# Patient Record
Sex: FEMALE | Race: WHITE | NOT HISPANIC OR LATINO | Employment: OTHER | URBAN - METROPOLITAN AREA
[De-identification: names, ages, dates, MRNs, and addresses within clinical notes are randomized per-mention and may not be internally consistent; named-entity substitution may affect disease eponyms.]

---

## 2017-01-01 ENCOUNTER — HOSPITAL ENCOUNTER (OUTPATIENT)
Dept: RADIOLOGY | Facility: MEDICAL CENTER | Age: 62
End: 2017-11-09
Attending: FAMILY MEDICINE
Payer: MEDICAID

## 2017-01-01 DIAGNOSIS — R10.11 ABDOMINAL PAIN, RIGHT UPPER QUADRANT: ICD-10-CM

## 2017-01-01 PROCEDURE — 76705 ECHO EXAM OF ABDOMEN: CPT

## 2017-01-24 ENCOUNTER — HOSPITAL ENCOUNTER (OUTPATIENT)
Dept: RADIOLOGY | Facility: MEDICAL CENTER | Age: 62
End: 2017-01-24
Attending: FAMILY MEDICINE
Payer: MEDICAID

## 2017-01-24 DIAGNOSIS — E27.8 ADRENAL MASS (HCC): ICD-10-CM

## 2017-01-24 PROCEDURE — 74150 CT ABDOMEN W/O CONTRAST: CPT

## 2018-01-01 ENCOUNTER — APPOINTMENT (OUTPATIENT)
Dept: RADIOLOGY | Facility: MEDICAL CENTER | Age: 63
DRG: 193 | End: 2018-01-01
Attending: INTERNAL MEDICINE
Payer: MEDICAID

## 2018-01-01 ENCOUNTER — HOSPITAL ENCOUNTER (INPATIENT)
Facility: MEDICAL CENTER | Age: 63
LOS: 19 days | DRG: 193 | End: 2018-07-12
Attending: EMERGENCY MEDICINE | Admitting: HOSPITALIST
Payer: MEDICAID

## 2018-01-01 ENCOUNTER — HOSPITAL ENCOUNTER (INPATIENT)
Facility: MEDICAL CENTER | Age: 63
LOS: 5 days | DRG: 871 | End: 2018-07-19
Attending: EMERGENCY MEDICINE | Admitting: HOSPITALIST
Payer: MEDICAID

## 2018-01-01 ENCOUNTER — HOME CARE VISIT (OUTPATIENT)
Dept: HOSPICE | Facility: HOSPICE | Age: 63
End: 2018-01-01
Payer: MEDICAID

## 2018-01-01 ENCOUNTER — APPOINTMENT (OUTPATIENT)
Dept: RADIOLOGY | Facility: MEDICAL CENTER | Age: 63
DRG: 193 | End: 2018-01-01
Attending: HOSPITALIST
Payer: MEDICAID

## 2018-01-01 ENCOUNTER — APPOINTMENT (OUTPATIENT)
Dept: RADIOLOGY | Facility: MEDICAL CENTER | Age: 63
DRG: 435 | End: 2018-01-01
Attending: INTERNAL MEDICINE
Payer: MEDICAID

## 2018-01-01 ENCOUNTER — PATIENT OUTREACH (OUTPATIENT)
Dept: OTHER | Facility: MEDICAL CENTER | Age: 63
End: 2018-01-01

## 2018-01-01 ENCOUNTER — HOSPITAL ENCOUNTER (INPATIENT)
Facility: MEDICAL CENTER | Age: 63
LOS: 10 days | DRG: 435 | End: 2018-05-14
Attending: FAMILY MEDICINE | Admitting: FAMILY MEDICINE
Payer: MEDICAID

## 2018-01-01 ENCOUNTER — APPOINTMENT (OUTPATIENT)
Dept: HOSPICE | Facility: HOSPICE | Age: 63
End: 2018-01-01
Payer: MEDICAID

## 2018-01-01 ENCOUNTER — APPOINTMENT (OUTPATIENT)
Dept: RADIOLOGY | Facility: MEDICAL CENTER | Age: 63
DRG: 437 | End: 2018-01-01
Attending: INTERNAL MEDICINE
Payer: MEDICAID

## 2018-01-01 ENCOUNTER — PATIENT OUTREACH (OUTPATIENT)
Dept: HEALTH INFORMATION MANAGEMENT | Facility: OTHER | Age: 63
End: 2018-01-01

## 2018-01-01 ENCOUNTER — APPOINTMENT (OUTPATIENT)
Dept: RADIOLOGY | Facility: MEDICAL CENTER | Age: 63
DRG: 871 | End: 2018-01-01
Attending: EMERGENCY MEDICINE
Payer: MEDICAID

## 2018-01-01 ENCOUNTER — HOSPITAL ENCOUNTER (OUTPATIENT)
Dept: RADIOLOGY | Facility: MEDICAL CENTER | Age: 63
End: 2018-05-21
Attending: INTERNAL MEDICINE
Payer: MEDICAID

## 2018-01-01 ENCOUNTER — APPOINTMENT (OUTPATIENT)
Dept: RADIOLOGY | Facility: MEDICAL CENTER | Age: 63
DRG: 193 | End: 2018-01-01
Attending: EMERGENCY MEDICINE
Payer: MEDICAID

## 2018-01-01 ENCOUNTER — HOSPITAL ENCOUNTER (EMERGENCY)
Facility: MEDICAL CENTER | Age: 63
End: 2018-05-04
Attending: EMERGENCY MEDICINE
Payer: MEDICAID

## 2018-01-01 ENCOUNTER — RESOLUTE PROFESSIONAL BILLING HOSPITAL PROF FEE (OUTPATIENT)
Dept: HOSPITALIST | Facility: MEDICAL CENTER | Age: 63
End: 2018-01-01
Payer: MEDICAID

## 2018-01-01 ENCOUNTER — APPOINTMENT (OUTPATIENT)
Dept: RADIOLOGY | Facility: MEDICAL CENTER | Age: 63
DRG: 871 | End: 2018-01-01
Payer: MEDICAID

## 2018-01-01 ENCOUNTER — APPOINTMENT (OUTPATIENT)
Dept: RADIOLOGY | Facility: MEDICAL CENTER | Age: 63
DRG: 193 | End: 2018-01-01
Attending: RADIOLOGY
Payer: MEDICAID

## 2018-01-01 ENCOUNTER — HOSPICE ADMISSION (OUTPATIENT)
Dept: HOSPICE | Facility: HOSPICE | Age: 63
End: 2018-01-01
Payer: MEDICAID

## 2018-01-01 ENCOUNTER — APPOINTMENT (OUTPATIENT)
Dept: RADIOLOGY | Facility: MEDICAL CENTER | Age: 63
End: 2018-01-01
Attending: EMERGENCY MEDICINE
Payer: MEDICAID

## 2018-01-01 ENCOUNTER — HOSPITAL ENCOUNTER (OUTPATIENT)
Dept: RADIOLOGY | Facility: MEDICAL CENTER | Age: 63
End: 2018-06-04
Attending: INTERNAL MEDICINE
Payer: MEDICAID

## 2018-01-01 ENCOUNTER — HOSPITAL ENCOUNTER (INPATIENT)
Facility: MEDICAL CENTER | Age: 63
LOS: 5 days | DRG: 437 | End: 2018-04-18
Attending: INTERNAL MEDICINE | Admitting: INTERNAL MEDICINE
Payer: MEDICAID

## 2018-01-01 ENCOUNTER — HOME HEALTH ADMISSION (OUTPATIENT)
Dept: HOME HEALTH SERVICES | Facility: HOME HEALTHCARE | Age: 63
End: 2018-01-01
Payer: MEDICAID

## 2018-01-01 ENCOUNTER — HOSPITAL ENCOUNTER (OUTPATIENT)
Facility: MEDICAL CENTER | Age: 63
DRG: 435 | End: 2018-01-01
Admitting: FAMILY MEDICINE
Payer: MEDICAID

## 2018-01-01 ENCOUNTER — HOSPITAL ENCOUNTER (OUTPATIENT)
Facility: MEDICAL CENTER | Age: 63
End: 2018-04-13

## 2018-01-01 VITALS
BODY MASS INDEX: 21.28 KG/M2 | RESPIRATION RATE: 18 BRPM | HEIGHT: 68 IN | OXYGEN SATURATION: 93 % | WEIGHT: 140.43 LBS | TEMPERATURE: 100 F | SYSTOLIC BLOOD PRESSURE: 123 MMHG | DIASTOLIC BLOOD PRESSURE: 71 MMHG | HEART RATE: 92 BPM

## 2018-01-01 VITALS
RESPIRATION RATE: 18 BRPM | HEIGHT: 68 IN | DIASTOLIC BLOOD PRESSURE: 72 MMHG | TEMPERATURE: 98.4 F | BODY MASS INDEX: 21.05 KG/M2 | WEIGHT: 138.89 LBS | HEART RATE: 104 BPM | SYSTOLIC BLOOD PRESSURE: 121 MMHG | OXYGEN SATURATION: 93 %

## 2018-01-01 VITALS
HEART RATE: 125 BPM | RESPIRATION RATE: 22 BRPM | SYSTOLIC BLOOD PRESSURE: 100 MMHG | TEMPERATURE: 99.6 F | DIASTOLIC BLOOD PRESSURE: 58 MMHG | OXYGEN SATURATION: 90 %

## 2018-01-01 VITALS
DIASTOLIC BLOOD PRESSURE: 66 MMHG | BODY MASS INDEX: 22.37 KG/M2 | RESPIRATION RATE: 16 BRPM | WEIGHT: 151.01 LBS | SYSTOLIC BLOOD PRESSURE: 102 MMHG | HEART RATE: 97 BPM | HEIGHT: 69 IN | OXYGEN SATURATION: 91 % | TEMPERATURE: 98 F

## 2018-01-01 VITALS
WEIGHT: 135.58 LBS | HEART RATE: 91 BPM | DIASTOLIC BLOOD PRESSURE: 77 MMHG | HEIGHT: 68 IN | SYSTOLIC BLOOD PRESSURE: 127 MMHG | TEMPERATURE: 99.7 F | RESPIRATION RATE: 18 BRPM | BODY MASS INDEX: 20.55 KG/M2 | OXYGEN SATURATION: 97 %

## 2018-01-01 VITALS
SYSTOLIC BLOOD PRESSURE: 88 MMHG | HEART RATE: 100 BPM | RESPIRATION RATE: 16 BRPM | DIASTOLIC BLOOD PRESSURE: 60 MMHG | OXYGEN SATURATION: 98 %

## 2018-01-01 VITALS
DIASTOLIC BLOOD PRESSURE: 58 MMHG | HEART RATE: 125 BPM | RESPIRATION RATE: 22 BRPM | SYSTOLIC BLOOD PRESSURE: 100 MMHG | TEMPERATURE: 99.6 F | OXYGEN SATURATION: 90 %

## 2018-01-01 VITALS
OXYGEN SATURATION: 93 % | DIASTOLIC BLOOD PRESSURE: 80 MMHG | HEART RATE: 104 BPM | SYSTOLIC BLOOD PRESSURE: 100 MMHG | RESPIRATION RATE: 10 BRPM

## 2018-01-01 VITALS
HEIGHT: 69 IN | DIASTOLIC BLOOD PRESSURE: 73 MMHG | WEIGHT: 151 LBS | HEART RATE: 104 BPM | OXYGEN SATURATION: 90 % | BODY MASS INDEX: 22.36 KG/M2 | SYSTOLIC BLOOD PRESSURE: 116 MMHG | TEMPERATURE: 97.2 F | RESPIRATION RATE: 17 BRPM

## 2018-01-01 VITALS — RESPIRATION RATE: 10 BRPM | OXYGEN SATURATION: 98 % | HEART RATE: 106 BPM

## 2018-01-01 DIAGNOSIS — R09.02 HYPOXIA: ICD-10-CM

## 2018-01-01 DIAGNOSIS — R10.9 INTRACTABLE ABDOMINAL PAIN: ICD-10-CM

## 2018-01-01 DIAGNOSIS — R10.11 RIGHT UPPER QUADRANT ABDOMINAL PAIN: ICD-10-CM

## 2018-01-01 DIAGNOSIS — C78.7 CHOLANGIOCARCINOMA METASTATIC TO LIVER (HCC): ICD-10-CM

## 2018-01-01 DIAGNOSIS — C78.7 LIVER METASTASES: ICD-10-CM

## 2018-01-01 DIAGNOSIS — C80.1 ADENOCARCINOMA (HCC): ICD-10-CM

## 2018-01-01 DIAGNOSIS — R11.2 INTRACTABLE VOMITING WITH NAUSEA, UNSPECIFIED VOMITING TYPE: ICD-10-CM

## 2018-01-01 DIAGNOSIS — C22.1 CHOLANGIOCARCINOMA METASTATIC TO LIVER (HCC): ICD-10-CM

## 2018-01-01 DIAGNOSIS — C22.1 CHOLANGIOCARCINOMA (HCC): ICD-10-CM

## 2018-01-01 DIAGNOSIS — M25.552 LEFT HIP PAIN: ICD-10-CM

## 2018-01-01 DIAGNOSIS — F32.A DEPRESSION, UNSPECIFIED DEPRESSION TYPE: ICD-10-CM

## 2018-01-01 DIAGNOSIS — R60.0 EDEMA OF BOTH LEGS: ICD-10-CM

## 2018-01-01 DIAGNOSIS — Z72.0 TOBACCO ABUSE: ICD-10-CM

## 2018-01-01 DIAGNOSIS — R41.82 ALTERED MENTAL STATUS, UNSPECIFIED ALTERED MENTAL STATUS TYPE: ICD-10-CM

## 2018-01-01 DIAGNOSIS — C78.7 LIVER METASTASES: Primary | ICD-10-CM

## 2018-01-01 DIAGNOSIS — D72.829 LEUKOCYTOSIS, UNSPECIFIED TYPE: ICD-10-CM

## 2018-01-01 DIAGNOSIS — C56.9 MALIGNANT NEOPLASM OF OVARY, UNSPECIFIED LATERALITY (HCC): ICD-10-CM

## 2018-01-01 DIAGNOSIS — R53.1 WEAKNESS: ICD-10-CM

## 2018-01-01 DIAGNOSIS — G62.9 NEUROPATHY: ICD-10-CM

## 2018-01-01 DIAGNOSIS — N39.0 URINARY TRACT INFECTION WITHOUT HEMATURIA, SITE UNSPECIFIED: ICD-10-CM

## 2018-01-01 DIAGNOSIS — R41.0 CONFUSION: ICD-10-CM

## 2018-01-01 DIAGNOSIS — M54.16 CHRONIC LUMBAR RADICULOPATHY: ICD-10-CM

## 2018-01-01 DIAGNOSIS — C78.7 LIVER METASTASIS: ICD-10-CM
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
## 2018-01-01 LAB
AFP-TM SERPL-MCNC: 4119 NG/ML (ref 0–9)
ALBUMIN SERPL BCP-MCNC: 2.6 G/DL (ref 3.2–4.9)
ALBUMIN SERPL BCP-MCNC: 2.6 G/DL (ref 3.2–4.9)
ALBUMIN SERPL BCP-MCNC: 2.7 G/DL (ref 3.2–4.9)
ALBUMIN SERPL BCP-MCNC: 2.8 G/DL (ref 3.2–4.9)
ALBUMIN SERPL BCP-MCNC: 2.8 G/DL (ref 3.2–4.9)
ALBUMIN SERPL BCP-MCNC: 3 G/DL (ref 3.2–4.9)
ALBUMIN SERPL BCP-MCNC: 3.1 G/DL (ref 3.2–4.9)
ALBUMIN SERPL BCP-MCNC: 3.1 G/DL (ref 3.2–4.9)
ALBUMIN SERPL BCP-MCNC: 3.2 G/DL (ref 3.2–4.9)
ALBUMIN SERPL BCP-MCNC: 3.2 G/DL (ref 3.2–4.9)
ALBUMIN SERPL BCP-MCNC: 3.3 G/DL (ref 3.2–4.9)
ALBUMIN SERPL BCP-MCNC: 3.4 G/DL (ref 3.2–4.9)
ALBUMIN SERPL BCP-MCNC: 3.5 G/DL (ref 3.2–4.9)
ALBUMIN SERPL BCP-MCNC: 3.6 G/DL (ref 3.2–4.9)
ALBUMIN SERPL BCP-MCNC: 3.7 G/DL (ref 3.2–4.9)
ALBUMIN SERPL BCP-MCNC: 3.7 G/DL (ref 3.2–4.9)
ALBUMIN/GLOB SERPL: 1 G/DL
ALBUMIN/GLOB SERPL: 1.1 G/DL
ALBUMIN/GLOB SERPL: 1.2 G/DL
ALBUMIN/GLOB SERPL: 1.3 G/DL
ALBUMIN/GLOB SERPL: 1.4 G/DL
ALP SERPL-CCNC: 121 U/L (ref 30–99)
ALP SERPL-CCNC: 132 U/L (ref 30–99)
ALP SERPL-CCNC: 153 U/L (ref 30–99)
ALP SERPL-CCNC: 172 U/L (ref 30–99)
ALP SERPL-CCNC: 181 U/L (ref 30–99)
ALP SERPL-CCNC: 183 U/L (ref 30–99)
ALP SERPL-CCNC: 184 U/L (ref 30–99)
ALP SERPL-CCNC: 198 U/L (ref 30–99)
ALP SERPL-CCNC: 202 U/L (ref 30–99)
ALP SERPL-CCNC: 216 U/L (ref 30–99)
ALP SERPL-CCNC: 223 U/L (ref 30–99)
ALP SERPL-CCNC: 226 U/L (ref 30–99)
ALP SERPL-CCNC: 263 U/L (ref 30–99)
ALP SERPL-CCNC: 272 U/L (ref 30–99)
ALP SERPL-CCNC: 289 U/L (ref 30–99)
ALP SERPL-CCNC: 293 U/L (ref 30–99)
ALP SERPL-CCNC: 410 U/L (ref 30–99)
ALT SERPL-CCNC: 17 U/L (ref 2–50)
ALT SERPL-CCNC: 18 U/L (ref 2–50)
ALT SERPL-CCNC: 18 U/L (ref 2–50)
ALT SERPL-CCNC: 20 U/L (ref 2–50)
ALT SERPL-CCNC: 22 U/L (ref 2–50)
ALT SERPL-CCNC: 24 U/L (ref 2–50)
ALT SERPL-CCNC: 28 U/L (ref 2–50)
ALT SERPL-CCNC: 30 U/L (ref 2–50)
ALT SERPL-CCNC: 34 U/L (ref 2–50)
ALT SERPL-CCNC: 37 U/L (ref 2–50)
ALT SERPL-CCNC: 50 U/L (ref 2–50)
ALT SERPL-CCNC: 57 U/L (ref 2–50)
ALT SERPL-CCNC: 64 U/L (ref 2–50)
AMMONIA PLAS-SCNC: 40 UMOL/L (ref 11–45)
AMYLASE FLD-CCNC: <10 U/L
ANION GAP SERPL CALC-SCNC: 10 MMOL/L (ref 0–11.9)
ANION GAP SERPL CALC-SCNC: 11 MMOL/L (ref 0–11.9)
ANION GAP SERPL CALC-SCNC: 12 MMOL/L (ref 0–11.9)
ANION GAP SERPL CALC-SCNC: 4 MMOL/L (ref 0–11.9)
ANION GAP SERPL CALC-SCNC: 5 MMOL/L (ref 0–11.9)
ANION GAP SERPL CALC-SCNC: 5 MMOL/L (ref 0–11.9)
ANION GAP SERPL CALC-SCNC: 6 MMOL/L (ref 0–11.9)
ANION GAP SERPL CALC-SCNC: 7 MMOL/L (ref 0–11.9)
ANION GAP SERPL CALC-SCNC: 8 MMOL/L (ref 0–11.9)
ANION GAP SERPL CALC-SCNC: 9 MMOL/L (ref 0–11.9)
ANISOCYTOSIS BLD QL SMEAR: ABNORMAL
APPEARANCE FLD: CLEAR
APPEARANCE UR: ABNORMAL
APPEARANCE UR: ABNORMAL
APPEARANCE UR: CLEAR
APTT PPP: 23.7 SEC (ref 24.7–36)
APTT PPP: 27.3 SEC (ref 24.7–36)
APTT PPP: 31.2 SEC (ref 24.7–36)
AST SERPL-CCNC: 102 U/L (ref 12–45)
AST SERPL-CCNC: 106 U/L (ref 12–45)
AST SERPL-CCNC: 193 U/L (ref 12–45)
AST SERPL-CCNC: 53 U/L (ref 12–45)
AST SERPL-CCNC: 54 U/L (ref 12–45)
AST SERPL-CCNC: 55 U/L (ref 12–45)
AST SERPL-CCNC: 65 U/L (ref 12–45)
AST SERPL-CCNC: 65 U/L (ref 12–45)
AST SERPL-CCNC: 67 U/L (ref 12–45)
AST SERPL-CCNC: 71 U/L (ref 12–45)
AST SERPL-CCNC: 75 U/L (ref 12–45)
AST SERPL-CCNC: 78 U/L (ref 12–45)
AST SERPL-CCNC: 83 U/L (ref 12–45)
AST SERPL-CCNC: 89 U/L (ref 12–45)
AST SERPL-CCNC: 92 U/L (ref 12–45)
AST SERPL-CCNC: 95 U/L (ref 12–45)
AST SERPL-CCNC: 98 U/L (ref 12–45)
BACTERIA #/AREA URNS HPF: ABNORMAL /HPF
BACTERIA #/AREA URNS HPF: NEGATIVE /HPF
BACTERIA BLD CULT: NORMAL
BACTERIA FLD AEROBE CULT: NORMAL
BACTERIA UR CULT: NORMAL
BASOPHILS # BLD AUTO: 0 % (ref 0–1.8)
BASOPHILS # BLD AUTO: 0 % (ref 0–1.8)
BASOPHILS # BLD AUTO: 0.2 % (ref 0–1.8)
BASOPHILS # BLD AUTO: 0.2 % (ref 0–1.8)
BASOPHILS # BLD AUTO: 0.3 % (ref 0–1.8)
BASOPHILS # BLD AUTO: 0.4 % (ref 0–1.8)
BASOPHILS # BLD AUTO: 0.5 % (ref 0–1.8)
BASOPHILS # BLD AUTO: 0.6 % (ref 0–1.8)
BASOPHILS # BLD AUTO: 0.7 % (ref 0–1.8)
BASOPHILS # BLD AUTO: 0.8 % (ref 0–1.8)
BASOPHILS # BLD AUTO: 0.9 % (ref 0–1.8)
BASOPHILS # BLD AUTO: 1.7 % (ref 0–1.8)
BASOPHILS # BLD AUTO: 3.4 % (ref 0–1.8)
BASOPHILS # BLD: 0 K/UL (ref 0–0.12)
BASOPHILS # BLD: 0 K/UL (ref 0–0.12)
BASOPHILS # BLD: 0.03 K/UL (ref 0–0.12)
BASOPHILS # BLD: 0.04 K/UL (ref 0–0.12)
BASOPHILS # BLD: 0.05 K/UL (ref 0–0.12)
BASOPHILS # BLD: 0.06 K/UL (ref 0–0.12)
BASOPHILS # BLD: 0.07 K/UL (ref 0–0.12)
BASOPHILS # BLD: 0.08 K/UL (ref 0–0.12)
BASOPHILS # BLD: 0.09 K/UL (ref 0–0.12)
BASOPHILS # BLD: 0.09 K/UL (ref 0–0.12)
BASOPHILS # BLD: 0.1 K/UL (ref 0–0.12)
BASOPHILS # BLD: 0.13 K/UL (ref 0–0.12)
BASOPHILS # BLD: 0.14 K/UL (ref 0–0.12)
BASOPHILS # BLD: 0.14 K/UL (ref 0–0.12)
BASOPHILS # BLD: 0.16 K/UL (ref 0–0.12)
BASOPHILS # BLD: 0.18 K/UL (ref 0–0.12)
BASOPHILS # BLD: 0.18 K/UL (ref 0–0.12)
BASOPHILS # BLD: 0.31 K/UL (ref 0–0.12)
BASOPHILS # BLD: 0.74 K/UL (ref 0–0.12)
BILIRUB SERPL-MCNC: 0.4 MG/DL (ref 0.1–1.5)
BILIRUB SERPL-MCNC: 0.5 MG/DL (ref 0.1–1.5)
BILIRUB SERPL-MCNC: 0.5 MG/DL (ref 0.1–1.5)
BILIRUB SERPL-MCNC: 0.7 MG/DL (ref 0.1–1.5)
BILIRUB SERPL-MCNC: 0.7 MG/DL (ref 0.1–1.5)
BILIRUB SERPL-MCNC: 0.8 MG/DL (ref 0.1–1.5)
BILIRUB SERPL-MCNC: 0.9 MG/DL (ref 0.1–1.5)
BILIRUB SERPL-MCNC: 1.3 MG/DL (ref 0.1–1.5)
BILIRUB SERPL-MCNC: 1.4 MG/DL (ref 0.1–1.5)
BILIRUB SERPL-MCNC: 1.6 MG/DL (ref 0.1–1.5)
BILIRUB SERPL-MCNC: 2.2 MG/DL (ref 0.1–1.5)
BILIRUB UR QL STRIP.AUTO: ABNORMAL
BILIRUB UR QL STRIP.AUTO: NEGATIVE
BILIRUB UR QL STRIP.AUTO: NEGATIVE
BLOOD CULTURE HOLD CXBCH: NORMAL
BNP SERPL-MCNC: 50 PG/ML (ref 0–100)
BODY FLD TYPE: NORMAL
BUN SERPL-MCNC: 10 MG/DL (ref 8–22)
BUN SERPL-MCNC: 12 MG/DL (ref 8–22)
BUN SERPL-MCNC: 13 MG/DL (ref 8–22)
BUN SERPL-MCNC: 14 MG/DL (ref 8–22)
BUN SERPL-MCNC: 15 MG/DL (ref 8–22)
BUN SERPL-MCNC: 4 MG/DL (ref 8–22)
BUN SERPL-MCNC: 5 MG/DL (ref 8–22)
BUN SERPL-MCNC: 6 MG/DL (ref 8–22)
BUN SERPL-MCNC: 7 MG/DL (ref 8–22)
BUN SERPL-MCNC: 8 MG/DL (ref 8–22)
BUN SERPL-MCNC: 9 MG/DL (ref 8–22)
CALCIUM SERPL-MCNC: 10.3 MG/DL (ref 8.4–10.2)
CALCIUM SERPL-MCNC: 8.1 MG/DL (ref 8.5–10.5)
CALCIUM SERPL-MCNC: 8.2 MG/DL (ref 8.5–10.5)
CALCIUM SERPL-MCNC: 8.3 MG/DL (ref 8.5–10.5)
CALCIUM SERPL-MCNC: 8.3 MG/DL (ref 8.5–10.5)
CALCIUM SERPL-MCNC: 8.4 MG/DL (ref 8.5–10.5)
CALCIUM SERPL-MCNC: 8.6 MG/DL (ref 8.5–10.5)
CALCIUM SERPL-MCNC: 8.7 MG/DL (ref 8.5–10.5)
CALCIUM SERPL-MCNC: 8.9 MG/DL (ref 8.5–10.5)
CALCIUM SERPL-MCNC: 9 MG/DL (ref 8.5–10.5)
CALCIUM SERPL-MCNC: 9 MG/DL (ref 8.5–10.5)
CALCIUM SERPL-MCNC: 9.2 MG/DL (ref 8.5–10.5)
CALCIUM SERPL-MCNC: 9.2 MG/DL (ref 8.5–10.5)
CALCIUM SERPL-MCNC: 9.3 MG/DL (ref 8.5–10.5)
CALCIUM SERPL-MCNC: 9.5 MG/DL (ref 8.5–10.5)
CALCIUM SERPL-MCNC: 9.5 MG/DL (ref 8.5–10.5)
CALCIUM SERPL-MCNC: 9.6 MG/DL (ref 8.5–10.5)
CALCIUM SERPL-MCNC: 9.6 MG/DL (ref 8.5–10.5)
CALCIUM SERPL-MCNC: 9.7 MG/DL (ref 8.5–10.5)
CALCIUM SERPL-MCNC: 9.8 MG/DL (ref 8.5–10.5)
CALCIUM SERPL-MCNC: 9.8 MG/DL (ref 8.5–10.5)
CALCIUM SERPL-MCNC: 9.9 MG/DL (ref 8.5–10.5)
CANCER AG125 SERPL-ACNC: 179.1 U/ML (ref 0–35)
CANCER AG19-9 SERPL-ACNC: 3.9 U/ML (ref 0–35)
CANCER AG27-29 SERPL-ACNC: 296.9 U/ML (ref 0–40)
CEA SERPL-MCNC: 3.6 NG/ML (ref 0–3)
CHLORIDE SERPL-SCNC: 100 MMOL/L (ref 96–112)
CHLORIDE SERPL-SCNC: 101 MMOL/L (ref 96–112)
CHLORIDE SERPL-SCNC: 102 MMOL/L (ref 96–112)
CHLORIDE SERPL-SCNC: 103 MMOL/L (ref 96–112)
CHLORIDE SERPL-SCNC: 104 MMOL/L (ref 96–112)
CHLORIDE SERPL-SCNC: 105 MMOL/L (ref 96–112)
CHLORIDE SERPL-SCNC: 105 MMOL/L (ref 96–112)
CHLORIDE SERPL-SCNC: 106 MMOL/L (ref 96–112)
CHLORIDE SERPL-SCNC: 106 MMOL/L (ref 96–112)
CHLORIDE SERPL-SCNC: 108 MMOL/L (ref 96–112)
CHLORIDE SERPL-SCNC: 96 MMOL/L (ref 96–112)
CHLORIDE SERPL-SCNC: 97 MMOL/L (ref 96–112)
CHLORIDE SERPL-SCNC: 98 MMOL/L (ref 96–112)
CHLORIDE SERPL-SCNC: 98 MMOL/L (ref 96–112)
CHLORIDE SERPL-SCNC: 99 MMOL/L (ref 96–112)
CO2 SERPL-SCNC: 22 MMOL/L (ref 20–33)
CO2 SERPL-SCNC: 22 MMOL/L (ref 20–33)
CO2 SERPL-SCNC: 23 MMOL/L (ref 20–33)
CO2 SERPL-SCNC: 24 MMOL/L (ref 20–33)
CO2 SERPL-SCNC: 25 MMOL/L (ref 20–33)
CO2 SERPL-SCNC: 26 MMOL/L (ref 20–33)
CO2 SERPL-SCNC: 27 MMOL/L (ref 20–33)
CO2 SERPL-SCNC: 28 MMOL/L (ref 20–33)
CO2 SERPL-SCNC: 29 MMOL/L (ref 20–33)
CO2 SERPL-SCNC: 30 MMOL/L (ref 20–33)
CO2 SERPL-SCNC: 30 MMOL/L (ref 20–33)
CO2 SERPL-SCNC: 31 MMOL/L (ref 20–33)
COLOR FLD: YELLOW
COLOR UR: ABNORMAL
COLOR UR: NORMAL
COLOR UR: YELLOW
COMMENT 1642: NORMAL
CREAT SERPL-MCNC: 0.36 MG/DL (ref 0.5–1.4)
CREAT SERPL-MCNC: 0.37 MG/DL (ref 0.5–1.4)
CREAT SERPL-MCNC: 0.39 MG/DL (ref 0.5–1.4)
CREAT SERPL-MCNC: 0.4 MG/DL (ref 0.5–1.4)
CREAT SERPL-MCNC: 0.42 MG/DL (ref 0.5–1.4)
CREAT SERPL-MCNC: 0.47 MG/DL (ref 0.5–1.4)
CREAT SERPL-MCNC: 0.47 MG/DL (ref 0.5–1.4)
CREAT SERPL-MCNC: 0.49 MG/DL (ref 0.5–1.4)
CREAT SERPL-MCNC: 0.49 MG/DL (ref 0.5–1.4)
CREAT SERPL-MCNC: 0.5 MG/DL (ref 0.5–1.4)
CREAT SERPL-MCNC: 0.51 MG/DL (ref 0.5–1.4)
CREAT SERPL-MCNC: 0.52 MG/DL (ref 0.5–1.4)
CREAT SERPL-MCNC: 0.53 MG/DL (ref 0.5–1.4)
CREAT SERPL-MCNC: 0.53 MG/DL (ref 0.5–1.4)
CREAT SERPL-MCNC: 0.54 MG/DL (ref 0.5–1.4)
CREAT SERPL-MCNC: 0.54 MG/DL (ref 0.5–1.4)
CREAT SERPL-MCNC: 0.56 MG/DL (ref 0.5–1.4)
CREAT SERPL-MCNC: 0.57 MG/DL (ref 0.5–1.4)
CREAT SERPL-MCNC: 0.59 MG/DL (ref 0.5–1.4)
CREAT SERPL-MCNC: 0.61 MG/DL (ref 0.5–1.4)
CREAT SERPL-MCNC: 0.61 MG/DL (ref 0.5–1.4)
CREAT SERPL-MCNC: 0.62 MG/DL (ref 0.5–1.4)
CREAT SERPL-MCNC: 0.64 MG/DL (ref 0.5–1.4)
CREAT SERPL-MCNC: 0.7 MG/DL (ref 0.5–1.4)
CREAT SERPL-MCNC: 0.75 MG/DL (ref 0.5–1.4)
CREAT SERPL-MCNC: 0.84 MG/DL (ref 0.5–1.4)
CREAT SERPL-MCNC: 1.07 MG/DL (ref 0.5–1.4)
CSF COMMENTS 1658: NORMAL
CYTOLOGY REG CYTOL: NORMAL
EKG IMPRESSION: NORMAL
EOSINOPHIL # BLD AUTO: 0 K/UL (ref 0–0.51)
EOSINOPHIL # BLD AUTO: 0.03 K/UL (ref 0–0.51)
EOSINOPHIL # BLD AUTO: 0.04 K/UL (ref 0–0.51)
EOSINOPHIL # BLD AUTO: 0.04 K/UL (ref 0–0.51)
EOSINOPHIL # BLD AUTO: 0.05 K/UL (ref 0–0.51)
EOSINOPHIL # BLD AUTO: 0.08 K/UL (ref 0–0.51)
EOSINOPHIL # BLD AUTO: 0.09 K/UL (ref 0–0.51)
EOSINOPHIL # BLD AUTO: 0.09 K/UL (ref 0–0.51)
EOSINOPHIL # BLD AUTO: 0.11 K/UL (ref 0–0.51)
EOSINOPHIL # BLD AUTO: 0.12 K/UL (ref 0–0.51)
EOSINOPHIL # BLD AUTO: 0.13 K/UL (ref 0–0.51)
EOSINOPHIL # BLD AUTO: 0.13 K/UL (ref 0–0.51)
EOSINOPHIL # BLD AUTO: 0.14 K/UL (ref 0–0.51)
EOSINOPHIL # BLD AUTO: 0.16 K/UL (ref 0–0.51)
EOSINOPHIL # BLD AUTO: 0.18 K/UL (ref 0–0.51)
EOSINOPHIL # BLD AUTO: 0.18 K/UL (ref 0–0.51)
EOSINOPHIL # BLD AUTO: 0.19 K/UL (ref 0–0.51)
EOSINOPHIL # BLD AUTO: 0.2 K/UL (ref 0–0.51)
EOSINOPHIL # BLD AUTO: 0.2 K/UL (ref 0–0.51)
EOSINOPHIL # BLD AUTO: 0.7 K/UL (ref 0–0.51)
EOSINOPHIL NFR BLD: 0 % (ref 0–6.9)
EOSINOPHIL NFR BLD: 0.2 % (ref 0–6.9)
EOSINOPHIL NFR BLD: 0.3 % (ref 0–6.9)
EOSINOPHIL NFR BLD: 0.5 % (ref 0–6.9)
EOSINOPHIL NFR BLD: 0.6 % (ref 0–6.9)
EOSINOPHIL NFR BLD: 0.7 % (ref 0–6.9)
EOSINOPHIL NFR BLD: 0.8 % (ref 0–6.9)
EOSINOPHIL NFR BLD: 0.9 % (ref 0–6.9)
EOSINOPHIL NFR BLD: 1 % (ref 0–6.9)
EOSINOPHIL NFR BLD: 1.1 % (ref 0–6.9)
EOSINOPHIL NFR BLD: 1.2 % (ref 0–6.9)
EOSINOPHIL NFR BLD: 3.5 % (ref 0–6.9)
EPI CELLS #/AREA URNS HPF: ABNORMAL /HPF
EPI CELLS #/AREA URNS HPF: ABNORMAL /HPF
ERYTHROCYTE [DISTWIDTH] IN BLOOD BY AUTOMATED COUNT: 46.7 FL (ref 35.9–50)
ERYTHROCYTE [DISTWIDTH] IN BLOOD BY AUTOMATED COUNT: 47 FL (ref 35.9–50)
ERYTHROCYTE [DISTWIDTH] IN BLOOD BY AUTOMATED COUNT: 47.6 FL (ref 35.9–50)
ERYTHROCYTE [DISTWIDTH] IN BLOOD BY AUTOMATED COUNT: 47.6 FL (ref 35.9–50)
ERYTHROCYTE [DISTWIDTH] IN BLOOD BY AUTOMATED COUNT: 47.8 FL (ref 35.9–50)
ERYTHROCYTE [DISTWIDTH] IN BLOOD BY AUTOMATED COUNT: 48.5 FL (ref 35.9–50)
ERYTHROCYTE [DISTWIDTH] IN BLOOD BY AUTOMATED COUNT: 48.7 FL (ref 35.9–50)
ERYTHROCYTE [DISTWIDTH] IN BLOOD BY AUTOMATED COUNT: 49.3 FL (ref 35.9–50)
ERYTHROCYTE [DISTWIDTH] IN BLOOD BY AUTOMATED COUNT: 49.3 FL (ref 35.9–50)
ERYTHROCYTE [DISTWIDTH] IN BLOOD BY AUTOMATED COUNT: 49.9 FL (ref 35.9–50)
ERYTHROCYTE [DISTWIDTH] IN BLOOD BY AUTOMATED COUNT: 50 FL (ref 35.9–50)
ERYTHROCYTE [DISTWIDTH] IN BLOOD BY AUTOMATED COUNT: 50.1 FL (ref 35.9–50)
ERYTHROCYTE [DISTWIDTH] IN BLOOD BY AUTOMATED COUNT: 61.1 FL (ref 35.9–50)
ERYTHROCYTE [DISTWIDTH] IN BLOOD BY AUTOMATED COUNT: 63.9 FL (ref 35.9–50)
ERYTHROCYTE [DISTWIDTH] IN BLOOD BY AUTOMATED COUNT: 64.1 FL (ref 35.9–50)
ERYTHROCYTE [DISTWIDTH] IN BLOOD BY AUTOMATED COUNT: 64.1 FL (ref 35.9–50)
ERYTHROCYTE [DISTWIDTH] IN BLOOD BY AUTOMATED COUNT: 65.3 FL (ref 35.9–50)
ERYTHROCYTE [DISTWIDTH] IN BLOOD BY AUTOMATED COUNT: 66.2 FL (ref 35.9–50)
ERYTHROCYTE [DISTWIDTH] IN BLOOD BY AUTOMATED COUNT: 66.6 FL (ref 35.9–50)
ERYTHROCYTE [DISTWIDTH] IN BLOOD BY AUTOMATED COUNT: 66.6 FL (ref 35.9–50)
ERYTHROCYTE [DISTWIDTH] IN BLOOD BY AUTOMATED COUNT: 67.4 FL (ref 35.9–50)
ERYTHROCYTE [DISTWIDTH] IN BLOOD BY AUTOMATED COUNT: 67.6 FL (ref 35.9–50)
ERYTHROCYTE [DISTWIDTH] IN BLOOD BY AUTOMATED COUNT: 68.8 FL (ref 35.9–50)
ERYTHROCYTE [DISTWIDTH] IN BLOOD BY AUTOMATED COUNT: 71.2 FL (ref 35.9–50)
ERYTHROCYTE [DISTWIDTH] IN BLOOD BY AUTOMATED COUNT: 71.7 FL (ref 35.9–50)
ERYTHROCYTE [DISTWIDTH] IN BLOOD BY AUTOMATED COUNT: 72.2 FL (ref 35.9–50)
ERYTHROCYTE [DISTWIDTH] IN BLOOD BY AUTOMATED COUNT: 73.2 FL (ref 35.9–50)
ERYTHROCYTE [DISTWIDTH] IN BLOOD BY AUTOMATED COUNT: 73.4 FL (ref 35.9–50)
ERYTHROCYTE [DISTWIDTH] IN BLOOD BY AUTOMATED COUNT: 73.4 FL (ref 35.9–50)
ERYTHROCYTE [DISTWIDTH] IN BLOOD BY AUTOMATED COUNT: 76.6 FL (ref 35.9–50)
ERYTHROCYTE [DISTWIDTH] IN BLOOD BY AUTOMATED COUNT: 79.1 FL (ref 35.9–50)
ERYTHROCYTE [DISTWIDTH] IN BLOOD BY AUTOMATED COUNT: 80.5 FL (ref 35.9–50)
ERYTHROCYTE [DISTWIDTH] IN BLOOD BY AUTOMATED COUNT: 84.1 FL (ref 35.9–50)
ERYTHROCYTE [DISTWIDTH] IN BLOOD BY AUTOMATED COUNT: 89 FL (ref 35.9–50)
EST. AVERAGE GLUCOSE BLD GHB EST-MCNC: 108 MG/DL
EST. AVERAGE GLUCOSE BLD GHB EST-MCNC: 108 MG/DL
FOLATE SERPL-MCNC: 6 NG/ML
GLOBULIN SER CALC-MCNC: 2.3 G/DL (ref 1.9–3.5)
GLOBULIN SER CALC-MCNC: 2.4 G/DL (ref 1.9–3.5)
GLOBULIN SER CALC-MCNC: 2.4 G/DL (ref 1.9–3.5)
GLOBULIN SER CALC-MCNC: 2.5 G/DL (ref 1.9–3.5)
GLOBULIN SER CALC-MCNC: 2.5 G/DL (ref 1.9–3.5)
GLOBULIN SER CALC-MCNC: 2.6 G/DL (ref 1.9–3.5)
GLOBULIN SER CALC-MCNC: 2.7 G/DL (ref 1.9–3.5)
GLOBULIN SER CALC-MCNC: 2.7 G/DL (ref 1.9–3.5)
GLOBULIN SER CALC-MCNC: 2.8 G/DL (ref 1.9–3.5)
GLOBULIN SER CALC-MCNC: 3.1 G/DL (ref 1.9–3.5)
GLOBULIN SER CALC-MCNC: 3.3 G/DL (ref 1.9–3.5)
GLOBULIN SER CALC-MCNC: 3.3 G/DL (ref 1.9–3.5)
GLUCOSE FLD-MCNC: 109 MG/DL
GLUCOSE SERPL-MCNC: 100 MG/DL (ref 65–99)
GLUCOSE SERPL-MCNC: 100 MG/DL (ref 65–99)
GLUCOSE SERPL-MCNC: 101 MG/DL (ref 65–99)
GLUCOSE SERPL-MCNC: 102 MG/DL (ref 65–99)
GLUCOSE SERPL-MCNC: 102 MG/DL (ref 65–99)
GLUCOSE SERPL-MCNC: 103 MG/DL (ref 65–99)
GLUCOSE SERPL-MCNC: 104 MG/DL (ref 65–99)
GLUCOSE SERPL-MCNC: 105 MG/DL (ref 65–99)
GLUCOSE SERPL-MCNC: 107 MG/DL (ref 65–99)
GLUCOSE SERPL-MCNC: 112 MG/DL (ref 65–99)
GLUCOSE SERPL-MCNC: 118 MG/DL (ref 65–99)
GLUCOSE SERPL-MCNC: 120 MG/DL (ref 65–99)
GLUCOSE SERPL-MCNC: 122 MG/DL (ref 65–99)
GLUCOSE SERPL-MCNC: 122 MG/DL (ref 65–99)
GLUCOSE SERPL-MCNC: 129 MG/DL (ref 65–99)
GLUCOSE SERPL-MCNC: 133 MG/DL (ref 65–99)
GLUCOSE SERPL-MCNC: 139 MG/DL (ref 65–99)
GLUCOSE SERPL-MCNC: 86 MG/DL (ref 65–99)
GLUCOSE SERPL-MCNC: 89 MG/DL (ref 65–99)
GLUCOSE SERPL-MCNC: 90 MG/DL (ref 65–99)
GLUCOSE SERPL-MCNC: 90 MG/DL (ref 65–99)
GLUCOSE SERPL-MCNC: 96 MG/DL (ref 65–99)
GLUCOSE SERPL-MCNC: 96 MG/DL (ref 65–99)
GLUCOSE SERPL-MCNC: 97 MG/DL (ref 65–99)
GLUCOSE SERPL-MCNC: 98 MG/DL (ref 65–99)
GLUCOSE SERPL-MCNC: 98 MG/DL (ref 65–99)
GLUCOSE SERPL-MCNC: 99 MG/DL (ref 65–99)
GLUCOSE SERPL-MCNC: 99 MG/DL (ref 65–99)
GLUCOSE UR STRIP.AUTO-MCNC: NEGATIVE MG/DL
GRAM STN SPEC: NORMAL
GRAM STN SPEC: NORMAL
HBA1C MFR BLD: 5.4 % (ref 0–5.6)
HBA1C MFR BLD: 5.4 % (ref 0–5.6)
HCT VFR BLD AUTO: 26.4 % (ref 37–47)
HCT VFR BLD AUTO: 26.8 % (ref 37–47)
HCT VFR BLD AUTO: 27 % (ref 37–47)
HCT VFR BLD AUTO: 27 % (ref 37–47)
HCT VFR BLD AUTO: 27.2 % (ref 37–47)
HCT VFR BLD AUTO: 27.2 % (ref 37–47)
HCT VFR BLD AUTO: 28 % (ref 37–47)
HCT VFR BLD AUTO: 29.1 % (ref 37–47)
HCT VFR BLD AUTO: 29.1 % (ref 37–47)
HCT VFR BLD AUTO: 29.8 % (ref 37–47)
HCT VFR BLD AUTO: 30 % (ref 37–47)
HCT VFR BLD AUTO: 30.1 % (ref 37–47)
HCT VFR BLD AUTO: 30.1 % (ref 37–47)
HCT VFR BLD AUTO: 30.2 % (ref 37–47)
HCT VFR BLD AUTO: 30.6 % (ref 37–47)
HCT VFR BLD AUTO: 30.7 % (ref 37–47)
HCT VFR BLD AUTO: 30.8 % (ref 37–47)
HCT VFR BLD AUTO: 31.1 % (ref 37–47)
HCT VFR BLD AUTO: 31.9 % (ref 37–47)
HCT VFR BLD AUTO: 33.3 % (ref 37–47)
HCT VFR BLD AUTO: 34.3 % (ref 37–47)
HCT VFR BLD AUTO: 34.4 % (ref 37–47)
HCT VFR BLD AUTO: 34.4 % (ref 37–47)
HCT VFR BLD AUTO: 35 % (ref 37–47)
HCT VFR BLD AUTO: 36.1 % (ref 37–47)
HCT VFR BLD AUTO: 36.4 % (ref 37–47)
HCT VFR BLD AUTO: 36.6 % (ref 37–47)
HCT VFR BLD AUTO: 36.7 % (ref 37–47)
HCT VFR BLD AUTO: 36.8 % (ref 37–47)
HCT VFR BLD AUTO: 37 % (ref 37–47)
HCT VFR BLD AUTO: 37.2 % (ref 37–47)
HCT VFR BLD AUTO: 37.6 % (ref 37–47)
HCT VFR BLD AUTO: 38.4 % (ref 37–47)
HCT VFR BLD AUTO: 38.7 % (ref 37–47)
HCT VFR BLD AUTO: 38.8 % (ref 37–47)
HCT VFR BLD AUTO: 42 % (ref 37–47)
HGB BLD-MCNC: 10 G/DL (ref 12–16)
HGB BLD-MCNC: 10.8 G/DL (ref 12–16)
HGB BLD-MCNC: 11.1 G/DL (ref 12–16)
HGB BLD-MCNC: 11.3 G/DL (ref 12–16)
HGB BLD-MCNC: 11.3 G/DL (ref 12–16)
HGB BLD-MCNC: 11.4 G/DL (ref 12–16)
HGB BLD-MCNC: 11.5 G/DL (ref 12–16)
HGB BLD-MCNC: 11.6 G/DL (ref 12–16)
HGB BLD-MCNC: 11.8 G/DL (ref 12–16)
HGB BLD-MCNC: 11.9 G/DL (ref 12–16)
HGB BLD-MCNC: 11.9 G/DL (ref 12–16)
HGB BLD-MCNC: 12 G/DL (ref 12–16)
HGB BLD-MCNC: 12.4 G/DL (ref 12–16)
HGB BLD-MCNC: 12.4 G/DL (ref 12–16)
HGB BLD-MCNC: 12.5 G/DL (ref 12–16)
HGB BLD-MCNC: 12.8 G/DL (ref 12–16)
HGB BLD-MCNC: 12.9 G/DL (ref 12–16)
HGB BLD-MCNC: 14 G/DL (ref 12–16)
HGB BLD-MCNC: 8.6 G/DL (ref 12–16)
HGB BLD-MCNC: 8.7 G/DL (ref 12–16)
HGB BLD-MCNC: 8.8 G/DL (ref 12–16)
HGB BLD-MCNC: 8.9 G/DL (ref 12–16)
HGB BLD-MCNC: 9.1 G/DL (ref 12–16)
HGB BLD-MCNC: 9.2 G/DL (ref 12–16)
HGB BLD-MCNC: 9.3 G/DL (ref 12–16)
HGB BLD-MCNC: 9.4 G/DL (ref 12–16)
HGB BLD-MCNC: 9.5 G/DL (ref 12–16)
HGB BLD-MCNC: 9.5 G/DL (ref 12–16)
HGB BLD-MCNC: 9.6 G/DL (ref 12–16)
HGB BLD-MCNC: 9.6 G/DL (ref 12–16)
HGB BLD-MCNC: 9.9 G/DL (ref 12–16)
HISTIOCYTES NFR FLD: 26 %
HYALINE CASTS #/AREA URNS LPF: ABNORMAL /LPF
HYALINE CASTS #/AREA URNS LPF: ABNORMAL /LPF
HYPOCHROMIA BLD QL SMEAR: ABNORMAL
HYPOCHROMIA BLD QL SMEAR: ABNORMAL
IMM GRANULOCYTES # BLD AUTO: 0.04 K/UL (ref 0–0.11)
IMM GRANULOCYTES # BLD AUTO: 0.05 K/UL (ref 0–0.11)
IMM GRANULOCYTES # BLD AUTO: 0.05 K/UL (ref 0–0.11)
IMM GRANULOCYTES # BLD AUTO: 0.07 K/UL (ref 0–0.11)
IMM GRANULOCYTES # BLD AUTO: 0.07 K/UL (ref 0–0.11)
IMM GRANULOCYTES # BLD AUTO: 0.08 K/UL (ref 0–0.11)
IMM GRANULOCYTES # BLD AUTO: 0.09 K/UL (ref 0–0.11)
IMM GRANULOCYTES # BLD AUTO: 0.1 K/UL (ref 0–0.11)
IMM GRANULOCYTES # BLD AUTO: 0.12 K/UL (ref 0–0.11)
IMM GRANULOCYTES # BLD AUTO: 0.17 K/UL (ref 0–0.11)
IMM GRANULOCYTES # BLD AUTO: 0.26 K/UL (ref 0–0.11)
IMM GRANULOCYTES # BLD AUTO: 0.27 K/UL (ref 0–0.11)
IMM GRANULOCYTES # BLD AUTO: 0.29 K/UL (ref 0–0.11)
IMM GRANULOCYTES # BLD AUTO: 0.29 K/UL (ref 0–0.11)
IMM GRANULOCYTES # BLD AUTO: 0.32 K/UL (ref 0–0.11)
IMM GRANULOCYTES # BLD AUTO: 0.43 K/UL (ref 0–0.11)
IMM GRANULOCYTES # BLD AUTO: 0.49 K/UL (ref 0–0.11)
IMM GRANULOCYTES # BLD AUTO: 0.54 K/UL (ref 0–0.11)
IMM GRANULOCYTES # BLD AUTO: 0.56 K/UL (ref 0–0.11)
IMM GRANULOCYTES # BLD AUTO: 0.59 K/UL (ref 0–0.11)
IMM GRANULOCYTES # BLD AUTO: 0.68 K/UL (ref 0–0.11)
IMM GRANULOCYTES # BLD AUTO: 0.83 K/UL (ref 0–0.11)
IMM GRANULOCYTES NFR BLD AUTO: 0.4 % (ref 0–0.9)
IMM GRANULOCYTES NFR BLD AUTO: 0.5 % (ref 0–0.9)
IMM GRANULOCYTES NFR BLD AUTO: 0.5 % (ref 0–0.9)
IMM GRANULOCYTES NFR BLD AUTO: 0.7 % (ref 0–0.9)
IMM GRANULOCYTES NFR BLD AUTO: 0.7 % (ref 0–0.9)
IMM GRANULOCYTES NFR BLD AUTO: 0.9 % (ref 0–0.9)
IMM GRANULOCYTES NFR BLD AUTO: 0.9 % (ref 0–0.9)
IMM GRANULOCYTES NFR BLD AUTO: 1.1 % (ref 0–0.9)
IMM GRANULOCYTES NFR BLD AUTO: 1.3 % (ref 0–0.9)
IMM GRANULOCYTES NFR BLD AUTO: 1.3 % (ref 0–0.9)
IMM GRANULOCYTES NFR BLD AUTO: 1.4 % (ref 0–0.9)
IMM GRANULOCYTES NFR BLD AUTO: 1.4 % (ref 0–0.9)
IMM GRANULOCYTES NFR BLD AUTO: 1.6 % (ref 0–0.9)
IMM GRANULOCYTES NFR BLD AUTO: 1.9 % (ref 0–0.9)
IMM GRANULOCYTES NFR BLD AUTO: 2.3 % (ref 0–0.9)
IMM GRANULOCYTES NFR BLD AUTO: 2.4 % (ref 0–0.9)
IMM GRANULOCYTES NFR BLD AUTO: 2.5 % (ref 0–0.9)
IMM GRANULOCYTES NFR BLD AUTO: 3.6 % (ref 0–0.9)
IMM GRANULOCYTES NFR BLD AUTO: 4.2 % (ref 0–0.9)
IMM GRANULOCYTES NFR BLD AUTO: 4.6 % (ref 0–0.9)
IMM GRANULOCYTES NFR BLD AUTO: 4.7 % (ref 0–0.9)
INR PPP: 1.11 (ref 0.87–1.13)
INR PPP: 1.16 (ref 0.87–1.13)
INR PPP: 1.2 (ref 0.87–1.13)
INR PPP: 1.33 (ref 0.87–1.13)
INR PPP: 1.36 (ref 0.87–1.13)
KETONES UR STRIP.AUTO-MCNC: ABNORMAL MG/DL
KETONES UR STRIP.AUTO-MCNC: ABNORMAL MG/DL
KETONES UR STRIP.AUTO-MCNC: NEGATIVE MG/DL
LACTATE BLD-SCNC: 1.48 MMOL/L (ref 0.5–2)
LACTATE BLD-SCNC: 1.9 MMOL/L (ref 0.5–2)
LACTATE BLD-SCNC: 2.2 MMOL/L (ref 0.5–2)
LACTATE BLD-SCNC: 3.2 MMOL/L (ref 0.5–2)
LDH FLD L TO P-CCNC: 68 U/L
LDH SERPL L TO P-CCNC: 552 U/L (ref 107–266)
LEUKOCYTE ESTERASE UR QL STRIP.AUTO: ABNORMAL
LEUKOCYTE ESTERASE UR QL STRIP.AUTO: NEGATIVE
LEUKOCYTE ESTERASE UR QL STRIP.AUTO: NEGATIVE
LIPASE SERPL-CCNC: 11 U/L (ref 7–58)
LYMPHOCYTES # BLD AUTO: 0.7 K/UL (ref 1–4.8)
LYMPHOCYTES # BLD AUTO: 0.74 K/UL (ref 1–4.8)
LYMPHOCYTES # BLD AUTO: 0.95 K/UL (ref 1–4.8)
LYMPHOCYTES # BLD AUTO: 0.96 K/UL (ref 1–4.8)
LYMPHOCYTES # BLD AUTO: 0.98 K/UL (ref 1–4.8)
LYMPHOCYTES # BLD AUTO: 1.01 K/UL (ref 1–4.8)
LYMPHOCYTES # BLD AUTO: 1.04 K/UL (ref 1–4.8)
LYMPHOCYTES # BLD AUTO: 1.09 K/UL (ref 1–4.8)
LYMPHOCYTES # BLD AUTO: 1.09 K/UL (ref 1–4.8)
LYMPHOCYTES # BLD AUTO: 1.1 K/UL (ref 1–4.8)
LYMPHOCYTES # BLD AUTO: 1.11 K/UL (ref 1–4.8)
LYMPHOCYTES # BLD AUTO: 1.14 K/UL (ref 1–4.8)
LYMPHOCYTES # BLD AUTO: 1.21 K/UL (ref 1–4.8)
LYMPHOCYTES # BLD AUTO: 1.24 K/UL (ref 1–4.8)
LYMPHOCYTES # BLD AUTO: 1.28 K/UL (ref 1–4.8)
LYMPHOCYTES # BLD AUTO: 1.29 K/UL (ref 1–4.8)
LYMPHOCYTES # BLD AUTO: 1.35 K/UL (ref 1–4.8)
LYMPHOCYTES # BLD AUTO: 1.38 K/UL (ref 1–4.8)
LYMPHOCYTES # BLD AUTO: 1.39 K/UL (ref 1–4.8)
LYMPHOCYTES # BLD AUTO: 1.4 K/UL (ref 1–4.8)
LYMPHOCYTES # BLD AUTO: 1.41 K/UL (ref 1–4.8)
LYMPHOCYTES # BLD AUTO: 1.46 K/UL (ref 1–4.8)
LYMPHOCYTES # BLD AUTO: 1.47 K/UL (ref 1–4.8)
LYMPHOCYTES # BLD AUTO: 1.48 K/UL (ref 1–4.8)
LYMPHOCYTES # BLD AUTO: 1.49 K/UL (ref 1–4.8)
LYMPHOCYTES # BLD AUTO: 1.51 K/UL (ref 1–4.8)
LYMPHOCYTES # BLD AUTO: 1.52 K/UL (ref 1–4.8)
LYMPHOCYTES # BLD AUTO: 1.67 K/UL (ref 1–4.8)
LYMPHOCYTES # BLD AUTO: 1.73 K/UL (ref 1–4.8)
LYMPHOCYTES # BLD AUTO: 1.73 K/UL (ref 1–4.8)
LYMPHOCYTES # BLD AUTO: 1.9 K/UL (ref 1–4.8)
LYMPHOCYTES # BLD AUTO: 1.9 K/UL (ref 1–4.8)
LYMPHOCYTES NFR BLD: 10 % (ref 22–41)
LYMPHOCYTES NFR BLD: 10.4 % (ref 22–41)
LYMPHOCYTES NFR BLD: 10.6 % (ref 22–41)
LYMPHOCYTES NFR BLD: 10.7 % (ref 22–41)
LYMPHOCYTES NFR BLD: 11.2 % (ref 22–41)
LYMPHOCYTES NFR BLD: 11.2 % (ref 22–41)
LYMPHOCYTES NFR BLD: 12.1 % (ref 22–41)
LYMPHOCYTES NFR BLD: 12.7 % (ref 22–41)
LYMPHOCYTES NFR BLD: 13.6 % (ref 22–41)
LYMPHOCYTES NFR BLD: 13.8 % (ref 22–41)
LYMPHOCYTES NFR BLD: 3.5 % (ref 22–41)
LYMPHOCYTES NFR BLD: 4.3 % (ref 22–41)
LYMPHOCYTES NFR BLD: 5.3 % (ref 22–41)
LYMPHOCYTES NFR BLD: 5.3 % (ref 22–41)
LYMPHOCYTES NFR BLD: 5.9 % (ref 22–41)
LYMPHOCYTES NFR BLD: 6.1 % (ref 22–41)
LYMPHOCYTES NFR BLD: 6.6 % (ref 22–41)
LYMPHOCYTES NFR BLD: 6.8 % (ref 22–41)
LYMPHOCYTES NFR BLD: 7 % (ref 22–41)
LYMPHOCYTES NFR BLD: 7.4 % (ref 22–41)
LYMPHOCYTES NFR BLD: 7.5 % (ref 22–41)
LYMPHOCYTES NFR BLD: 7.6 % (ref 22–41)
LYMPHOCYTES NFR BLD: 7.8 % (ref 22–41)
LYMPHOCYTES NFR BLD: 7.8 % (ref 22–41)
LYMPHOCYTES NFR BLD: 8.1 % (ref 22–41)
LYMPHOCYTES NFR BLD: 8.2 % (ref 22–41)
LYMPHOCYTES NFR BLD: 8.3 % (ref 22–41)
LYMPHOCYTES NFR BLD: 8.3 % (ref 22–41)
LYMPHOCYTES NFR BLD: 8.5 % (ref 22–41)
LYMPHOCYTES NFR BLD: 9.5 % (ref 22–41)
LYMPHOCYTES NFR FLD: 9 %
MACROCYTES BLD QL SMEAR: ABNORMAL
MAGNESIUM SERPL-MCNC: 1.3 MG/DL (ref 1.5–2.5)
MAGNESIUM SERPL-MCNC: 1.6 MG/DL (ref 1.5–2.5)
MAGNESIUM SERPL-MCNC: 1.9 MG/DL (ref 1.5–2.5)
MAGNESIUM SERPL-MCNC: 2 MG/DL (ref 1.5–2.5)
MANUAL DIFF BLD: NORMAL
MCH RBC QN AUTO: 31.4 PG (ref 27–33)
MCH RBC QN AUTO: 31.9 PG (ref 27–33)
MCH RBC QN AUTO: 32.1 PG (ref 27–33)
MCH RBC QN AUTO: 32.2 PG (ref 27–33)
MCH RBC QN AUTO: 32.3 PG (ref 27–33)
MCH RBC QN AUTO: 32.3 PG (ref 27–33)
MCH RBC QN AUTO: 32.4 PG (ref 27–33)
MCH RBC QN AUTO: 32.5 PG (ref 27–33)
MCH RBC QN AUTO: 32.5 PG (ref 27–33)
MCH RBC QN AUTO: 32.6 PG (ref 27–33)
MCH RBC QN AUTO: 32.6 PG (ref 27–33)
MCH RBC QN AUTO: 32.7 PG (ref 27–33)
MCH RBC QN AUTO: 32.7 PG (ref 27–33)
MCH RBC QN AUTO: 32.9 PG (ref 27–33)
MCH RBC QN AUTO: 33 PG (ref 27–33)
MCH RBC QN AUTO: 33.1 PG (ref 27–33)
MCH RBC QN AUTO: 33.1 PG (ref 27–33)
MCH RBC QN AUTO: 33.2 PG (ref 27–33)
MCH RBC QN AUTO: 33.3 PG (ref 27–33)
MCH RBC QN AUTO: 33.4 PG (ref 27–33)
MCH RBC QN AUTO: 33.4 PG (ref 27–33)
MCH RBC QN AUTO: 33.5 PG (ref 27–33)
MCH RBC QN AUTO: 33.6 PG (ref 27–33)
MCH RBC QN AUTO: 33.7 PG (ref 27–33)
MCH RBC QN AUTO: 33.7 PG (ref 27–33)
MCH RBC QN AUTO: 33.9 PG (ref 27–33)
MCH RBC QN AUTO: 34 PG (ref 27–33)
MCH RBC QN AUTO: 34 PG (ref 27–33)
MCH RBC QN AUTO: 34.2 PG (ref 27–33)
MCHC RBC AUTO-ENTMCNC: 30.4 G/DL (ref 33.6–35)
MCHC RBC AUTO-ENTMCNC: 30.9 G/DL (ref 33.6–35)
MCHC RBC AUTO-ENTMCNC: 31 G/DL (ref 33.6–35)
MCHC RBC AUTO-ENTMCNC: 31.1 G/DL (ref 33.6–35)
MCHC RBC AUTO-ENTMCNC: 31.2 G/DL (ref 33.6–35)
MCHC RBC AUTO-ENTMCNC: 31.6 G/DL (ref 33.6–35)
MCHC RBC AUTO-ENTMCNC: 31.6 G/DL (ref 33.6–35)
MCHC RBC AUTO-ENTMCNC: 31.7 G/DL (ref 33.6–35)
MCHC RBC AUTO-ENTMCNC: 31.7 G/DL (ref 33.6–35)
MCHC RBC AUTO-ENTMCNC: 32 G/DL (ref 33.6–35)
MCHC RBC AUTO-ENTMCNC: 32 G/DL (ref 33.6–35)
MCHC RBC AUTO-ENTMCNC: 32.1 G/DL (ref 33.6–35)
MCHC RBC AUTO-ENTMCNC: 32.2 G/DL (ref 33.6–35)
MCHC RBC AUTO-ENTMCNC: 32.3 G/DL (ref 33.6–35)
MCHC RBC AUTO-ENTMCNC: 32.4 G/DL (ref 33.6–35)
MCHC RBC AUTO-ENTMCNC: 32.4 G/DL (ref 33.6–35)
MCHC RBC AUTO-ENTMCNC: 32.5 G/DL (ref 33.6–35)
MCHC RBC AUTO-ENTMCNC: 32.6 G/DL (ref 33.6–35)
MCHC RBC AUTO-ENTMCNC: 32.7 G/DL (ref 33.6–35)
MCHC RBC AUTO-ENTMCNC: 32.8 G/DL (ref 33.6–35)
MCHC RBC AUTO-ENTMCNC: 33 G/DL (ref 33.6–35)
MCHC RBC AUTO-ENTMCNC: 33.2 G/DL (ref 33.6–35)
MCHC RBC AUTO-ENTMCNC: 33.3 G/DL (ref 33.6–35)
MCHC RBC AUTO-ENTMCNC: 33.8 G/DL (ref 33.6–35)
MCV RBC AUTO: 100 FL (ref 81.4–97.8)
MCV RBC AUTO: 100.2 FL (ref 81.4–97.8)
MCV RBC AUTO: 100.4 FL (ref 81.4–97.8)
MCV RBC AUTO: 100.4 FL (ref 81.4–97.8)
MCV RBC AUTO: 100.6 FL (ref 81.4–97.8)
MCV RBC AUTO: 100.6 FL (ref 81.4–97.8)
MCV RBC AUTO: 101 FL (ref 81.4–97.8)
MCV RBC AUTO: 101.1 FL (ref 81.4–97.8)
MCV RBC AUTO: 101.1 FL (ref 81.4–97.8)
MCV RBC AUTO: 101.2 FL (ref 81.4–97.8)
MCV RBC AUTO: 101.3 FL (ref 81.4–97.8)
MCV RBC AUTO: 101.5 FL (ref 81.4–97.8)
MCV RBC AUTO: 101.6 FL (ref 81.4–97.8)
MCV RBC AUTO: 101.7 FL (ref 81.4–97.8)
MCV RBC AUTO: 101.9 FL (ref 81.4–97.8)
MCV RBC AUTO: 102 FL (ref 81.4–97.8)
MCV RBC AUTO: 102.1 FL (ref 81.4–97.8)
MCV RBC AUTO: 102.2 FL (ref 81.4–97.8)
MCV RBC AUTO: 102.2 FL (ref 81.4–97.8)
MCV RBC AUTO: 102.5 FL (ref 81.4–97.8)
MCV RBC AUTO: 102.9 FL (ref 81.4–97.8)
MCV RBC AUTO: 102.9 FL (ref 81.4–97.8)
MCV RBC AUTO: 103 FL (ref 81.4–97.8)
MCV RBC AUTO: 103.1 FL (ref 81.4–97.8)
MCV RBC AUTO: 103.4 FL (ref 81.4–97.8)
MCV RBC AUTO: 104.2 FL (ref 81.4–97.8)
MCV RBC AUTO: 104.3 FL (ref 81.4–97.8)
MCV RBC AUTO: 104.8 FL (ref 81.4–97.8)
MCV RBC AUTO: 105.2 FL (ref 81.4–97.8)
MESOTHL CELL NFR FLD: 12 %
METAMYELOCYTES NFR BLD MANUAL: 0.8 %
METAMYELOCYTES NFR BLD MANUAL: 0.9 %
METAMYELOCYTES NFR BLD MANUAL: 0.9 %
MICRO URNS: ABNORMAL
MICRO URNS: NORMAL
MICRO URNS: NORMAL
MONOCYTES # BLD AUTO: 0.67 K/UL (ref 0–0.85)
MONOCYTES # BLD AUTO: 0.74 K/UL (ref 0–0.85)
MONOCYTES # BLD AUTO: 0.83 K/UL (ref 0–0.85)
MONOCYTES # BLD AUTO: 0.85 K/UL (ref 0–0.85)
MONOCYTES # BLD AUTO: 0.86 K/UL (ref 0–0.85)
MONOCYTES # BLD AUTO: 0.86 K/UL (ref 0–0.85)
MONOCYTES # BLD AUTO: 0.9 K/UL (ref 0–0.85)
MONOCYTES # BLD AUTO: 0.9 K/UL (ref 0–0.85)
MONOCYTES # BLD AUTO: 1.02 K/UL (ref 0–0.85)
MONOCYTES # BLD AUTO: 1.03 K/UL (ref 0–0.85)
MONOCYTES # BLD AUTO: 1.05 K/UL (ref 0–0.85)
MONOCYTES # BLD AUTO: 1.05 K/UL (ref 0–0.85)
MONOCYTES # BLD AUTO: 1.09 K/UL (ref 0–0.85)
MONOCYTES # BLD AUTO: 1.1 K/UL (ref 0–0.85)
MONOCYTES # BLD AUTO: 1.19 K/UL (ref 0–0.85)
MONOCYTES # BLD AUTO: 1.2 K/UL (ref 0–0.85)
MONOCYTES # BLD AUTO: 1.26 K/UL (ref 0–0.85)
MONOCYTES # BLD AUTO: 1.29 K/UL (ref 0–0.85)
MONOCYTES # BLD AUTO: 1.3 K/UL (ref 0–0.85)
MONOCYTES # BLD AUTO: 1.32 K/UL (ref 0–0.85)
MONOCYTES # BLD AUTO: 1.38 K/UL (ref 0–0.85)
MONOCYTES # BLD AUTO: 1.38 K/UL (ref 0–0.85)
MONOCYTES # BLD AUTO: 1.42 K/UL (ref 0–0.85)
MONOCYTES # BLD AUTO: 1.42 K/UL (ref 0–0.85)
MONOCYTES # BLD AUTO: 1.48 K/UL (ref 0–0.85)
MONOCYTES # BLD AUTO: 1.52 K/UL (ref 0–0.85)
MONOCYTES # BLD AUTO: 1.67 K/UL (ref 0–0.85)
MONOCYTES # BLD AUTO: 1.72 K/UL (ref 0–0.85)
MONOCYTES # BLD AUTO: 1.76 K/UL (ref 0–0.85)
MONOCYTES # BLD AUTO: 1.77 K/UL (ref 0–0.85)
MONOCYTES # BLD AUTO: 1.85 K/UL (ref 0–0.85)
MONOCYTES # BLD AUTO: 2.1 K/UL (ref 0–0.85)
MONOCYTES # BLD AUTO: 2.11 K/UL (ref 0–0.85)
MONOCYTES # BLD AUTO: 2.13 K/UL (ref 0–0.85)
MONOCYTES NFR BLD AUTO: 10.5 % (ref 0–13.4)
MONOCYTES NFR BLD AUTO: 10.8 % (ref 0–13.4)
MONOCYTES NFR BLD AUTO: 11.4 % (ref 0–13.4)
MONOCYTES NFR BLD AUTO: 11.9 % (ref 0–13.4)
MONOCYTES NFR BLD AUTO: 12.7 % (ref 0–13.4)
MONOCYTES NFR BLD AUTO: 12.9 % (ref 0–13.4)
MONOCYTES NFR BLD AUTO: 13.3 % (ref 0–13.4)
MONOCYTES NFR BLD AUTO: 16.4 % (ref 0–13.4)
MONOCYTES NFR BLD AUTO: 3.4 % (ref 0–13.4)
MONOCYTES NFR BLD AUTO: 4.3 % (ref 0–13.4)
MONOCYTES NFR BLD AUTO: 4.3 % (ref 0–13.4)
MONOCYTES NFR BLD AUTO: 5.2 % (ref 0–13.4)
MONOCYTES NFR BLD AUTO: 5.2 % (ref 0–13.4)
MONOCYTES NFR BLD AUTO: 6.1 % (ref 0–13.4)
MONOCYTES NFR BLD AUTO: 6.1 % (ref 0–13.4)
MONOCYTES NFR BLD AUTO: 6.2 % (ref 0–13.4)
MONOCYTES NFR BLD AUTO: 6.8 % (ref 0–13.4)
MONOCYTES NFR BLD AUTO: 7 % (ref 0–13.4)
MONOCYTES NFR BLD AUTO: 7 % (ref 0–13.4)
MONOCYTES NFR BLD AUTO: 7.5 % (ref 0–13.4)
MONOCYTES NFR BLD AUTO: 7.6 % (ref 0–13.4)
MONOCYTES NFR BLD AUTO: 7.7 % (ref 0–13.4)
MONOCYTES NFR BLD AUTO: 7.9 % (ref 0–13.4)
MONOCYTES NFR BLD AUTO: 7.9 % (ref 0–13.4)
MONOCYTES NFR BLD AUTO: 8.1 % (ref 0–13.4)
MONOCYTES NFR BLD AUTO: 8.2 % (ref 0–13.4)
MONOCYTES NFR BLD AUTO: 8.3 % (ref 0–13.4)
MONOCYTES NFR BLD AUTO: 8.5 % (ref 0–13.4)
MONOCYTES NFR BLD AUTO: 8.6 % (ref 0–13.4)
MONOCYTES NFR BLD AUTO: 8.8 % (ref 0–13.4)
MONOCYTES NFR BLD AUTO: 8.8 % (ref 0–13.4)
MONOCYTES NFR BLD AUTO: 9 % (ref 0–13.4)
MONOCYTES NFR BLD AUTO: 9.3 % (ref 0–13.4)
MONOCYTES NFR BLD AUTO: 9.3 % (ref 0–13.4)
MONONUC CELLS NFR FLD: 23 %
MORPHOLOGY BLD-IMP: NORMAL
MRSA DNA SPEC QL NAA+PROBE: NORMAL
MYELOCYTES NFR BLD MANUAL: 0.9 %
MYELOCYTES NFR BLD MANUAL: 2.6 %
NEUTROPHILS # BLD AUTO: 10.25 K/UL (ref 2–7.15)
NEUTROPHILS # BLD AUTO: 10.39 K/UL (ref 2–7.15)
NEUTROPHILS # BLD AUTO: 10.45 K/UL (ref 2–7.15)
NEUTROPHILS # BLD AUTO: 10.61 K/UL (ref 2–7.15)
NEUTROPHILS # BLD AUTO: 11.82 K/UL (ref 2–7.15)
NEUTROPHILS # BLD AUTO: 12.48 K/UL (ref 2–7.15)
NEUTROPHILS # BLD AUTO: 12.65 K/UL (ref 2–7.15)
NEUTROPHILS # BLD AUTO: 12.67 K/UL (ref 2–7.15)
NEUTROPHILS # BLD AUTO: 13.47 K/UL (ref 2–7.15)
NEUTROPHILS # BLD AUTO: 14.81 K/UL (ref 2–7.15)
NEUTROPHILS # BLD AUTO: 15.2 K/UL (ref 2–7.15)
NEUTROPHILS # BLD AUTO: 15.4 K/UL (ref 2–7.15)
NEUTROPHILS # BLD AUTO: 16.66 K/UL (ref 2–7.15)
NEUTROPHILS # BLD AUTO: 16.79 K/UL (ref 2–7.15)
NEUTROPHILS # BLD AUTO: 17.06 K/UL (ref 2–7.15)
NEUTROPHILS # BLD AUTO: 17.18 K/UL (ref 2–7.15)
NEUTROPHILS # BLD AUTO: 17.47 K/UL (ref 2–7.15)
NEUTROPHILS # BLD AUTO: 18.05 K/UL (ref 2–7.15)
NEUTROPHILS # BLD AUTO: 18.17 K/UL (ref 2–7.15)
NEUTROPHILS # BLD AUTO: 18.86 K/UL (ref 2–7.15)
NEUTROPHILS # BLD AUTO: 20.12 K/UL (ref 2–7.15)
NEUTROPHILS # BLD AUTO: 20.92 K/UL (ref 2–7.15)
NEUTROPHILS # BLD AUTO: 22.47 K/UL (ref 2–7.15)
NEUTROPHILS # BLD AUTO: 6.57 K/UL (ref 2–7.15)
NEUTROPHILS # BLD AUTO: 7.52 K/UL (ref 2–7.15)
NEUTROPHILS # BLD AUTO: 7.8 K/UL (ref 2–7.15)
NEUTROPHILS # BLD AUTO: 8.23 K/UL (ref 2–7.15)
NEUTROPHILS # BLD AUTO: 8.34 K/UL (ref 2–7.15)
NEUTROPHILS # BLD AUTO: 8.55 K/UL (ref 2–7.15)
NEUTROPHILS # BLD AUTO: 8.94 K/UL (ref 2–7.15)
NEUTROPHILS # BLD AUTO: 8.96 K/UL (ref 2–7.15)
NEUTROPHILS # BLD AUTO: 9.52 K/UL (ref 2–7.15)
NEUTROPHILS # BLD AUTO: 9.57 K/UL (ref 2–7.15)
NEUTROPHILS # BLD AUTO: 9.76 K/UL (ref 2–7.15)
NEUTROPHILS NFR BLD: 70.2 % (ref 44–72)
NEUTROPHILS NFR BLD: 70.7 % (ref 44–72)
NEUTROPHILS NFR BLD: 71.6 % (ref 44–72)
NEUTROPHILS NFR BLD: 72.9 % (ref 44–72)
NEUTROPHILS NFR BLD: 74.8 % (ref 44–72)
NEUTROPHILS NFR BLD: 75.3 % (ref 44–72)
NEUTROPHILS NFR BLD: 76 % (ref 44–72)
NEUTROPHILS NFR BLD: 76.3 % (ref 44–72)
NEUTROPHILS NFR BLD: 77.9 % (ref 44–72)
NEUTROPHILS NFR BLD: 78 % (ref 44–72)
NEUTROPHILS NFR BLD: 78.1 % (ref 44–72)
NEUTROPHILS NFR BLD: 78.3 % (ref 44–72)
NEUTROPHILS NFR BLD: 78.4 % (ref 44–72)
NEUTROPHILS NFR BLD: 78.8 % (ref 44–72)
NEUTROPHILS NFR BLD: 78.9 % (ref 44–72)
NEUTROPHILS NFR BLD: 79.5 % (ref 44–72)
NEUTROPHILS NFR BLD: 80.3 % (ref 44–72)
NEUTROPHILS NFR BLD: 80.8 % (ref 44–72)
NEUTROPHILS NFR BLD: 80.8 % (ref 44–72)
NEUTROPHILS NFR BLD: 81 % (ref 44–72)
NEUTROPHILS NFR BLD: 81.5 % (ref 44–72)
NEUTROPHILS NFR BLD: 81.8 % (ref 44–72)
NEUTROPHILS NFR BLD: 82.1 % (ref 44–72)
NEUTROPHILS NFR BLD: 82.3 % (ref 44–72)
NEUTROPHILS NFR BLD: 82.6 % (ref 44–72)
NEUTROPHILS NFR BLD: 83.2 % (ref 44–72)
NEUTROPHILS NFR BLD: 83.2 % (ref 44–72)
NEUTROPHILS NFR BLD: 83.7 % (ref 44–72)
NEUTROPHILS NFR BLD: 84.4 % (ref 44–72)
NEUTROPHILS NFR BLD: 85.1 % (ref 44–72)
NEUTROPHILS NFR BLD: 85.2 % (ref 44–72)
NEUTROPHILS NFR BLD: 85.2 % (ref 44–72)
NEUTROPHILS NFR BLD: 85.3 % (ref 44–72)
NEUTROPHILS NFR BLD: 88.8 % (ref 44–72)
NEUTROPHILS NFR FLD: 29 %
NEUTS BAND NFR BLD MANUAL: 1.7 % (ref 0–10)
NEUTS BAND NFR BLD MANUAL: 1.8 % (ref 0–10)
NEUTS BAND NFR BLD MANUAL: 2.6 % (ref 0–10)
NEUTS BAND NFR BLD MANUAL: 3.5 % (ref 0–10)
NEUTS BAND NFR BLD MANUAL: 5 % (ref 0–10)
NITRITE UR QL STRIP.AUTO: NEGATIVE
NRBC # BLD AUTO: 0 K/UL
NRBC BLD-RTO: 0 /100 WBC
OVALOCYTES BLD QL SMEAR: NORMAL
OVALOCYTES BLD QL SMEAR: NORMAL
PATH REV: NORMAL
PATH REV: NORMAL
PH FLD: 8 [PH]
PH UR STRIP.AUTO: 5 [PH]
PH UR STRIP.AUTO: 5.5 [PH]
PH UR STRIP.AUTO: 6 [PH]
PLATELET # BLD AUTO: 194 K/UL (ref 164–446)
PLATELET # BLD AUTO: 204 K/UL (ref 164–446)
PLATELET # BLD AUTO: 211 K/UL (ref 164–446)
PLATELET # BLD AUTO: 211 K/UL (ref 164–446)
PLATELET # BLD AUTO: 216 K/UL (ref 164–446)
PLATELET # BLD AUTO: 216 K/UL (ref 164–446)
PLATELET # BLD AUTO: 219 K/UL (ref 164–446)
PLATELET # BLD AUTO: 220 K/UL (ref 164–446)
PLATELET # BLD AUTO: 224 K/UL (ref 164–446)
PLATELET # BLD AUTO: 224 K/UL (ref 164–446)
PLATELET # BLD AUTO: 225 K/UL (ref 164–446)
PLATELET # BLD AUTO: 256 K/UL (ref 164–446)
PLATELET # BLD AUTO: 262 K/UL (ref 164–446)
PLATELET # BLD AUTO: 263 K/UL (ref 164–446)
PLATELET # BLD AUTO: 337 K/UL (ref 164–446)
PLATELET # BLD AUTO: 385 K/UL (ref 164–446)
PLATELET # BLD AUTO: 386 K/UL (ref 164–446)
PLATELET # BLD AUTO: 397 K/UL (ref 164–446)
PLATELET # BLD AUTO: 403 K/UL (ref 164–446)
PLATELET # BLD AUTO: 403 K/UL (ref 164–446)
PLATELET # BLD AUTO: 406 K/UL (ref 164–446)
PLATELET # BLD AUTO: 412 K/UL (ref 164–446)
PLATELET # BLD AUTO: 415 K/UL (ref 164–446)
PLATELET # BLD AUTO: 418 K/UL (ref 164–446)
PLATELET # BLD AUTO: 425 K/UL (ref 164–446)
PLATELET # BLD AUTO: 437 K/UL (ref 164–446)
PLATELET # BLD AUTO: 438 K/UL (ref 164–446)
PLATELET # BLD AUTO: 446 K/UL (ref 164–446)
PLATELET # BLD AUTO: 476 K/UL (ref 164–446)
PLATELET # BLD AUTO: 480 K/UL (ref 164–446)
PLATELET # BLD AUTO: 497 K/UL (ref 164–446)
PLATELET # BLD AUTO: 519 K/UL (ref 164–446)
PLATELET # BLD AUTO: 529 K/UL (ref 164–446)
PLATELET # BLD AUTO: 530 K/UL (ref 164–446)
PLATELET # BLD AUTO: 540 K/UL (ref 164–446)
PLATELET # BLD AUTO: 554 K/UL (ref 164–446)
PLATELET BLD QL SMEAR: NORMAL
PMV BLD AUTO: 10 FL (ref 9–12.9)
PMV BLD AUTO: 10.1 FL (ref 9–12.9)
PMV BLD AUTO: 10.1 FL (ref 9–12.9)
PMV BLD AUTO: 10.2 FL (ref 9–12.9)
PMV BLD AUTO: 10.3 FL (ref 9–12.9)
PMV BLD AUTO: 10.3 FL (ref 9–12.9)
PMV BLD AUTO: 10.4 FL (ref 9–12.9)
PMV BLD AUTO: 10.4 FL (ref 9–12.9)
PMV BLD AUTO: 11 FL (ref 9–12.9)
PMV BLD AUTO: 9.1 FL (ref 9–12.9)
PMV BLD AUTO: 9.1 FL (ref 9–12.9)
PMV BLD AUTO: 9.2 FL (ref 9–12.9)
PMV BLD AUTO: 9.2 FL (ref 9–12.9)
PMV BLD AUTO: 9.3 FL (ref 9–12.9)
PMV BLD AUTO: 9.4 FL (ref 9–12.9)
PMV BLD AUTO: 9.5 FL (ref 9–12.9)
PMV BLD AUTO: 9.5 FL (ref 9–12.9)
PMV BLD AUTO: 9.6 FL (ref 9–12.9)
PMV BLD AUTO: 9.7 FL (ref 9–12.9)
PMV BLD AUTO: 9.7 FL (ref 9–12.9)
PMV BLD AUTO: 9.8 FL (ref 9–12.9)
PMV BLD AUTO: 9.9 FL (ref 9–12.9)
POIKILOCYTOSIS BLD QL SMEAR: NORMAL
POLYCHROMASIA BLD QL SMEAR: NORMAL
POTASSIUM SERPL-SCNC: 3 MMOL/L (ref 3.6–5.5)
POTASSIUM SERPL-SCNC: 3.1 MMOL/L (ref 3.6–5.5)
POTASSIUM SERPL-SCNC: 3.2 MMOL/L (ref 3.6–5.5)
POTASSIUM SERPL-SCNC: 3.4 MMOL/L (ref 3.6–5.5)
POTASSIUM SERPL-SCNC: 3.4 MMOL/L (ref 3.6–5.5)
POTASSIUM SERPL-SCNC: 3.5 MMOL/L (ref 3.6–5.5)
POTASSIUM SERPL-SCNC: 3.6 MMOL/L (ref 3.6–5.5)
POTASSIUM SERPL-SCNC: 3.6 MMOL/L (ref 3.6–5.5)
POTASSIUM SERPL-SCNC: 3.7 MMOL/L (ref 3.6–5.5)
POTASSIUM SERPL-SCNC: 3.8 MMOL/L (ref 3.6–5.5)
POTASSIUM SERPL-SCNC: 4 MMOL/L (ref 3.6–5.5)
POTASSIUM SERPL-SCNC: 4.1 MMOL/L (ref 3.6–5.5)
POTASSIUM SERPL-SCNC: 4.2 MMOL/L (ref 3.6–5.5)
POTASSIUM SERPL-SCNC: 4.2 MMOL/L (ref 3.6–5.5)
POTASSIUM SERPL-SCNC: 4.3 MMOL/L (ref 3.6–5.5)
POTASSIUM SERPL-SCNC: 4.4 MMOL/L (ref 3.6–5.5)
POTASSIUM SERPL-SCNC: 4.4 MMOL/L (ref 3.6–5.5)
POTASSIUM SERPL-SCNC: 4.7 MMOL/L (ref 3.6–5.5)
POTASSIUM SERPL-SCNC: 5 MMOL/L (ref 3.6–5.5)
PROCALCITONIN SERPL-MCNC: 0.76 NG/ML
PROCALCITONIN SERPL-MCNC: 0.77 NG/ML
PROCALCITONIN SERPL-MCNC: 0.84 NG/ML
PROCALCITONIN SERPL-MCNC: 1.06 NG/ML
PROCALCITONIN SERPL-MCNC: 1.95 NG/ML
PROT FLD-MCNC: 1.7 G/DL
PROT SERPL-MCNC: 5 G/DL (ref 6–8.2)
PROT SERPL-MCNC: 5.1 G/DL (ref 6–8.2)
PROT SERPL-MCNC: 5.1 G/DL (ref 6–8.2)
PROT SERPL-MCNC: 5.5 G/DL (ref 6–8.2)
PROT SERPL-MCNC: 5.5 G/DL (ref 6–8.2)
PROT SERPL-MCNC: 5.7 G/DL (ref 6–8.2)
PROT SERPL-MCNC: 5.7 G/DL (ref 6–8.2)
PROT SERPL-MCNC: 5.8 G/DL (ref 6–8.2)
PROT SERPL-MCNC: 5.9 G/DL (ref 6–8.2)
PROT SERPL-MCNC: 5.9 G/DL (ref 6–8.2)
PROT SERPL-MCNC: 6 G/DL (ref 6–8.2)
PROT SERPL-MCNC: 6.4 G/DL (ref 6–8.2)
PROT SERPL-MCNC: 6.6 G/DL (ref 6–8.2)
PROT SERPL-MCNC: 6.9 G/DL (ref 6–8.2)
PROT SERPL-MCNC: 7 G/DL (ref 6–8.2)
PROT UR QL STRIP: 100 MG/DL
PROT UR QL STRIP: 30 MG/DL
PROT UR QL STRIP: NEGATIVE MG/DL
PROTHROMBIN TIME: 14.2 SEC (ref 12–14.6)
PROTHROMBIN TIME: 14.5 SEC (ref 12–14.6)
PROTHROMBIN TIME: 14.9 SEC (ref 12–14.6)
PROTHROMBIN TIME: 16.2 SEC (ref 12–14.6)
PROTHROMBIN TIME: 16.5 SEC (ref 12–14.6)
RBC # BLD AUTO: 2.57 M/UL (ref 4.2–5.4)
RBC # BLD AUTO: 2.59 M/UL (ref 4.2–5.4)
RBC # BLD AUTO: 2.63 M/UL (ref 4.2–5.4)
RBC # BLD AUTO: 2.64 M/UL (ref 4.2–5.4)
RBC # BLD AUTO: 2.69 M/UL (ref 4.2–5.4)
RBC # BLD AUTO: 2.72 M/UL (ref 4.2–5.4)
RBC # BLD AUTO: 2.79 M/UL (ref 4.2–5.4)
RBC # BLD AUTO: 2.85 M/UL (ref 4.2–5.4)
RBC # BLD AUTO: 2.88 M/UL (ref 4.2–5.4)
RBC # BLD AUTO: 2.91 M/UL (ref 4.2–5.4)
RBC # BLD AUTO: 2.93 M/UL (ref 4.2–5.4)
RBC # BLD AUTO: 2.94 M/UL (ref 4.2–5.4)
RBC # BLD AUTO: 2.95 M/UL (ref 4.2–5.4)
RBC # BLD AUTO: 2.96 M/UL (ref 4.2–5.4)
RBC # BLD AUTO: 2.98 M/UL (ref 4.2–5.4)
RBC # BLD AUTO: 3.08 M/UL (ref 4.2–5.4)
RBC # BLD AUTO: 3.1 M/UL (ref 4.2–5.4)
RBC # BLD AUTO: 3.27 M/UL (ref 4.2–5.4)
RBC # BLD AUTO: 3.31 M/UL (ref 4.2–5.4)
RBC # BLD AUTO: 3.38 M/UL (ref 4.2–5.4)
RBC # BLD AUTO: 3.4 M/UL (ref 4.2–5.4)
RBC # BLD AUTO: 3.5 M/UL (ref 4.2–5.4)
RBC # BLD AUTO: 3.55 M/UL (ref 4.2–5.4)
RBC # BLD AUTO: 3.56 M/UL (ref 4.2–5.4)
RBC # BLD AUTO: 3.58 M/UL (ref 4.2–5.4)
RBC # BLD AUTO: 3.59 M/UL (ref 4.2–5.4)
RBC # BLD AUTO: 3.63 M/UL (ref 4.2–5.4)
RBC # BLD AUTO: 3.64 M/UL (ref 4.2–5.4)
RBC # BLD AUTO: 3.64 M/UL (ref 4.2–5.4)
RBC # BLD AUTO: 3.72 M/UL (ref 4.2–5.4)
RBC # BLD AUTO: 3.76 M/UL (ref 4.2–5.4)
RBC # BLD AUTO: 3.77 M/UL (ref 4.2–5.4)
RBC # BLD AUTO: 3.83 M/UL (ref 4.2–5.4)
RBC # BLD AUTO: 4.19 M/UL (ref 4.2–5.4)
RBC # FLD: <2000 CELLS/UL
RBC # URNS HPF: ABNORMAL /HPF
RBC # URNS HPF: ABNORMAL /HPF
RBC BLD AUTO: PRESENT
RBC UR QL AUTO: NEGATIVE
SIGNIFICANT IND 70042: NORMAL
SITE SITE: NORMAL
SODIUM SERPL-SCNC: 131 MMOL/L (ref 135–145)
SODIUM SERPL-SCNC: 131 MMOL/L (ref 135–145)
SODIUM SERPL-SCNC: 132 MMOL/L (ref 135–145)
SODIUM SERPL-SCNC: 133 MMOL/L (ref 135–145)
SODIUM SERPL-SCNC: 134 MMOL/L (ref 135–145)
SODIUM SERPL-SCNC: 135 MMOL/L (ref 135–145)
SODIUM SERPL-SCNC: 136 MMOL/L (ref 135–145)
SODIUM SERPL-SCNC: 137 MMOL/L (ref 135–145)
SODIUM SERPL-SCNC: 138 MMOL/L (ref 135–145)
SODIUM SERPL-SCNC: 140 MMOL/L (ref 135–145)
SOURCE SOURCE: NORMAL
SP GR UR STRIP.AUTO: 1.01
SP GR UR STRIP.AUTO: 1.02
TARGETS BLD QL SMEAR: NORMAL
TARGETS BLD QL SMEAR: NORMAL
TROPONIN I SERPL-MCNC: <0.01 NG/ML (ref 0–0.04)
TROPONIN I SERPL-MCNC: <0.01 NG/ML (ref 0–0.04)
TSH SERPL DL<=0.005 MIU/L-ACNC: 3.9 UIU/ML (ref 0.38–5.33)
TSH SERPL DL<=0.005 MIU/L-ACNC: 5.48 UIU/ML (ref 0.38–5.33)
UROBILINOGEN UR STRIP.AUTO-MCNC: 0.2 MG/DL
UROBILINOGEN UR STRIP.AUTO-MCNC: 1 MG/DL
VIT B12 SERPL-MCNC: >1500 PG/ML (ref 211–911)
WBC # BLD AUTO: 10.2 K/UL (ref 4.8–10.8)
WBC # BLD AUTO: 10.3 K/UL (ref 4.8–10.8)
WBC # BLD AUTO: 10.7 K/UL (ref 4.8–10.8)
WBC # BLD AUTO: 10.9 K/UL (ref 4.8–10.8)
WBC # BLD AUTO: 11 K/UL (ref 4.8–10.8)
WBC # BLD AUTO: 11.4 K/UL (ref 4.8–10.8)
WBC # BLD AUTO: 11.4 K/UL (ref 4.8–10.8)
WBC # BLD AUTO: 11.5 K/UL (ref 4.8–10.8)
WBC # BLD AUTO: 11.6 K/UL (ref 4.8–10.8)
WBC # BLD AUTO: 12.3 K/UL (ref 4.8–10.8)
WBC # BLD AUTO: 12.8 K/UL (ref 4.8–10.8)
WBC # BLD AUTO: 13 K/UL (ref 4.8–10.8)
WBC # BLD AUTO: 13.3 K/UL (ref 4.8–10.8)
WBC # BLD AUTO: 13.3 K/UL (ref 4.8–10.8)
WBC # BLD AUTO: 13.9 K/UL (ref 4.8–10.8)
WBC # BLD AUTO: 13.9 K/UL (ref 4.8–10.8)
WBC # BLD AUTO: 15.1 K/UL (ref 4.8–10.8)
WBC # BLD AUTO: 15.2 K/UL (ref 4.8–10.8)
WBC # BLD AUTO: 15.5 K/UL (ref 4.8–10.8)
WBC # BLD AUTO: 17.7 K/UL (ref 4.8–10.8)
WBC # BLD AUTO: 17.8 K/UL (ref 4.8–10.8)
WBC # BLD AUTO: 18.1 K/UL (ref 4.8–10.8)
WBC # BLD AUTO: 18.9 K/UL (ref 4.8–10.8)
WBC # BLD AUTO: 19.7 K/UL (ref 4.8–10.8)
WBC # BLD AUTO: 19.9 K/UL (ref 4.8–10.8)
WBC # BLD AUTO: 20.4 K/UL (ref 4.8–10.8)
WBC # BLD AUTO: 20.8 K/UL (ref 4.8–10.8)
WBC # BLD AUTO: 20.9 K/UL (ref 4.8–10.8)
WBC # BLD AUTO: 21.8 K/UL (ref 4.8–10.8)
WBC # BLD AUTO: 22.3 K/UL (ref 4.8–10.8)
WBC # BLD AUTO: 22.6 K/UL (ref 4.8–10.8)
WBC # BLD AUTO: 24.3 K/UL (ref 4.8–10.8)
WBC # BLD AUTO: 24.9 K/UL (ref 4.8–10.8)
WBC # BLD AUTO: 25.3 K/UL (ref 4.8–10.8)
WBC # BLD AUTO: 9.3 K/UL (ref 4.8–10.8)
WBC # BLD AUTO: 9.4 K/UL (ref 4.8–10.8)
WBC # FLD: 296 CELLS/UL
WBC #/AREA URNS HPF: ABNORMAL /HPF
WBC #/AREA URNS HPF: ABNORMAL /HPF
WBC OTHER NFR FLD: 1 %

## 2018-01-01 PROCEDURE — 36415 COLL VENOUS BLD VENIPUNCTURE: CPT

## 2018-01-01 PROCEDURE — 700111 HCHG RX REV CODE 636 W/ 250 OVERRIDE (IP): Performed by: HOSPITALIST

## 2018-01-01 PROCEDURE — 700111 HCHG RX REV CODE 636 W/ 250 OVERRIDE (IP)

## 2018-01-01 PROCEDURE — 700111 HCHG RX REV CODE 636 W/ 250 OVERRIDE (IP): Performed by: INTERNAL MEDICINE

## 2018-01-01 PROCEDURE — 94760 N-INVAS EAR/PLS OXIMETRY 1: CPT

## 2018-01-01 PROCEDURE — 700101 HCHG RX REV CODE 250: Performed by: INTERNAL MEDICINE

## 2018-01-01 PROCEDURE — 83880 ASSAY OF NATRIURETIC PEPTIDE: CPT

## 2018-01-01 PROCEDURE — 82040 ASSAY OF SERUM ALBUMIN: CPT

## 2018-01-01 PROCEDURE — 97161 PT EVAL LOW COMPLEX 20 MIN: CPT

## 2018-01-01 PROCEDURE — A9270 NON-COVERED ITEM OR SERVICE: HCPCS | Performed by: HOSPITALIST

## 2018-01-01 PROCEDURE — 85007 BL SMEAR W/DIFF WBC COUNT: CPT

## 2018-01-01 PROCEDURE — 160209 HCHG ENDO MINUTES - EA ADDL 1 MIN LEVEL 5: Performed by: INTERNAL MEDICINE

## 2018-01-01 PROCEDURE — 80048 BASIC METABOLIC PNL TOTAL CA: CPT

## 2018-01-01 PROCEDURE — 700111 HCHG RX REV CODE 636 W/ 250 OVERRIDE (IP): Performed by: FAMILY MEDICINE

## 2018-01-01 PROCEDURE — 96361 HYDRATE IV INFUSION ADD-ON: CPT

## 2018-01-01 PROCEDURE — 86301 IMMUNOASSAY TUMOR CA 19-9: CPT

## 2018-01-01 PROCEDURE — 0DJD8ZZ INSPECTION OF LOWER INTESTINAL TRACT, VIA NATURAL OR ARTIFICIAL OPENING ENDOSCOPIC: ICD-10-PCS | Performed by: INTERNAL MEDICINE

## 2018-01-01 PROCEDURE — 80500 HCHG CLINICAL PATH CONSULT-LIMITED: CPT

## 2018-01-01 PROCEDURE — 85025 COMPLETE CBC W/AUTO DIFF WBC: CPT

## 2018-01-01 PROCEDURE — 6650990 HC HOSPICE AND HOME CARE RX REV CODE 0250

## 2018-01-01 PROCEDURE — 700102 HCHG RX REV CODE 250 W/ 637 OVERRIDE(OP): Performed by: INTERNAL MEDICINE

## 2018-01-01 PROCEDURE — 700102 HCHG RX REV CODE 250 W/ 637 OVERRIDE(OP): Performed by: FAMILY MEDICINE

## 2018-01-01 PROCEDURE — 700102 HCHG RX REV CODE 250 W/ 637 OVERRIDE(OP): Performed by: HOSPITALIST

## 2018-01-01 PROCEDURE — 700111 HCHG RX REV CODE 636 W/ 250 OVERRIDE (IP): Performed by: EMERGENCY MEDICINE

## 2018-01-01 PROCEDURE — 80053 COMPREHEN METABOLIC PANEL: CPT

## 2018-01-01 PROCEDURE — A9270 NON-COVERED ITEM OR SERVICE: HCPCS | Performed by: INTERNAL MEDICINE

## 2018-01-01 PROCEDURE — 99239 HOSP IP/OBS DSCHRG MGMT >30: CPT | Performed by: INTERNAL MEDICINE

## 2018-01-01 PROCEDURE — 87070 CULTURE OTHR SPECIMN AEROBIC: CPT

## 2018-01-01 PROCEDURE — 770009 HCHG ROOM/CARE - ONCOLOGY SEMI PRI*

## 2018-01-01 PROCEDURE — 87040 BLOOD CULTURE FOR BACTERIA: CPT

## 2018-01-01 PROCEDURE — 500066 HCHG BITE BLOCK, ECT: Performed by: INTERNAL MEDICINE

## 2018-01-01 PROCEDURE — S9126 HOSPICE CARE, IN THE HOME, P: HCPCS

## 2018-01-01 PROCEDURE — 700105 HCHG RX REV CODE 258: Performed by: INTERNAL MEDICINE

## 2018-01-01 PROCEDURE — 82150 ASSAY OF AMYLASE: CPT

## 2018-01-01 PROCEDURE — 83615 LACTATE (LD) (LDH) ENZYME: CPT | Mod: 91

## 2018-01-01 PROCEDURE — 83735 ASSAY OF MAGNESIUM: CPT | Mod: 91

## 2018-01-01 PROCEDURE — 82378 CARCINOEMBRYONIC ANTIGEN: CPT

## 2018-01-01 PROCEDURE — 99232 SBSQ HOSP IP/OBS MODERATE 35: CPT | Performed by: INTERNAL MEDICINE

## 2018-01-01 PROCEDURE — 99232 SBSQ HOSP IP/OBS MODERATE 35: CPT | Performed by: HOSPITALIST

## 2018-01-01 PROCEDURE — 700101 HCHG RX REV CODE 250

## 2018-01-01 PROCEDURE — 0JH63WZ INSERTION OF TOTALLY IMPLANTABLE VASCULAR ACCESS DEVICE INTO CHEST SUBCUTANEOUS TISSUE AND FASCIA, PERCUTANEOUS APPROACH: ICD-10-PCS | Performed by: RADIOLOGY

## 2018-01-01 PROCEDURE — 71046 X-RAY EXAM CHEST 2 VIEWS: CPT

## 2018-01-01 PROCEDURE — G8987 SELF CARE CURRENT STATUS: HCPCS | Mod: CI

## 2018-01-01 PROCEDURE — G8979 MOBILITY GOAL STATUS: HCPCS | Mod: CI

## 2018-01-01 PROCEDURE — 770004 HCHG ROOM/CARE - ONCOLOGY PRIVATE *

## 2018-01-01 PROCEDURE — 99233 SBSQ HOSP IP/OBS HIGH 50: CPT | Performed by: INTERNAL MEDICINE

## 2018-01-01 PROCEDURE — 700105 HCHG RX REV CODE 258: Performed by: HOSPITALIST

## 2018-01-01 PROCEDURE — 700105 HCHG RX REV CODE 258: Performed by: PHARMACIST

## 2018-01-01 PROCEDURE — 96374 THER/PROPH/DIAG INJ IV PUSH: CPT

## 2018-01-01 PROCEDURE — 770006 HCHG ROOM/CARE - MED/SURG/GYN SEMI*

## 2018-01-01 PROCEDURE — 0DJ08ZZ INSPECTION OF UPPER INTESTINAL TRACT, VIA NATURAL OR ARTIFICIAL OPENING ENDOSCOPIC: ICD-10-PCS | Performed by: INTERNAL MEDICINE

## 2018-01-01 PROCEDURE — 71045 X-RAY EXAM CHEST 1 VIEW: CPT

## 2018-01-01 PROCEDURE — 96375 TX/PRO/DX INJ NEW DRUG ADDON: CPT

## 2018-01-01 PROCEDURE — 96376 TX/PRO/DX INJ SAME DRUG ADON: CPT

## 2018-01-01 PROCEDURE — 86300 IMMUNOASSAY TUMOR CA 15-3: CPT

## 2018-01-01 PROCEDURE — 88341 IMHCHEM/IMCYTCHM EA ADD ANTB: CPT

## 2018-01-01 PROCEDURE — 700111 HCHG RX REV CODE 636 W/ 250 OVERRIDE (IP): Performed by: PHARMACIST

## 2018-01-01 PROCEDURE — 97165 OT EVAL LOW COMPLEX 30 MIN: CPT

## 2018-01-01 PROCEDURE — 85027 COMPLETE CBC AUTOMATED: CPT

## 2018-01-01 PROCEDURE — 96366 THER/PROPH/DIAG IV INF ADDON: CPT

## 2018-01-01 PROCEDURE — 70553 MRI BRAIN STEM W/O & W/DYE: CPT

## 2018-01-01 PROCEDURE — 96365 THER/PROPH/DIAG IV INF INIT: CPT

## 2018-01-01 PROCEDURE — 81001 URINALYSIS AUTO W/SCOPE: CPT

## 2018-01-01 PROCEDURE — 51798 US URINE CAPACITY MEASURE: CPT

## 2018-01-01 PROCEDURE — G0299 HHS/HOSPICE OF RN EA 15 MIN: HCPCS

## 2018-01-01 PROCEDURE — 84484 ASSAY OF TROPONIN QUANT: CPT

## 2018-01-01 PROCEDURE — 87205 SMEAR GRAM STAIN: CPT

## 2018-01-01 PROCEDURE — A9270 NON-COVERED ITEM OR SERVICE: HCPCS | Performed by: FAMILY MEDICINE

## 2018-01-01 PROCEDURE — 70450 CT HEAD/BRAIN W/O DYE: CPT

## 2018-01-01 PROCEDURE — 74176 CT ABD & PELVIS W/O CONTRAST: CPT

## 2018-01-01 PROCEDURE — 85610 PROTHROMBIN TIME: CPT

## 2018-01-01 PROCEDURE — 88307 TISSUE EXAM BY PATHOLOGIST: CPT

## 2018-01-01 PROCEDURE — A9552 F18 FDG: HCPCS

## 2018-01-01 PROCEDURE — 160204 HCHG ENDO MINUTES - 1ST 30 MINS LEVEL 5: Performed by: INTERNAL MEDICINE

## 2018-01-01 PROCEDURE — 97530 THERAPEUTIC ACTIVITIES: CPT

## 2018-01-01 PROCEDURE — 99223 1ST HOSP IP/OBS HIGH 75: CPT | Performed by: INTERNAL MEDICINE

## 2018-01-01 PROCEDURE — 85730 THROMBOPLASTIN TIME PARTIAL: CPT

## 2018-01-01 PROCEDURE — 71250 CT THORAX DX C-: CPT

## 2018-01-01 PROCEDURE — 84145 PROCALCITONIN (PCT): CPT

## 2018-01-01 PROCEDURE — 99223 1ST HOSP IP/OBS HIGH 75: CPT | Performed by: HOSPITALIST

## 2018-01-01 PROCEDURE — 83605 ASSAY OF LACTIC ACID: CPT | Mod: 91

## 2018-01-01 PROCEDURE — 700105 HCHG RX REV CODE 258: Performed by: FAMILY MEDICINE

## 2018-01-01 PROCEDURE — 89051 BODY FLUID CELL COUNT: CPT

## 2018-01-01 PROCEDURE — 83605 ASSAY OF LACTIC ACID: CPT

## 2018-01-01 PROCEDURE — 82105 ALPHA-FETOPROTEIN SERUM: CPT

## 2018-01-01 PROCEDURE — 99233 SBSQ HOSP IP/OBS HIGH 50: CPT | Performed by: FAMILY MEDICINE

## 2018-01-01 PROCEDURE — 81003 URINALYSIS AUTO W/O SCOPE: CPT

## 2018-01-01 PROCEDURE — 306581 SET INFUSION,POWERLOC 20G X 1: Performed by: EMERGENCY MEDICINE

## 2018-01-01 PROCEDURE — 02HV33Z INSERTION OF INFUSION DEVICE INTO SUPERIOR VENA CAVA, PERCUTANEOUS APPROACH: ICD-10-PCS | Performed by: RADIOLOGY

## 2018-01-01 PROCEDURE — 87102 FUNGUS ISOLATION CULTURE: CPT

## 2018-01-01 PROCEDURE — A9579 GAD-BASE MR CONTRAST NOS,1ML: HCPCS | Performed by: HOSPITALIST

## 2018-01-01 PROCEDURE — 82746 ASSAY OF FOLIC ACID SERUM: CPT

## 2018-01-01 PROCEDURE — 87086 URINE CULTURE/COLONY COUNT: CPT

## 2018-01-01 PROCEDURE — 93010 ELECTROCARDIOGRAM REPORT: CPT | Performed by: INTERNAL MEDICINE

## 2018-01-01 PROCEDURE — 700111 HCHG RX REV CODE 636 W/ 250 OVERRIDE (IP): Performed by: RADIOLOGY

## 2018-01-01 PROCEDURE — 160009 HCHG ANES TIME/MIN: Performed by: INTERNAL MEDICINE

## 2018-01-01 PROCEDURE — 160035 HCHG PACU - 1ST 60 MINS PHASE I: Performed by: INTERNAL MEDICINE

## 2018-01-01 PROCEDURE — 99285 EMERGENCY DEPT VISIT HI MDM: CPT

## 2018-01-01 PROCEDURE — 99233 SBSQ HOSP IP/OBS HIGH 50: CPT | Performed by: HOSPITALIST

## 2018-01-01 PROCEDURE — 82607 VITAMIN B-12: CPT

## 2018-01-01 PROCEDURE — 99153 MOD SED SAME PHYS/QHP EA: CPT

## 2018-01-01 PROCEDURE — 74018 RADEX ABDOMEN 1 VIEW: CPT

## 2018-01-01 PROCEDURE — 74183 MRI ABD W/O CNTR FLWD CNTR: CPT

## 2018-01-01 PROCEDURE — 84443 ASSAY THYROID STIM HORMONE: CPT

## 2018-01-01 PROCEDURE — G8978 MOBILITY CURRENT STATUS: HCPCS | Mod: CJ

## 2018-01-01 PROCEDURE — 87040 BLOOD CULTURE FOR BACTERIA: CPT | Mod: 91

## 2018-01-01 PROCEDURE — 47000 NEEDLE BIOPSY OF LIVER PERQ: CPT

## 2018-01-01 PROCEDURE — 86304 IMMUNOASSAY TUMOR CA 125: CPT

## 2018-01-01 PROCEDURE — 700105 HCHG RX REV CODE 258: Performed by: EMERGENCY MEDICINE

## 2018-01-01 PROCEDURE — A9270 NON-COVERED ITEM OR SERVICE: HCPCS

## 2018-01-01 PROCEDURE — 93005 ELECTROCARDIOGRAM TRACING: CPT | Performed by: INTERNAL MEDICINE

## 2018-01-01 PROCEDURE — A9270 NON-COVERED ITEM OR SERVICE: HCPCS | Performed by: NURSE PRACTITIONER

## 2018-01-01 PROCEDURE — 4410569 US-THORACENTESIS PUNCTURE

## 2018-01-01 PROCEDURE — 83036 HEMOGLOBIN GLYCOSYLATED A1C: CPT

## 2018-01-01 PROCEDURE — G0156 HHCP-SVS OF AIDE,EA 15 MIN: HCPCS

## 2018-01-01 PROCEDURE — 99406 BEHAV CHNG SMOKING 3-10 MIN: CPT | Performed by: HOSPITALIST

## 2018-01-01 PROCEDURE — 87015 SPECIMEN INFECT AGNT CONCNTJ: CPT

## 2018-01-01 PROCEDURE — G8989 SELF CARE D/C STATUS: HCPCS | Mod: CI

## 2018-01-01 PROCEDURE — 83986 ASSAY PH BODY FLUID NOS: CPT

## 2018-01-01 PROCEDURE — 88342 IMHCHEM/IMCYTCHM 1ST ANTB: CPT

## 2018-01-01 PROCEDURE — 88305 TISSUE EXAM BY PATHOLOGIST: CPT

## 2018-01-01 PROCEDURE — 99223 1ST HOSP IP/OBS HIGH 75: CPT | Mod: 25 | Performed by: HOSPITALIST

## 2018-01-01 PROCEDURE — G8978 MOBILITY CURRENT STATUS: HCPCS | Mod: CK

## 2018-01-01 PROCEDURE — 83735 ASSAY OF MAGNESIUM: CPT

## 2018-01-01 PROCEDURE — 0FB13ZX EXCISION OF RIGHT LOBE LIVER, PERCUTANEOUS APPROACH, DIAGNOSTIC: ICD-10-PCS | Performed by: RADIOLOGY

## 2018-01-01 PROCEDURE — 83036 HEMOGLOBIN GLYCOSYLATED A1C: CPT | Mod: 91

## 2018-01-01 PROCEDURE — A9579 GAD-BASE MR CONTRAST NOS,1ML: HCPCS | Performed by: INTERNAL MEDICINE

## 2018-01-01 PROCEDURE — 304561 HCHG STAT O2

## 2018-01-01 PROCEDURE — 93005 ELECTROCARDIOGRAM TRACING: CPT | Performed by: EMERGENCY MEDICINE

## 2018-01-01 PROCEDURE — 700102 HCHG RX REV CODE 250 W/ 637 OVERRIDE(OP): Performed by: NURSE PRACTITIONER

## 2018-01-01 PROCEDURE — 84443 ASSAY THYROID STIM HORMONE: CPT | Mod: 91

## 2018-01-01 PROCEDURE — 99231 SBSQ HOSP IP/OBS SF/LOW 25: CPT | Performed by: FAMILY MEDICINE

## 2018-01-01 PROCEDURE — 93970 EXTREMITY STUDY: CPT

## 2018-01-01 PROCEDURE — 97116 GAIT TRAINING THERAPY: CPT

## 2018-01-01 PROCEDURE — 82945 GLUCOSE OTHER FLUID: CPT

## 2018-01-01 PROCEDURE — 700102 HCHG RX REV CODE 250 W/ 637 OVERRIDE(OP)

## 2018-01-01 PROCEDURE — 302128 INFUSION PUMP: Performed by: HOSPITALIST

## 2018-01-01 PROCEDURE — 700117 HCHG RX CONTRAST REV CODE 255: Performed by: INTERNAL MEDICINE

## 2018-01-01 PROCEDURE — 302118 SHAMPOO,NO RINSE: Performed by: INTERNAL MEDICINE

## 2018-01-01 PROCEDURE — 99239 HOSP IP/OBS DSCHRG MGMT >30: CPT | Performed by: HOSPITALIST

## 2018-01-01 PROCEDURE — 87641 MR-STAPH DNA AMP PROBE: CPT

## 2018-01-01 PROCEDURE — G8988 SELF CARE GOAL STATUS: HCPCS | Mod: CI

## 2018-01-01 PROCEDURE — 160002 HCHG RECOVERY MINUTES (STAT): Performed by: INTERNAL MEDICINE

## 2018-01-01 PROCEDURE — B548ZZA ULTRASONOGRAPHY OF SUPERIOR VENA CAVA, GUIDANCE: ICD-10-PCS | Performed by: RADIOLOGY

## 2018-01-01 PROCEDURE — 96367 TX/PROPH/DG ADDL SEQ IV INF: CPT

## 2018-01-01 PROCEDURE — 99223 1ST HOSP IP/OBS HIGH 75: CPT | Performed by: FAMILY MEDICINE

## 2018-01-01 PROCEDURE — 84157 ASSAY OF PROTEIN OTHER: CPT

## 2018-01-01 PROCEDURE — 97162 PT EVAL MOD COMPLEX 30 MIN: CPT

## 2018-01-01 PROCEDURE — 700117 HCHG RX CONTRAST REV CODE 255: Performed by: HOSPITALIST

## 2018-01-01 PROCEDURE — 76705 ECHO EXAM OF ABDOMEN: CPT

## 2018-01-01 PROCEDURE — 0W9B3ZZ DRAINAGE OF LEFT PLEURAL CAVITY, PERCUTANEOUS APPROACH: ICD-10-PCS | Performed by: RADIOLOGY

## 2018-01-01 PROCEDURE — 88112 CYTOPATH CELL ENHANCE TECH: CPT

## 2018-01-01 PROCEDURE — 160048 HCHG OR STATISTICAL LEVEL 1-5: Performed by: INTERNAL MEDICINE

## 2018-01-01 PROCEDURE — 83690 ASSAY OF LIPASE: CPT

## 2018-01-01 PROCEDURE — P9045 ALBUMIN (HUMAN), 5%, 250 ML: HCPCS | Performed by: INTERNAL MEDICINE

## 2018-01-01 PROCEDURE — 82140 ASSAY OF AMMONIA: CPT

## 2018-01-01 RX ORDER — ONDANSETRON 2 MG/ML
4 INJECTION INTRAMUSCULAR; INTRAVENOUS EVERY 4 HOURS PRN
Status: DISCONTINUED | OUTPATIENT
Start: 2018-01-01 | End: 2018-01-01 | Stop reason: HOSPADM

## 2018-01-01 RX ORDER — BUPROPION HYDROCHLORIDE 150 MG/1
150 TABLET, EXTENDED RELEASE ORAL 2 TIMES DAILY
Status: DISCONTINUED | OUTPATIENT
Start: 2018-01-01 | End: 2018-01-01 | Stop reason: HOSPADM

## 2018-01-01 RX ORDER — METOCLOPRAMIDE HYDROCHLORIDE 5 MG/ML
10 INJECTION INTRAMUSCULAR; INTRAVENOUS EVERY 6 HOURS
Status: DISCONTINUED | OUTPATIENT
Start: 2018-01-01 | End: 2018-01-01 | Stop reason: HOSPADM

## 2018-01-01 RX ORDER — ACYCLOVIR 200 MG/1
CAPSULE ORAL
Status: COMPLETED
Start: 2018-01-01 | End: 2018-01-01

## 2018-01-01 RX ORDER — PROMETHAZINE HYDROCHLORIDE 25 MG/1
12.5-25 TABLET ORAL EVERY 4 HOURS PRN
Status: CANCELLED | OUTPATIENT
Start: 2018-01-01

## 2018-01-01 RX ORDER — SODIUM CHLORIDE 9 MG/ML
1000 INJECTION, SOLUTION INTRAVENOUS ONCE
Status: COMPLETED | OUTPATIENT
Start: 2018-01-01 | End: 2018-01-01

## 2018-01-01 RX ORDER — SODIUM CHLORIDE AND POTASSIUM CHLORIDE 150; 900 MG/100ML; MG/100ML
INJECTION, SOLUTION INTRAVENOUS CONTINUOUS
Status: DISCONTINUED | OUTPATIENT
Start: 2018-01-01 | End: 2018-01-01

## 2018-01-01 RX ORDER — LABETALOL HYDROCHLORIDE 5 MG/ML
10 INJECTION, SOLUTION INTRAVENOUS EVERY 4 HOURS PRN
Status: DISCONTINUED | OUTPATIENT
Start: 2018-01-01 | End: 2018-01-01 | Stop reason: HOSPADM

## 2018-01-01 RX ORDER — PROMETHAZINE HYDROCHLORIDE 25 MG/1
25 TABLET ORAL EVERY 6 HOURS PRN
COMMUNITY

## 2018-01-01 RX ORDER — BISACODYL 10 MG
10 SUPPOSITORY, RECTAL RECTAL
Status: CANCELLED | OUTPATIENT
Start: 2018-01-01

## 2018-01-01 RX ORDER — PROMETHAZINE HYDROCHLORIDE 25 MG/1
12.5-25 SUPPOSITORY RECTAL EVERY 4 HOURS PRN
Status: DISCONTINUED | OUTPATIENT
Start: 2018-01-01 | End: 2018-01-01 | Stop reason: HOSPADM

## 2018-01-01 RX ORDER — OXYCODONE HYDROCHLORIDE 10 MG/1
10 TABLET ORAL
Status: DISCONTINUED | OUTPATIENT
Start: 2018-01-01 | End: 2018-01-01 | Stop reason: HOSPADM

## 2018-01-01 RX ORDER — HEPARIN SODIUM 5000 [USP'U]/ML
5000 INJECTION, SOLUTION INTRAVENOUS; SUBCUTANEOUS EVERY 8 HOURS
Status: DISCONTINUED | OUTPATIENT
Start: 2018-01-01 | End: 2018-01-01 | Stop reason: HOSPADM

## 2018-01-01 RX ORDER — POLYETHYLENE GLYCOL 3350 17 G/17G
1 POWDER, FOR SOLUTION ORAL
Status: DISCONTINUED | OUTPATIENT
Start: 2018-01-01 | End: 2018-01-01 | Stop reason: HOSPADM

## 2018-01-01 RX ORDER — MIDAZOLAM HYDROCHLORIDE 1 MG/ML
.5-2 INJECTION INTRAMUSCULAR; INTRAVENOUS PRN
Status: ACTIVE | OUTPATIENT
Start: 2018-01-01 | End: 2018-01-01

## 2018-01-01 RX ORDER — PROMETHAZINE HYDROCHLORIDE 25 MG/1
12.5-25 SUPPOSITORY RECTAL EVERY 4 HOURS PRN
Status: CANCELLED | OUTPATIENT
Start: 2018-01-01

## 2018-01-01 RX ORDER — HEPARIN SODIUM 5000 [USP'U]/ML
5000 INJECTION, SOLUTION INTRAVENOUS; SUBCUTANEOUS EVERY 8 HOURS
Status: CANCELLED | OUTPATIENT
Start: 2018-01-01

## 2018-01-01 RX ORDER — DOCUSATE SODIUM 100 MG/1
100 CAPSULE, LIQUID FILLED ORAL 2 TIMES DAILY PRN
Status: CANCELLED | OUTPATIENT
Start: 2018-01-01

## 2018-01-01 RX ORDER — AMOXICILLIN 250 MG
2 CAPSULE ORAL 2 TIMES DAILY
Status: DISCONTINUED | OUTPATIENT
Start: 2018-01-01 | End: 2018-01-01 | Stop reason: HOSPADM

## 2018-01-01 RX ORDER — POLYETHYLENE GLYCOL 3350 17 G/17G
1 POWDER, FOR SOLUTION ORAL
Status: CANCELLED | OUTPATIENT
Start: 2018-01-01

## 2018-01-01 RX ORDER — OXYCODONE HYDROCHLORIDE 5 MG/1
2.5 TABLET ORAL
Status: DISCONTINUED | OUTPATIENT
Start: 2018-01-01 | End: 2018-01-01

## 2018-01-01 RX ORDER — MORPHINE SULFATE 15 MG/1
45 TABLET, FILM COATED, EXTENDED RELEASE ORAL EVERY 12 HOURS
Status: DISCONTINUED | OUTPATIENT
Start: 2018-01-01 | End: 2018-01-01

## 2018-01-01 RX ORDER — SODIUM CHLORIDE 9 MG/ML
30 INJECTION, SOLUTION INTRAVENOUS
Status: DISCONTINUED | OUTPATIENT
Start: 2018-01-01 | End: 2018-01-01 | Stop reason: HOSPADM

## 2018-01-01 RX ORDER — PEG-3350, SODIUM SULFATE, SODIUM CHLORIDE, POTASSIUM CHLORIDE, SODIUM ASCORBATE AND ASCORBIC ACID 7.5-2.691G
100 KIT ORAL 2 TIMES DAILY
Status: COMPLETED | OUTPATIENT
Start: 2018-01-01 | End: 2018-01-01

## 2018-01-01 RX ORDER — PROMETHAZINE HYDROCHLORIDE 25 MG/1
12.5-25 TABLET ORAL EVERY 4 HOURS PRN
Status: DISCONTINUED | OUTPATIENT
Start: 2018-01-01 | End: 2018-01-01 | Stop reason: HOSPADM

## 2018-01-01 RX ORDER — LEVOFLOXACIN 5 MG/ML
750 INJECTION, SOLUTION INTRAVENOUS EVERY 24 HOURS
Status: COMPLETED | OUTPATIENT
Start: 2018-01-01 | End: 2018-01-01

## 2018-01-01 RX ORDER — BISACODYL 10 MG
10 SUPPOSITORY, RECTAL RECTAL
Status: DISCONTINUED | OUTPATIENT
Start: 2018-01-01 | End: 2018-01-01 | Stop reason: HOSPADM

## 2018-01-01 RX ORDER — MORPHINE SULFATE 15 MG/1
30 TABLET, FILM COATED, EXTENDED RELEASE ORAL EVERY 12 HOURS
Status: CANCELLED | OUTPATIENT
Start: 2018-01-01

## 2018-01-01 RX ORDER — GABAPENTIN 300 MG/1
300 CAPSULE ORAL 3 TIMES DAILY
Status: CANCELLED | OUTPATIENT
Start: 2018-01-01

## 2018-01-01 RX ORDER — POTASSIUM CHLORIDE 20 MEQ/1
40 TABLET, EXTENDED RELEASE ORAL 2 TIMES DAILY
Status: DISCONTINUED | OUTPATIENT
Start: 2018-01-01 | End: 2018-01-01 | Stop reason: HOSPADM

## 2018-01-01 RX ORDER — ACYCLOVIR 200 MG/1
200 CAPSULE ORAL 2 TIMES DAILY
Status: DISCONTINUED | OUTPATIENT
Start: 2018-01-01 | End: 2018-01-01 | Stop reason: HOSPADM

## 2018-01-01 RX ORDER — NICOTINE 21 MG/24HR
21 PATCH, TRANSDERMAL 24 HOURS TRANSDERMAL
Status: DISCONTINUED | OUTPATIENT
Start: 2018-01-01 | End: 2018-01-01 | Stop reason: HOSPADM

## 2018-01-01 RX ORDER — ONDANSETRON 4 MG/1
4 TABLET, ORALLY DISINTEGRATING ORAL EVERY 4 HOURS PRN
Status: DISCONTINUED | OUTPATIENT
Start: 2018-01-01 | End: 2018-01-01 | Stop reason: HOSPADM

## 2018-01-01 RX ORDER — HEPARIN SODIUM 5000 [USP'U]/ML
5000 INJECTION, SOLUTION INTRAVENOUS; SUBCUTANEOUS EVERY 8 HOURS
Status: DISCONTINUED | OUTPATIENT
Start: 2018-01-01 | End: 2018-01-01

## 2018-01-01 RX ORDER — MORPHINE SULFATE 4 MG/ML
4 INJECTION, SOLUTION INTRAMUSCULAR; INTRAVENOUS
Status: DISCONTINUED | OUTPATIENT
Start: 2018-01-01 | End: 2018-01-01 | Stop reason: HOSPADM

## 2018-01-01 RX ORDER — SODIUM CHLORIDE 9 MG/ML
INJECTION, SOLUTION INTRAVENOUS CONTINUOUS
Status: DISCONTINUED | OUTPATIENT
Start: 2018-01-01 | End: 2018-01-01

## 2018-01-01 RX ORDER — ONDANSETRON 4 MG/1
4 TABLET, ORALLY DISINTEGRATING ORAL EVERY 6 HOURS PRN
Status: ON HOLD | COMMUNITY
End: 2018-01-01

## 2018-01-01 RX ORDER — ENALAPRILAT 1.25 MG/ML
1.25 INJECTION INTRAVENOUS EVERY 6 HOURS PRN
Status: DISCONTINUED | OUTPATIENT
Start: 2018-01-01 | End: 2018-01-01 | Stop reason: HOSPADM

## 2018-01-01 RX ORDER — MORPHINE SULFATE 30 MG/1
30 TABLET, FILM COATED, EXTENDED RELEASE ORAL ONCE
Status: COMPLETED | OUTPATIENT
Start: 2018-01-01 | End: 2018-01-01

## 2018-01-01 RX ORDER — LEVOFLOXACIN 750 MG/1
750 TABLET, FILM COATED ORAL DAILY
Qty: 5 TAB | Refills: 0 | Status: ON HOLD | OUTPATIENT
Start: 2018-01-01 | End: 2018-01-01

## 2018-01-01 RX ORDER — ONDANSETRON 4 MG/1
4 TABLET, ORALLY DISINTEGRATING ORAL EVERY 4 HOURS PRN
Status: CANCELLED | OUTPATIENT
Start: 2018-01-01

## 2018-01-01 RX ORDER — MORPHINE SULFATE 15 MG/1
TABLET, FILM COATED, EXTENDED RELEASE ORAL
Status: ACTIVE
Start: 2018-01-01 | End: 2018-01-01

## 2018-01-01 RX ORDER — MORPHINE SULFATE 30 MG/1
30 TABLET, FILM COATED, EXTENDED RELEASE ORAL EVERY 12 HOURS
Status: DISCONTINUED | OUTPATIENT
Start: 2018-01-01 | End: 2018-01-01

## 2018-01-01 RX ORDER — AMOXICILLIN 250 MG
CAPSULE ORAL
Status: ACTIVE
Start: 2018-01-01 | End: 2018-01-01

## 2018-01-01 RX ORDER — FUROSEMIDE 10 MG/ML
40 INJECTION INTRAMUSCULAR; INTRAVENOUS
Status: DISCONTINUED | OUTPATIENT
Start: 2018-01-01 | End: 2018-01-01

## 2018-01-01 RX ORDER — AZITHROMYCIN 250 MG/1
250 TABLET, FILM COATED ORAL DAILY
Status: DISCONTINUED | OUTPATIENT
Start: 2018-01-01 | End: 2018-01-01

## 2018-01-01 RX ORDER — AMOXICILLIN 250 MG
2 CAPSULE ORAL 2 TIMES DAILY
Status: DISCONTINUED | OUTPATIENT
Start: 2018-01-01 | End: 2018-01-01

## 2018-01-01 RX ORDER — MORPHINE SULFATE 4 MG/ML
4 INJECTION, SOLUTION INTRAMUSCULAR; INTRAVENOUS EVERY 4 HOURS PRN
Status: DISCONTINUED | OUTPATIENT
Start: 2018-01-01 | End: 2018-01-01

## 2018-01-01 RX ORDER — MORPHINE SULFATE 15 MG/1
7.5 TABLET ORAL
Status: DISCONTINUED | OUTPATIENT
Start: 2018-01-01 | End: 2018-01-01 | Stop reason: HOSPADM

## 2018-01-01 RX ORDER — MIDAZOLAM HYDROCHLORIDE 1 MG/ML
INJECTION INTRAMUSCULAR; INTRAVENOUS
Status: COMPLETED
Start: 2018-01-01 | End: 2018-01-01

## 2018-01-01 RX ORDER — OXYCODONE HYDROCHLORIDE 5 MG/1
15 TABLET ORAL
Status: DISCONTINUED | OUTPATIENT
Start: 2018-01-01 | End: 2018-01-01 | Stop reason: HOSPADM

## 2018-01-01 RX ORDER — HYDROCODONE BITARTRATE AND ACETAMINOPHEN 5; 325 MG/1; MG/1
1-2 TABLET ORAL EVERY 4 HOURS PRN
Status: ON HOLD | COMMUNITY
End: 2018-01-01

## 2018-01-01 RX ORDER — ONDANSETRON 4 MG/1
4 TABLET, ORALLY DISINTEGRATING ORAL EVERY 6 HOURS PRN
Qty: 60 TAB | Refills: 1 | Status: SHIPPED | OUTPATIENT
Start: 2018-01-01

## 2018-01-01 RX ORDER — POTASSIUM CHLORIDE 20 MEQ/1
20 TABLET, EXTENDED RELEASE ORAL 2 TIMES DAILY
Status: DISCONTINUED | OUTPATIENT
Start: 2018-01-01 | End: 2018-01-01

## 2018-01-01 RX ORDER — MAGNESIUM SULFATE 1 G/100ML
1 INJECTION INTRAVENOUS ONCE
Status: COMPLETED | OUTPATIENT
Start: 2018-01-01 | End: 2018-01-01

## 2018-01-01 RX ORDER — LEVOFLOXACIN 750 MG/1
750 TABLET, FILM COATED ORAL DAILY
Status: DISCONTINUED | OUTPATIENT
Start: 2018-01-01 | End: 2018-01-01 | Stop reason: HOSPADM

## 2018-01-01 RX ORDER — FUROSEMIDE 10 MG/ML
20 INJECTION INTRAMUSCULAR; INTRAVENOUS
Status: DISCONTINUED | OUTPATIENT
Start: 2018-01-01 | End: 2018-01-01 | Stop reason: HOSPADM

## 2018-01-01 RX ORDER — AZITHROMYCIN 250 MG/1
250 TABLET, FILM COATED ORAL DAILY
Status: COMPLETED | OUTPATIENT
Start: 2018-01-01 | End: 2018-01-01

## 2018-01-01 RX ORDER — SODIUM CHLORIDE 9 MG/ML
500 INJECTION, SOLUTION INTRAVENOUS
Status: DISCONTINUED | OUTPATIENT
Start: 2018-01-01 | End: 2018-01-01 | Stop reason: HOSPADM

## 2018-01-01 RX ORDER — ONDANSETRON 2 MG/ML
4 INJECTION INTRAMUSCULAR; INTRAVENOUS PRN
Status: ACTIVE | OUTPATIENT
Start: 2018-01-01 | End: 2018-01-01

## 2018-01-01 RX ORDER — BUPROPION HYDROCHLORIDE 75 MG/1
75 TABLET ORAL 2 TIMES DAILY
Status: DISCONTINUED | OUTPATIENT
Start: 2018-01-01 | End: 2018-01-01 | Stop reason: HOSPADM

## 2018-01-01 RX ORDER — HYDROMORPHONE HYDROCHLORIDE 4 MG/1
4 TABLET ORAL EVERY 4 HOURS PRN
Status: DISCONTINUED | OUTPATIENT
Start: 2018-01-01 | End: 2018-01-01

## 2018-01-01 RX ORDER — OXYCODONE HYDROCHLORIDE 10 MG/1
10 TABLET ORAL
Status: DISCONTINUED | OUTPATIENT
Start: 2018-01-01 | End: 2018-01-01

## 2018-01-01 RX ORDER — SODIUM CHLORIDE 9 MG/ML
INJECTION, SOLUTION INTRAVENOUS CONTINUOUS
Status: ACTIVE | OUTPATIENT
Start: 2018-01-01 | End: 2018-01-01

## 2018-01-01 RX ORDER — OXYCODONE HYDROCHLORIDE 10 MG/1
10 TABLET ORAL ONCE
Status: COMPLETED | OUTPATIENT
Start: 2018-01-01 | End: 2018-01-01

## 2018-01-01 RX ORDER — SODIUM CHLORIDE 9 MG/ML
500 INJECTION, SOLUTION INTRAVENOUS
Status: ACTIVE | OUTPATIENT
Start: 2018-01-01 | End: 2018-01-01

## 2018-01-01 RX ORDER — ONDANSETRON 4 MG/1
4 TABLET, ORALLY DISINTEGRATING ORAL EVERY 6 HOURS PRN
Qty: 30 TAB | Refills: 1 | Status: ON HOLD | OUTPATIENT
Start: 2018-01-01 | End: 2018-01-01

## 2018-01-01 RX ORDER — OXYCODONE HYDROCHLORIDE 5 MG/1
5 TABLET ORAL
Status: DISCONTINUED | OUTPATIENT
Start: 2018-01-01 | End: 2018-01-01

## 2018-01-01 RX ORDER — LIDOCAINE HYDROCHLORIDE 10 MG/ML
INJECTION, SOLUTION INFILTRATION; PERINEURAL
Status: COMPLETED
Start: 2018-01-01 | End: 2018-01-01

## 2018-01-01 RX ORDER — OXYCODONE HYDROCHLORIDE 10 MG/1
10 TABLET ORAL 2 TIMES DAILY PRN
Status: DISCONTINUED | OUTPATIENT
Start: 2018-01-01 | End: 2018-01-01

## 2018-01-01 RX ORDER — ONDANSETRON 2 MG/ML
INJECTION INTRAMUSCULAR; INTRAVENOUS
Status: COMPLETED
Start: 2018-01-01 | End: 2018-01-01

## 2018-01-01 RX ORDER — SODIUM CHLORIDE 9 MG/ML
INJECTION, SOLUTION INTRAVENOUS CONTINUOUS
Status: DISCONTINUED | OUTPATIENT
Start: 2018-01-01 | End: 2018-01-01 | Stop reason: HOSPADM

## 2018-01-01 RX ORDER — BUPROPION HYDROCHLORIDE 75 MG/1
TABLET ORAL
Status: COMPLETED
Start: 2018-01-01 | End: 2018-01-01

## 2018-01-01 RX ORDER — GABAPENTIN 300 MG/1
300 CAPSULE ORAL 3 TIMES DAILY
COMMUNITY

## 2018-01-01 RX ORDER — MORPHINE SULFATE 30 MG/1
30 TABLET, FILM COATED, EXTENDED RELEASE ORAL EVERY 12 HOURS
Status: DISCONTINUED | OUTPATIENT
Start: 2018-01-01 | End: 2018-01-01 | Stop reason: HOSPADM

## 2018-01-01 RX ORDER — CEFDINIR 300 MG/1
300 CAPSULE ORAL EVERY 12 HOURS
Status: DISCONTINUED | OUTPATIENT
Start: 2018-01-01 | End: 2018-01-01

## 2018-01-01 RX ORDER — OXYCODONE HYDROCHLORIDE 5 MG/1
5 TABLET ORAL
Status: DISCONTINUED | OUTPATIENT
Start: 2018-01-01 | End: 2018-01-01 | Stop reason: HOSPADM

## 2018-01-01 RX ORDER — DIPHENHYDRAMINE HYDROCHLORIDE 50 MG/ML
25 INJECTION INTRAMUSCULAR; INTRAVENOUS ONCE
Status: COMPLETED | OUTPATIENT
Start: 2018-01-01 | End: 2018-01-01

## 2018-01-01 RX ORDER — MORPHINE SULFATE 30 MG/1
30 TABLET, FILM COATED, EXTENDED RELEASE ORAL EVERY 12 HOURS
Status: ON HOLD | COMMUNITY
End: 2018-01-01

## 2018-01-01 RX ORDER — POTASSIUM CHLORIDE 20 MEQ/1
20 TABLET, EXTENDED RELEASE ORAL DAILY
Qty: 30 TAB | Refills: 3 | Status: ON HOLD | OUTPATIENT
Start: 2018-01-01 | End: 2018-01-01

## 2018-01-01 RX ORDER — GABAPENTIN 300 MG/1
300 CAPSULE ORAL 3 TIMES DAILY
Status: DISCONTINUED | OUTPATIENT
Start: 2018-01-01 | End: 2018-01-01 | Stop reason: HOSPADM

## 2018-01-01 RX ORDER — POLYETHYLENE GLYCOL 3350 17 G/17G
1 POWDER, FOR SOLUTION ORAL
Status: DISCONTINUED | OUTPATIENT
Start: 2018-01-01 | End: 2018-01-01

## 2018-01-01 RX ORDER — BUPROPION HYDROCHLORIDE 75 MG/1
75 TABLET ORAL 2 TIMES DAILY
Status: CANCELLED | OUTPATIENT
Start: 2018-01-01

## 2018-01-01 RX ORDER — MORPHINE SULFATE 15 MG/1
7.5 TABLET ORAL EVERY 6 HOURS PRN
Qty: 15 TAB | Refills: 0 | Status: SHIPPED | OUTPATIENT
Start: 2018-01-01 | End: 2018-01-01

## 2018-01-01 RX ORDER — OXYCODONE HYDROCHLORIDE 10 MG/1
10 TABLET ORAL 2 TIMES DAILY PRN
Status: CANCELLED | OUTPATIENT
Start: 2018-01-01

## 2018-01-01 RX ORDER — GABAPENTIN 300 MG/1
300 CAPSULE ORAL 3 TIMES DAILY
COMMUNITY
End: 2018-01-01

## 2018-01-01 RX ORDER — ONDANSETRON 4 MG/1
4 TABLET, ORALLY DISINTEGRATING ORAL EVERY 6 HOURS PRN
Status: DISCONTINUED | OUTPATIENT
Start: 2018-01-01 | End: 2018-01-01

## 2018-01-01 RX ORDER — BUPROPION HYDROCHLORIDE 75 MG/1
150 TABLET ORAL 2 TIMES DAILY
Status: ON HOLD | COMMUNITY
End: 2018-01-01 | Stop reason: DRUGHIGH

## 2018-01-01 RX ORDER — AMOXICILLIN 250 MG
2 CAPSULE ORAL 2 TIMES DAILY
Status: CANCELLED | OUTPATIENT
Start: 2018-01-01

## 2018-01-01 RX ORDER — MORPHINE SULFATE 30 MG/1
TABLET, FILM COATED, EXTENDED RELEASE ORAL
Status: ACTIVE
Start: 2018-01-01 | End: 2018-01-01

## 2018-01-01 RX ORDER — ACETAMINOPHEN 325 MG/1
650 TABLET ORAL EVERY 6 HOURS PRN
Status: DISCONTINUED | OUTPATIENT
Start: 2018-01-01 | End: 2018-01-01 | Stop reason: HOSPADM

## 2018-01-01 RX ORDER — MORPHINE SULFATE 4 MG/ML
4 INJECTION, SOLUTION INTRAMUSCULAR; INTRAVENOUS EVERY 4 HOURS PRN
Status: DISCONTINUED | OUTPATIENT
Start: 2018-01-01 | End: 2018-01-01 | Stop reason: HOSPADM

## 2018-01-01 RX ORDER — SODIUM CHLORIDE 9 MG/ML
INJECTION, SOLUTION INTRAVENOUS CONTINUOUS
Status: CANCELLED | OUTPATIENT
Start: 2018-01-01 | End: 2018-01-01

## 2018-01-01 RX ORDER — MORPHINE SULFATE 60 MG/1
60 TABLET, FILM COATED, EXTENDED RELEASE ORAL EVERY 12 HOURS
Status: DISCONTINUED | OUTPATIENT
Start: 2018-01-01 | End: 2018-01-01 | Stop reason: HOSPADM

## 2018-01-01 RX ORDER — HYDROMORPHONE HYDROCHLORIDE 2 MG/1
2 TABLET ORAL EVERY 4 HOURS PRN
Status: DISCONTINUED | OUTPATIENT
Start: 2018-01-01 | End: 2018-01-01

## 2018-01-01 RX ORDER — OXYCODONE HYDROCHLORIDE 5 MG/1
10 TABLET ORAL EVERY 4 HOURS PRN
Status: DISCONTINUED | OUTPATIENT
Start: 2018-01-01 | End: 2018-01-01 | Stop reason: HOSPADM

## 2018-01-01 RX ORDER — PROMETHAZINE HYDROCHLORIDE 25 MG/1
25 TABLET ORAL EVERY 6 HOURS PRN
Status: DISCONTINUED | OUTPATIENT
Start: 2018-01-01 | End: 2018-01-01

## 2018-01-01 RX ORDER — SIMETHICONE 40MG/0.6ML
40 SUSPENSION, DROPS(FINAL DOSAGE FORM)(ML) ORAL EVERY 6 HOURS PRN
Status: DISCONTINUED | OUTPATIENT
Start: 2018-01-01 | End: 2018-01-01 | Stop reason: HOSPADM

## 2018-01-01 RX ORDER — AZITHROMYCIN 250 MG/1
500 TABLET, FILM COATED ORAL ONCE
Status: COMPLETED | OUTPATIENT
Start: 2018-01-01 | End: 2018-01-01

## 2018-01-01 RX ORDER — OXYCODONE HYDROCHLORIDE 10 MG/1
10 TABLET ORAL EVERY 6 HOURS PRN
Qty: 30 TAB | Refills: 0 | Status: SHIPPED | OUTPATIENT
Start: 2018-01-01 | End: 2018-01-01

## 2018-01-01 RX ORDER — BISACODYL 10 MG
10 SUPPOSITORY, RECTAL RECTAL
Status: DISCONTINUED | OUTPATIENT
Start: 2018-01-01 | End: 2018-01-01

## 2018-01-01 RX ORDER — ALBUMIN, HUMAN INJ 5% 5 %
12.5 SOLUTION INTRAVENOUS ONCE
Status: COMPLETED | OUTPATIENT
Start: 2018-01-01 | End: 2018-01-01

## 2018-01-01 RX ORDER — LEVOFLOXACIN 750 MG/1
750 TABLET, FILM COATED ORAL DAILY
Qty: 10 TAB | Refills: 0 | Status: SHIPPED | OUTPATIENT
Start: 2018-01-01 | End: 2018-01-01

## 2018-01-01 RX ORDER — MORPHINE SULFATE 30 MG/1
30 TABLET, FILM COATED, EXTENDED RELEASE ORAL EVERY 12 HOURS
Qty: 60 TAB | Refills: 0 | Status: SHIPPED | OUTPATIENT
Start: 2018-01-01 | End: 2018-01-01

## 2018-01-01 RX ORDER — FUROSEMIDE 20 MG/1
TABLET ORAL
Status: COMPLETED
Start: 2018-01-01 | End: 2018-01-01

## 2018-01-01 RX ORDER — POTASSIUM CHLORIDE 7.45 MG/ML
10 INJECTION INTRAVENOUS ONCE
Status: COMPLETED | OUTPATIENT
Start: 2018-01-01 | End: 2018-01-01

## 2018-01-01 RX ORDER — SENNOSIDES A AND B 8.6 MG/1
TABLET, FILM COATED ORAL
Status: ACTIVE
Start: 2018-01-01 | End: 2018-01-01

## 2018-01-01 RX ORDER — SUCRALFATE 1 G/1
1 TABLET ORAL
COMMUNITY
End: 2018-01-01

## 2018-01-01 RX ORDER — FUROSEMIDE 40 MG/1
20 TABLET ORAL DAILY
Qty: 30 TAB | Refills: 3 | Status: ON HOLD | OUTPATIENT
Start: 2018-01-01 | End: 2018-01-01

## 2018-01-01 RX ORDER — METOCLOPRAMIDE HYDROCHLORIDE 5 MG/ML
5 INJECTION INTRAMUSCULAR; INTRAVENOUS ONCE
Status: COMPLETED | OUTPATIENT
Start: 2018-01-01 | End: 2018-01-01

## 2018-01-01 RX ORDER — IBUPROFEN 400 MG/1
400 TABLET ORAL EVERY 6 HOURS PRN
Status: DISCONTINUED | OUTPATIENT
Start: 2018-01-01 | End: 2018-01-01 | Stop reason: HOSPADM

## 2018-01-01 RX ORDER — DOCUSATE SODIUM 100 MG/1
100 CAPSULE, LIQUID FILLED ORAL 2 TIMES DAILY PRN
Status: ON HOLD | COMMUNITY
End: 2018-01-01

## 2018-01-01 RX ORDER — LIDOCAINE HYDROCHLORIDE AND EPINEPHRINE BITARTRATE 20; .01 MG/ML; MG/ML
INJECTION, SOLUTION SUBCUTANEOUS
Status: COMPLETED
Start: 2018-01-01 | End: 2018-01-01

## 2018-01-01 RX ORDER — ONDANSETRON 2 MG/ML
4 INJECTION INTRAMUSCULAR; INTRAVENOUS EVERY 4 HOURS PRN
Status: CANCELLED | OUTPATIENT
Start: 2018-01-01

## 2018-01-01 RX ORDER — MORPHINE SULFATE 60 MG/1
60 TABLET, FILM COATED, EXTENDED RELEASE ORAL EVERY 12 HOURS
Qty: 14 TAB | Refills: 0 | Status: SHIPPED | OUTPATIENT
Start: 2018-01-01 | End: 2018-01-01

## 2018-01-01 RX ORDER — ONDANSETRON 2 MG/ML
4 INJECTION INTRAMUSCULAR; INTRAVENOUS ONCE
Status: COMPLETED | OUTPATIENT
Start: 2018-01-01 | End: 2018-01-01

## 2018-01-01 RX ORDER — POTASSIUM CHLORIDE 20 MEQ/1
40 TABLET, EXTENDED RELEASE ORAL ONCE
Status: COMPLETED | OUTPATIENT
Start: 2018-01-01 | End: 2018-01-01

## 2018-01-01 RX ORDER — SODIUM CHLORIDE 9 MG/ML
500 INJECTION, SOLUTION INTRAVENOUS PRN
Status: DISCONTINUED | OUTPATIENT
Start: 2018-01-01 | End: 2018-01-01 | Stop reason: HOSPADM

## 2018-01-01 RX ORDER — GABAPENTIN 300 MG/1
CAPSULE ORAL
Status: COMPLETED
Start: 2018-01-01 | End: 2018-01-01

## 2018-01-01 RX ORDER — MORPHINE SULFATE 4 MG/ML
4 INJECTION, SOLUTION INTRAMUSCULAR; INTRAVENOUS
Status: CANCELLED | OUTPATIENT
Start: 2018-01-01

## 2018-01-01 RX ORDER — BUPROPION HYDROCHLORIDE 150 MG/1
150 TABLET, EXTENDED RELEASE ORAL 2 TIMES DAILY
COMMUNITY
End: 2018-01-01

## 2018-01-01 RX ORDER — CEFAZOLIN SODIUM 1 G/3ML
INJECTION, POWDER, FOR SOLUTION INTRAMUSCULAR; INTRAVENOUS
Status: COMPLETED
Start: 2018-01-01 | End: 2018-01-01

## 2018-01-01 RX ORDER — ACYCLOVIR 200 MG/1
200 CAPSULE ORAL 2 TIMES DAILY
Status: CANCELLED | OUTPATIENT
Start: 2018-01-01

## 2018-01-01 RX ORDER — CEFTRIAXONE 1 G/1
1 INJECTION, POWDER, FOR SOLUTION INTRAMUSCULAR; INTRAVENOUS ONCE
Status: COMPLETED | OUTPATIENT
Start: 2018-01-01 | End: 2018-01-01

## 2018-01-01 RX ORDER — FUROSEMIDE 10 MG/ML
20 INJECTION INTRAMUSCULAR; INTRAVENOUS
Status: DISCONTINUED | OUTPATIENT
Start: 2018-01-01 | End: 2018-01-01

## 2018-01-01 RX ORDER — OXYCODONE HYDROCHLORIDE 10 MG/1
10 TABLET ORAL 2 TIMES DAILY PRN
Status: ON HOLD | COMMUNITY
End: 2018-01-01

## 2018-01-01 RX ORDER — LEVOFLOXACIN 750 MG/1
750 TABLET, FILM COATED ORAL DAILY
Status: DISCONTINUED | OUTPATIENT
Start: 2018-01-01 | End: 2018-01-01

## 2018-01-01 RX ORDER — MORPHINE SULFATE 15 MG/1
7.5-15 TABLET ORAL EVERY 4 HOURS PRN
Status: DISCONTINUED | OUTPATIENT
Start: 2018-01-01 | End: 2018-01-01 | Stop reason: HOSPADM

## 2018-01-01 RX ORDER — ACYCLOVIR 200 MG/1
400 CAPSULE ORAL 2 TIMES DAILY
Status: ON HOLD | COMMUNITY
End: 2018-01-01 | Stop reason: DRUGHIGH

## 2018-01-01 RX ORDER — NALOXONE HYDROCHLORIDE 0.4 MG/ML
INJECTION, SOLUTION INTRAMUSCULAR; INTRAVENOUS; SUBCUTANEOUS
Status: COMPLETED
Start: 2018-01-01 | End: 2018-01-01

## 2018-01-01 RX ORDER — ENALAPRILAT 1.25 MG/ML
1.25 INJECTION INTRAVENOUS EVERY 6 HOURS PRN
Status: CANCELLED | OUTPATIENT
Start: 2018-01-01

## 2018-01-01 RX ORDER — LEVOFLOXACIN 750 MG/1
750 TABLET, FILM COATED ORAL DAILY
Qty: 5 TAB | Refills: 0 | Status: SHIPPED | OUTPATIENT
Start: 2018-01-01 | End: 2018-01-01

## 2018-01-01 RX ORDER — MORPHINE SULFATE 4 MG/ML
4 INJECTION, SOLUTION INTRAMUSCULAR; INTRAVENOUS
Status: COMPLETED | OUTPATIENT
Start: 2018-01-01 | End: 2018-01-01

## 2018-01-01 RX ORDER — OXYCODONE HYDROCHLORIDE 10 MG/1
10 TABLET ORAL EVERY 6 HOURS PRN
Status: ON HOLD | COMMUNITY
End: 2018-01-01

## 2018-01-01 RX ORDER — MORPHINE SULFATE 15 MG/1
7.5-15 TABLET ORAL EVERY 4 HOURS PRN
Qty: 60 TAB | Refills: 0 | Status: SHIPPED | OUTPATIENT
Start: 2018-01-01 | End: 2018-01-01

## 2018-01-01 RX ORDER — DOCUSATE SODIUM 100 MG/1
100 CAPSULE, LIQUID FILLED ORAL 2 TIMES DAILY PRN
Status: DISCONTINUED | OUTPATIENT
Start: 2018-01-01 | End: 2018-01-01

## 2018-01-01 RX ORDER — POLYETHYLENE GLYCOL 3350 17 G/17G
1 POWDER, FOR SOLUTION ORAL DAILY
Status: DISCONTINUED | OUTPATIENT
Start: 2018-01-01 | End: 2018-01-01 | Stop reason: HOSPADM

## 2018-01-01 RX ADMIN — SENNOSIDES AND DOCUSATE SODIUM 2 TABLET: 8.6; 5 TABLET ORAL at 05:04

## 2018-01-01 RX ADMIN — NICOTINE 21 MG: 21 PATCH, EXTENDED RELEASE TRANSDERMAL at 05:58

## 2018-01-01 RX ADMIN — MORPHINE SULFATE 20 MG: 100 SOLUTION ORAL at 14:15

## 2018-01-01 RX ADMIN — GABAPENTIN 300 MG: 300 CAPSULE ORAL at 01:03

## 2018-01-01 RX ADMIN — ACYCLOVIR 200 MG: 200 CAPSULE ORAL at 20:09

## 2018-01-01 RX ADMIN — HYDROMORPHONE HYDROCHLORIDE 0.25 MG: 10 INJECTION, SOLUTION INTRAMUSCULAR; INTRAVENOUS; SUBCUTANEOUS at 18:32

## 2018-01-01 RX ADMIN — SENNOSIDES AND DOCUSATE SODIUM 2 TABLET: 8.6; 5 TABLET ORAL at 09:41

## 2018-01-01 RX ADMIN — HYDROMORPHONE HYDROCHLORIDE 1 MG: 10 INJECTION, SOLUTION INTRAMUSCULAR; INTRAVENOUS; SUBCUTANEOUS at 01:51

## 2018-01-01 RX ADMIN — MORPHINE SULFATE 7.5 MG: 15 TABLET ORAL at 21:45

## 2018-01-01 RX ADMIN — MORPHINE SULFATE 30 MG: 30 TABLET, EXTENDED RELEASE ORAL at 07:50

## 2018-01-01 RX ADMIN — BUPROPION HYDROCHLORIDE 75 MG: 75 TABLET, FILM COATED ORAL at 05:48

## 2018-01-01 RX ADMIN — MORPHINE SULFATE 30 MG: 30 TABLET, EXTENDED RELEASE ORAL at 08:02

## 2018-01-01 RX ADMIN — ACYCLOVIR 200 MG: 200 CAPSULE ORAL at 11:09

## 2018-01-01 RX ADMIN — GABAPENTIN 300 MG: 300 CAPSULE ORAL at 09:01

## 2018-01-01 RX ADMIN — OXYCODONE HYDROCHLORIDE 10 MG: 10 TABLET ORAL at 21:06

## 2018-01-01 RX ADMIN — LIDOCAINE HYDROCHLORIDE: 10 INJECTION, SOLUTION INFILTRATION; PERINEURAL at 15:10

## 2018-01-01 RX ADMIN — GABAPENTIN 300 MG: 300 CAPSULE ORAL at 15:30

## 2018-01-01 RX ADMIN — MORPHINE SULFATE 30 MG: 30 TABLET, EXTENDED RELEASE ORAL at 21:09

## 2018-01-01 RX ADMIN — GABAPENTIN 300 MG: 300 CAPSULE ORAL at 18:18

## 2018-01-01 RX ADMIN — HYDROMORPHONE HYDROCHLORIDE 0.25 MG: 10 INJECTION, SOLUTION INTRAMUSCULAR; INTRAVENOUS; SUBCUTANEOUS at 05:46

## 2018-01-01 RX ADMIN — OXYCODONE HYDROCHLORIDE 5 MG: 5 TABLET ORAL at 21:25

## 2018-01-01 RX ADMIN — SENNOSIDES AND DOCUSATE SODIUM 2 TABLET: 8.6; 5 TABLET ORAL at 21:23

## 2018-01-01 RX ADMIN — DIPHENHYDRAMINE HYDROCHLORIDE 25 MG: 50 INJECTION, SOLUTION INTRAMUSCULAR; INTRAVENOUS at 12:44

## 2018-01-01 RX ADMIN — OXYCODONE HYDROCHLORIDE 5 MG: 5 TABLET ORAL at 19:14

## 2018-01-01 RX ADMIN — HEPARIN SODIUM 5000 UNITS: 5000 INJECTION, SOLUTION INTRAVENOUS; SUBCUTANEOUS at 20:40

## 2018-01-01 RX ADMIN — ONDANSETRON HYDROCHLORIDE 4 MG: 2 INJECTION, SOLUTION INTRAMUSCULAR; INTRAVENOUS at 15:35

## 2018-01-01 RX ADMIN — BUPROPION HYDROCHLORIDE 75 MG: 75 TABLET, FILM COATED ORAL at 06:15

## 2018-01-01 RX ADMIN — HEPARIN SODIUM 5000 UNITS: 5000 INJECTION, SOLUTION INTRAVENOUS; SUBCUTANEOUS at 05:38

## 2018-01-01 RX ADMIN — FUROSEMIDE 40 MG: 10 INJECTION, SOLUTION INTRAMUSCULAR; INTRAVENOUS at 11:10

## 2018-01-01 RX ADMIN — BUPROPION HYDROCHLORIDE 75 MG: 75 TABLET, FILM COATED ORAL at 00:16

## 2018-01-01 RX ADMIN — METOCLOPRAMIDE 10 MG: 5 INJECTION, SOLUTION INTRAMUSCULAR; INTRAVENOUS at 14:21

## 2018-01-01 RX ADMIN — GABAPENTIN 300 MG: 300 CAPSULE ORAL at 05:48

## 2018-01-01 RX ADMIN — BUPROPION HYDROCHLORIDE 75 MG: 75 TABLET, FILM COATED ORAL at 18:15

## 2018-01-01 RX ADMIN — ONDANSETRON 4 MG: 2 INJECTION, SOLUTION INTRAMUSCULAR; INTRAVENOUS at 20:05

## 2018-01-01 RX ADMIN — MORPHINE SULFATE 60 MG: 60 TABLET, FILM COATED, EXTENDED RELEASE ORAL at 05:49

## 2018-01-01 RX ADMIN — METOCLOPRAMIDE 10 MG: 5 INJECTION, SOLUTION INTRAMUSCULAR; INTRAVENOUS at 03:02

## 2018-01-01 RX ADMIN — ACYCLOVIR 200 MG: 200 CAPSULE ORAL at 18:08

## 2018-01-01 RX ADMIN — OXYCODONE HYDROCHLORIDE 5 MG: 5 TABLET ORAL at 20:51

## 2018-01-01 RX ADMIN — VANCOMYCIN HYDROCHLORIDE 1700 MG: 100 INJECTION, POWDER, LYOPHILIZED, FOR SOLUTION INTRAVENOUS at 21:39

## 2018-01-01 RX ADMIN — STANDARDIZED SENNA CONCENTRATE AND DOCUSATE SODIUM 2 TABLET: 8.6; 5 TABLET, FILM COATED ORAL at 08:28

## 2018-01-01 RX ADMIN — MORPHINE SULFATE 30 MG: 30 TABLET, EXTENDED RELEASE ORAL at 19:43

## 2018-01-01 RX ADMIN — BUPROPION HYDROCHLORIDE 150 MG: 150 TABLET, EXTENDED RELEASE ORAL at 21:06

## 2018-01-01 RX ADMIN — GABAPENTIN 300 MG: 300 CAPSULE ORAL at 08:44

## 2018-01-01 RX ADMIN — HYDROMORPHONE HYDROCHLORIDE 1 MG: 10 INJECTION, SOLUTION INTRAMUSCULAR; INTRAVENOUS; SUBCUTANEOUS at 21:13

## 2018-01-01 RX ADMIN — GABAPENTIN 300 MG: 300 CAPSULE ORAL at 09:07

## 2018-01-01 RX ADMIN — SENNOSIDES AND DOCUSATE SODIUM 2 TABLET: 8.6; 5 TABLET ORAL at 21:10

## 2018-01-01 RX ADMIN — PIPERACILLIN SODIUM AND TAZOBACTAM SODIUM 3.38 G: 3; .375 INJECTION, POWDER, FOR SOLUTION INTRAVENOUS at 17:03

## 2018-01-01 RX ADMIN — FUROSEMIDE 20 MG: 10 INJECTION, SOLUTION INTRAMUSCULAR; INTRAVENOUS at 05:38

## 2018-01-01 RX ADMIN — STANDARDIZED SENNA CONCENTRATE AND DOCUSATE SODIUM 2 TABLET: 8.6; 5 TABLET, FILM COATED ORAL at 05:36

## 2018-01-01 RX ADMIN — MORPHINE SULFATE 30 MG: 30 TABLET, EXTENDED RELEASE ORAL at 22:36

## 2018-01-01 RX ADMIN — MORPHINE SULFATE 7.5 MG: 15 TABLET ORAL at 10:13

## 2018-01-01 RX ADMIN — FENTANYL CITRATE 25 MCG: 50 INJECTION, SOLUTION INTRAMUSCULAR; INTRAVENOUS at 08:53

## 2018-01-01 RX ADMIN — GABAPENTIN 300 MG: 300 CAPSULE ORAL at 17:14

## 2018-01-01 RX ADMIN — SENNOSIDES AND DOCUSATE SODIUM 2 TABLET: 8.6; 5 TABLET ORAL at 17:14

## 2018-01-01 RX ADMIN — LEVOFLOXACIN 750 MG: 750 TABLET, FILM COATED ORAL at 08:16

## 2018-01-01 RX ADMIN — ENOXAPARIN SODIUM 40 MG: 100 INJECTION SUBCUTANEOUS at 09:07

## 2018-01-01 RX ADMIN — POTASSIUM CHLORIDE 40 MEQ: 1500 TABLET, EXTENDED RELEASE ORAL at 20:16

## 2018-01-01 RX ADMIN — ACYCLOVIR 200 MG: 200 CAPSULE ORAL at 21:07

## 2018-01-01 RX ADMIN — BUPROPION HYDROCHLORIDE 75 MG: 75 TABLET, FILM COATED ORAL at 05:34

## 2018-01-01 RX ADMIN — PROCHLORPERAZINE EDISYLATE 10 MG: 5 INJECTION INTRAMUSCULAR; INTRAVENOUS at 21:10

## 2018-01-01 RX ADMIN — MORPHINE SULFATE 30 MG: 30 TABLET, EXTENDED RELEASE ORAL at 21:12

## 2018-01-01 RX ADMIN — BUPROPION HYDROCHLORIDE 75 MG: 75 TABLET, FILM COATED ORAL at 06:33

## 2018-01-01 RX ADMIN — OXYCODONE HYDROCHLORIDE 10 MG: 10 TABLET ORAL at 12:39

## 2018-01-01 RX ADMIN — BUPROPION HYDROCHLORIDE 75 MG: 75 TABLET, FILM COATED ORAL at 16:09

## 2018-01-01 RX ADMIN — BUPROPION HYDROCHLORIDE 150 MG: 150 TABLET, EXTENDED RELEASE ORAL at 08:17

## 2018-01-01 RX ADMIN — BUPROPION HYDROCHLORIDE 75 MG: 75 TABLET, FILM COATED ORAL at 18:38

## 2018-01-01 RX ADMIN — GABAPENTIN 300 MG: 300 CAPSULE ORAL at 16:32

## 2018-01-01 RX ADMIN — BUPROPION HYDROCHLORIDE 150 MG: 150 TABLET, EXTENDED RELEASE ORAL at 07:40

## 2018-01-01 RX ADMIN — SENNOSIDES AND DOCUSATE SODIUM 2 TABLET: 8.6; 5 TABLET ORAL at 08:16

## 2018-01-01 RX ADMIN — GABAPENTIN 300 MG: 300 CAPSULE ORAL at 21:07

## 2018-01-01 RX ADMIN — ENOXAPARIN SODIUM 40 MG: 100 INJECTION SUBCUTANEOUS at 07:51

## 2018-01-01 RX ADMIN — BUPROPION HYDROCHLORIDE 75 MG: 75 TABLET, FILM COATED ORAL at 05:36

## 2018-01-01 RX ADMIN — MORPHINE SULFATE 45 MG: 15 TABLET, EXTENDED RELEASE ORAL at 08:28

## 2018-01-01 RX ADMIN — GABAPENTIN 300 MG: 300 CAPSULE ORAL at 07:45

## 2018-01-01 RX ADMIN — BUPROPION HYDROCHLORIDE 75 MG: 75 TABLET, FILM COATED ORAL at 16:35

## 2018-01-01 RX ADMIN — HEPARIN SODIUM 5000 UNITS: 5000 INJECTION, SOLUTION INTRAVENOUS; SUBCUTANEOUS at 05:22

## 2018-01-01 RX ADMIN — OXYCODONE HYDROCHLORIDE 5 MG: 5 TABLET ORAL at 04:15

## 2018-01-01 RX ADMIN — MORPHINE SULFATE 60 MG: 60 TABLET, FILM COATED, EXTENDED RELEASE ORAL at 17:13

## 2018-01-01 RX ADMIN — FENTANYL CITRATE 50 MCG: 50 INJECTION, SOLUTION INTRAMUSCULAR; INTRAVENOUS at 15:10

## 2018-01-01 RX ADMIN — GABAPENTIN 300 MG: 300 CAPSULE ORAL at 16:42

## 2018-01-01 RX ADMIN — OXYCODONE HYDROCHLORIDE 5 MG: 5 TABLET ORAL at 06:15

## 2018-01-01 RX ADMIN — OXYCODONE HYDROCHLORIDE 5 MG: 5 TABLET ORAL at 11:14

## 2018-01-01 RX ADMIN — SODIUM CHLORIDE: 9 INJECTION, SOLUTION INTRAVENOUS at 21:24

## 2018-01-01 RX ADMIN — METOCLOPRAMIDE 10 MG: 5 INJECTION, SOLUTION INTRAMUSCULAR; INTRAVENOUS at 15:31

## 2018-01-01 RX ADMIN — GABAPENTIN 300 MG: 300 CAPSULE ORAL at 19:43

## 2018-01-01 RX ADMIN — GABAPENTIN 300 MG: 300 CAPSULE ORAL at 05:21

## 2018-01-01 RX ADMIN — HEPARIN SODIUM 5000 UNITS: 5000 INJECTION, SOLUTION INTRAVENOUS; SUBCUTANEOUS at 06:30

## 2018-01-01 RX ADMIN — GABAPENTIN 300 MG: 300 CAPSULE ORAL at 16:49

## 2018-01-01 RX ADMIN — HYDROMORPHONE HYDROCHLORIDE 1 MG: 10 INJECTION, SOLUTION INTRAMUSCULAR; INTRAVENOUS; SUBCUTANEOUS at 15:09

## 2018-01-01 RX ADMIN — OXYCODONE HYDROCHLORIDE 10 MG: 10 TABLET ORAL at 03:11

## 2018-01-01 RX ADMIN — MORPHINE SULFATE 4 MG: 4 INJECTION INTRAVENOUS at 03:53

## 2018-01-01 RX ADMIN — MIDAZOLAM 2 MG: 1 INJECTION INTRAMUSCULAR; INTRAVENOUS at 08:53

## 2018-01-01 RX ADMIN — SENNOSIDES AND DOCUSATE SODIUM 2 TABLET: 8.6; 5 TABLET ORAL at 20:16

## 2018-01-01 RX ADMIN — BUPROPION HYDROCHLORIDE 75 MG: 75 TABLET, FILM COATED ORAL at 18:09

## 2018-01-01 RX ADMIN — PIPERACILLIN SODIUM AND TAZOBACTAM SODIUM 3.38 G: 3; .375 INJECTION, POWDER, FOR SOLUTION INTRAVENOUS at 04:58

## 2018-01-01 RX ADMIN — SODIUM CHLORIDE 1000 ML: 9 INJECTION, SOLUTION INTRAVENOUS at 18:58

## 2018-01-01 RX ADMIN — GABAPENTIN 300 MG: 300 CAPSULE ORAL at 17:30

## 2018-01-01 RX ADMIN — MORPHINE SULFATE 30 MG: 30 TABLET, EXTENDED RELEASE ORAL at 06:32

## 2018-01-01 RX ADMIN — MIDAZOLAM 1 MG: 1 INJECTION INTRAMUSCULAR; INTRAVENOUS at 09:17

## 2018-01-01 RX ADMIN — SODIUM CHLORIDE: 9 INJECTION, SOLUTION INTRAVENOUS at 09:45

## 2018-01-01 RX ADMIN — GABAPENTIN 300 MG: 300 CAPSULE ORAL at 13:20

## 2018-01-01 RX ADMIN — LEVOFLOXACIN 750 MG: 750 TABLET, FILM COATED ORAL at 05:49

## 2018-01-01 RX ADMIN — OXYCODONE HYDROCHLORIDE 5 MG: 5 TABLET ORAL at 11:09

## 2018-01-01 RX ADMIN — ONDANSETRON 4 MG: 2 INJECTION, SOLUTION INTRAMUSCULAR; INTRAVENOUS at 18:55

## 2018-01-01 RX ADMIN — HEPARIN SODIUM 5000 UNITS: 5000 INJECTION, SOLUTION INTRAVENOUS; SUBCUTANEOUS at 06:08

## 2018-01-01 RX ADMIN — METOCLOPRAMIDE 10 MG: 5 INJECTION, SOLUTION INTRAMUSCULAR; INTRAVENOUS at 20:05

## 2018-01-01 RX ADMIN — MORPHINE SULFATE 4 MG: 4 INJECTION INTRAVENOUS at 15:06

## 2018-01-01 RX ADMIN — METOCLOPRAMIDE 10 MG: 5 INJECTION, SOLUTION INTRAMUSCULAR; INTRAVENOUS at 20:03

## 2018-01-01 RX ADMIN — GABAPENTIN 300 MG: 300 CAPSULE ORAL at 22:32

## 2018-01-01 RX ADMIN — GABAPENTIN 300 MG: 300 CAPSULE ORAL at 08:28

## 2018-01-01 RX ADMIN — ONDANSETRON HYDROCHLORIDE 4 MG: 2 INJECTION, SOLUTION INTRAMUSCULAR; INTRAVENOUS at 20:40

## 2018-01-01 RX ADMIN — HEPARIN SODIUM 5000 UNITS: 5000 INJECTION, SOLUTION INTRAVENOUS; SUBCUTANEOUS at 14:41

## 2018-01-01 RX ADMIN — GABAPENTIN 300 MG: 300 CAPSULE ORAL at 08:29

## 2018-01-01 RX ADMIN — OXYCODONE HYDROCHLORIDE 5 MG: 5 TABLET ORAL at 16:40

## 2018-01-01 RX ADMIN — GABAPENTIN 300 MG: 300 CAPSULE ORAL at 01:18

## 2018-01-01 RX ADMIN — METOCLOPRAMIDE 10 MG: 5 INJECTION, SOLUTION INTRAMUSCULAR; INTRAVENOUS at 04:08

## 2018-01-01 RX ADMIN — BUPROPION HYDROCHLORIDE 150 MG: 150 TABLET, EXTENDED RELEASE ORAL at 20:08

## 2018-01-01 RX ADMIN — HYDROMORPHONE HYDROCHLORIDE 4 MG: 4 TABLET ORAL at 08:28

## 2018-01-01 RX ADMIN — SODIUM CHLORIDE: 9 INJECTION, SOLUTION INTRAVENOUS at 14:46

## 2018-01-01 RX ADMIN — MORPHINE SULFATE 30 MG: 30 TABLET, EXTENDED RELEASE ORAL at 09:42

## 2018-01-01 RX ADMIN — BUPROPION HYDROCHLORIDE 75 MG: 75 TABLET, FILM COATED ORAL at 05:04

## 2018-01-01 RX ADMIN — OXYCODONE HYDROCHLORIDE 10 MG: 10 TABLET ORAL at 13:18

## 2018-01-01 RX ADMIN — MORPHINE SULFATE 45 MG: 15 TABLET, FILM COATED, EXTENDED RELEASE ORAL at 05:16

## 2018-01-01 RX ADMIN — STANDARDIZED SENNA CONCENTRATE AND DOCUSATE SODIUM 2 TABLET: 8.6; 5 TABLET, FILM COATED ORAL at 08:03

## 2018-01-01 RX ADMIN — GABAPENTIN 300 MG: 300 CAPSULE ORAL at 00:20

## 2018-01-01 RX ADMIN — MORPHINE SULFATE 60 MG: 60 TABLET, FILM COATED, EXTENDED RELEASE ORAL at 20:48

## 2018-01-01 RX ADMIN — ACYCLOVIR 200 MG: 200 CAPSULE ORAL at 10:23

## 2018-01-01 RX ADMIN — GABAPENTIN 300 MG: 300 CAPSULE ORAL at 08:34

## 2018-01-01 RX ADMIN — ONDANSETRON HYDROCHLORIDE 4 MG: 2 INJECTION, SOLUTION INTRAMUSCULAR; INTRAVENOUS at 06:55

## 2018-01-01 RX ADMIN — LEVOFLOXACIN 750 MG: 750 TABLET, FILM COATED ORAL at 08:45

## 2018-01-01 RX ADMIN — GABAPENTIN 300 MG: 300 CAPSULE ORAL at 20:16

## 2018-01-01 RX ADMIN — MORPHINE SULFATE 30 MG: 30 TABLET, EXTENDED RELEASE ORAL at 20:42

## 2018-01-01 RX ADMIN — ACYCLOVIR 200 MG: 200 CAPSULE ORAL at 09:45

## 2018-01-01 RX ADMIN — ONDANSETRON 4 MG: 4 TABLET, ORALLY DISINTEGRATING ORAL at 13:12

## 2018-01-01 RX ADMIN — STANDARDIZED SENNA CONCENTRATE AND DOCUSATE SODIUM 2 TABLET: 8.6; 5 TABLET, FILM COATED ORAL at 05:15

## 2018-01-01 RX ADMIN — GABAPENTIN 300 MG: 300 CAPSULE ORAL at 16:48

## 2018-01-01 RX ADMIN — BUPROPION HYDROCHLORIDE 75 MG: 75 TABLET, FILM COATED ORAL at 04:20

## 2018-01-01 RX ADMIN — ONDANSETRON 4 MG: 4 TABLET, ORALLY DISINTEGRATING ORAL at 15:32

## 2018-01-01 RX ADMIN — BUPROPION HYDROCHLORIDE 75 MG: 75 TABLET, FILM COATED ORAL at 05:00

## 2018-01-01 RX ADMIN — OXYCODONE HYDROCHLORIDE 5 MG: 5 TABLET ORAL at 10:30

## 2018-01-01 RX ADMIN — SODIUM CHLORIDE: 9 INJECTION, SOLUTION INTRAVENOUS at 05:35

## 2018-01-01 RX ADMIN — MORPHINE SULFATE 30 MG: 30 TABLET, EXTENDED RELEASE ORAL at 12:21

## 2018-01-01 RX ADMIN — STANDARDIZED SENNA CONCENTRATE AND DOCUSATE SODIUM 2 TABLET: 8.6; 5 TABLET, FILM COATED ORAL at 18:09

## 2018-01-01 RX ADMIN — GABAPENTIN 300 MG: 300 CAPSULE ORAL at 00:36

## 2018-01-01 RX ADMIN — OXYCODONE HYDROCHLORIDE 10 MG: 10 TABLET ORAL at 12:06

## 2018-01-01 RX ADMIN — MORPHINE SULFATE 30 MG: 30 TABLET, EXTENDED RELEASE ORAL at 20:58

## 2018-01-01 RX ADMIN — GABAPENTIN 300 MG: 300 CAPSULE ORAL at 18:10

## 2018-01-01 RX ADMIN — MORPHINE SULFATE 30 MG: 30 TABLET, EXTENDED RELEASE ORAL at 20:00

## 2018-01-01 RX ADMIN — MORPHINE SULFATE 30 MG: 30 TABLET, EXTENDED RELEASE ORAL at 08:17

## 2018-01-01 RX ADMIN — LEVOFLOXACIN 750 MG: 750 TABLET, FILM COATED ORAL at 12:33

## 2018-01-01 RX ADMIN — MORPHINE SULFATE 15 MG: 15 TABLET ORAL at 03:34

## 2018-01-01 RX ADMIN — MORPHINE SULFATE 30 MG: 30 TABLET, EXTENDED RELEASE ORAL at 12:28

## 2018-01-01 RX ADMIN — GABAPENTIN 300 MG: 300 CAPSULE ORAL at 16:23

## 2018-01-01 RX ADMIN — CEFAZOLIN 1000 MG: 330 INJECTION, POWDER, FOR SOLUTION INTRAMUSCULAR; INTRAVENOUS at 09:08

## 2018-01-01 RX ADMIN — GABAPENTIN 300 MG: 300 CAPSULE ORAL at 08:20

## 2018-01-01 RX ADMIN — SODIUM CHLORIDE: 9 INJECTION, SOLUTION INTRAVENOUS at 16:42

## 2018-01-01 RX ADMIN — GABAPENTIN 300 MG: 300 CAPSULE ORAL at 14:37

## 2018-01-01 RX ADMIN — BUPROPION HYDROCHLORIDE 75 MG: 75 TABLET, FILM COATED ORAL at 16:40

## 2018-01-01 RX ADMIN — BUPROPION HYDROCHLORIDE 75 MG: 75 TABLET, FILM COATED ORAL at 17:20

## 2018-01-01 RX ADMIN — SODIUM CHLORIDE 1000 ML: 9 INJECTION, SOLUTION INTRAVENOUS at 11:33

## 2018-01-01 RX ADMIN — SENNOSIDES AND DOCUSATE SODIUM 2 TABLET: 8.6; 5 TABLET ORAL at 20:08

## 2018-01-01 RX ADMIN — ACYCLOVIR 200 MG: 200 CAPSULE ORAL at 05:16

## 2018-01-01 RX ADMIN — GABAPENTIN 300 MG: 300 CAPSULE ORAL at 16:29

## 2018-01-01 RX ADMIN — GABAPENTIN 300 MG: 300 CAPSULE ORAL at 19:51

## 2018-01-01 RX ADMIN — ACYCLOVIR 200 MG: 200 CAPSULE ORAL at 21:03

## 2018-01-01 RX ADMIN — LEVOFLOXACIN 750 MG: 750 TABLET, FILM COATED ORAL at 07:40

## 2018-01-01 RX ADMIN — OXYCODONE HYDROCHLORIDE 10 MG: 10 TABLET ORAL at 22:26

## 2018-01-01 RX ADMIN — ENOXAPARIN SODIUM 40 MG: 100 INJECTION SUBCUTANEOUS at 08:18

## 2018-01-01 RX ADMIN — PIPERACILLIN SODIUM AND TAZOBACTAM SODIUM 3.38 G: 3; .375 INJECTION, POWDER, FOR SOLUTION INTRAVENOUS at 15:34

## 2018-01-01 RX ADMIN — GABAPENTIN 300 MG: 300 CAPSULE ORAL at 07:49

## 2018-01-01 RX ADMIN — METOCLOPRAMIDE 10 MG: 5 INJECTION, SOLUTION INTRAMUSCULAR; INTRAVENOUS at 08:21

## 2018-01-01 RX ADMIN — ACYCLOVIR 200 MG: 200 CAPSULE ORAL at 06:32

## 2018-01-01 RX ADMIN — GABAPENTIN 300 MG: 300 CAPSULE ORAL at 17:47

## 2018-01-01 RX ADMIN — MIDAZOLAM 1 MG: 1 INJECTION INTRAMUSCULAR; INTRAVENOUS at 15:10

## 2018-01-01 RX ADMIN — PIPERACILLIN SODIUM AND TAZOBACTAM SODIUM 3.38 G: 3; .375 INJECTION, POWDER, FOR SOLUTION INTRAVENOUS at 00:13

## 2018-01-01 RX ADMIN — GABAPENTIN 300 MG: 300 CAPSULE ORAL at 20:31

## 2018-01-01 RX ADMIN — ONDANSETRON 4 MG: 2 INJECTION, SOLUTION INTRAMUSCULAR; INTRAVENOUS at 06:48

## 2018-01-01 RX ADMIN — OXYCODONE HYDROCHLORIDE 10 MG: 10 TABLET ORAL at 15:30

## 2018-01-01 RX ADMIN — GABAPENTIN 300 MG: 300 CAPSULE ORAL at 00:53

## 2018-01-01 RX ADMIN — AZITHROMYCIN 250 MG: 250 TABLET, FILM COATED ORAL at 05:15

## 2018-01-01 RX ADMIN — OXYCODONE HYDROCHLORIDE 5 MG: 10 TABLET ORAL at 20:05

## 2018-01-01 RX ADMIN — MORPHINE SULFATE 30 MG: 30 TABLET, EXTENDED RELEASE ORAL at 20:15

## 2018-01-01 RX ADMIN — ACYCLOVIR 200 MG: 200 CAPSULE ORAL at 17:30

## 2018-01-01 RX ADMIN — MORPHINE SULFATE 30 MG: 30 TABLET, EXTENDED RELEASE ORAL at 09:07

## 2018-01-01 RX ADMIN — GABAPENTIN 300 MG: 300 CAPSULE ORAL at 09:11

## 2018-01-01 RX ADMIN — ONDANSETRON 4 MG: 2 INJECTION, SOLUTION INTRAMUSCULAR; INTRAVENOUS at 12:23

## 2018-01-01 RX ADMIN — SENNOSIDES AND DOCUSATE SODIUM 2 TABLET: 8.6; 5 TABLET ORAL at 09:45

## 2018-01-01 RX ADMIN — POLYETHYLENE GLYCOL 3350 1 PACKET: 17 POWDER, FOR SOLUTION ORAL at 10:25

## 2018-01-01 RX ADMIN — MORPHINE SULFATE 60 MG: 60 TABLET, EXTENDED RELEASE ORAL at 18:18

## 2018-01-01 RX ADMIN — GABAPENTIN 300 MG: 300 CAPSULE ORAL at 17:02

## 2018-01-01 RX ADMIN — OXYCODONE HYDROCHLORIDE 10 MG: 10 TABLET ORAL at 16:42

## 2018-01-01 RX ADMIN — GABAPENTIN 300 MG: 300 CAPSULE ORAL at 08:02

## 2018-01-01 RX ADMIN — LEVOFLOXACIN 750 MG: 750 TABLET, FILM COATED ORAL at 08:04

## 2018-01-01 RX ADMIN — MORPHINE SULFATE 30 MG: 30 TABLET, EXTENDED RELEASE ORAL at 09:02

## 2018-01-01 RX ADMIN — ACYCLOVIR 200 MG: 200 CAPSULE ORAL at 05:37

## 2018-01-01 RX ADMIN — LIDOCAINE HYDROCHLORIDE: 10 INJECTION, SOLUTION INFILTRATION; PERINEURAL at 09:20

## 2018-01-01 RX ADMIN — METOCLOPRAMIDE 10 MG: 5 INJECTION, SOLUTION INTRAMUSCULAR; INTRAVENOUS at 09:11

## 2018-01-01 RX ADMIN — AZITHROMYCIN 250 MG: 250 TABLET, FILM COATED ORAL at 09:01

## 2018-01-01 RX ADMIN — METOCLOPRAMIDE 10 MG: 5 INJECTION, SOLUTION INTRAMUSCULAR; INTRAVENOUS at 20:08

## 2018-01-01 RX ADMIN — OXYCODONE HYDROCHLORIDE 10 MG: 10 TABLET ORAL at 00:09

## 2018-01-01 RX ADMIN — MORPHINE SULFATE 60 MG: 60 TABLET, EXTENDED RELEASE ORAL at 05:36

## 2018-01-01 RX ADMIN — METOCLOPRAMIDE 10 MG: 5 INJECTION, SOLUTION INTRAMUSCULAR; INTRAVENOUS at 04:27

## 2018-01-01 RX ADMIN — ONDANSETRON HYDROCHLORIDE 4 MG: 2 INJECTION, SOLUTION INTRAMUSCULAR; INTRAVENOUS at 02:15

## 2018-01-01 RX ADMIN — NICOTINE 21 MG: 21 PATCH, EXTENDED RELEASE TRANSDERMAL at 06:03

## 2018-01-01 RX ADMIN — ENOXAPARIN SODIUM 40 MG: 100 INJECTION SUBCUTANEOUS at 08:42

## 2018-01-01 RX ADMIN — AZITHROMYCIN FOR INJECTION INJECTION, POWDER, LYOPHILIZED, FOR SOLUTION 500 MG: 500 INJECTION INTRAVENOUS at 05:45

## 2018-01-01 RX ADMIN — BUPROPION HYDROCHLORIDE 75 MG: 75 TABLET, FILM COATED ORAL at 04:58

## 2018-01-01 RX ADMIN — LEVOFLOXACIN 750 MG: 750 TABLET, FILM COATED ORAL at 08:28

## 2018-01-01 RX ADMIN — HEPARIN SODIUM 5000 UNITS: 5000 INJECTION, SOLUTION INTRAVENOUS; SUBCUTANEOUS at 15:04

## 2018-01-01 RX ADMIN — GABAPENTIN 300 MG: 300 CAPSULE ORAL at 17:03

## 2018-01-01 RX ADMIN — MORPHINE SULFATE 60 MG: 60 TABLET, FILM COATED, EXTENDED RELEASE ORAL at 20:16

## 2018-01-01 RX ADMIN — METOCLOPRAMIDE 10 MG: 5 INJECTION, SOLUTION INTRAMUSCULAR; INTRAVENOUS at 08:37

## 2018-01-01 RX ADMIN — ONDANSETRON 4 MG: 4 TABLET, ORALLY DISINTEGRATING ORAL at 15:42

## 2018-01-01 RX ADMIN — SODIUM CHLORIDE 1000 ML: 9 INJECTION, SOLUTION INTRAVENOUS at 06:15

## 2018-01-01 RX ADMIN — GABAPENTIN 300 MG: 300 CAPSULE ORAL at 16:09

## 2018-01-01 RX ADMIN — ACYCLOVIR 200 MG: 200 CAPSULE ORAL at 17:14

## 2018-01-01 RX ADMIN — CEFTRIAXONE 2 G: 2 INJECTION, POWDER, FOR SOLUTION INTRAMUSCULAR; INTRAVENOUS at 05:15

## 2018-01-01 RX ADMIN — SENNOSIDES AND DOCUSATE SODIUM 2 TABLET: 8.6; 5 TABLET ORAL at 21:12

## 2018-01-01 RX ADMIN — ENOXAPARIN SODIUM 40 MG: 100 INJECTION SUBCUTANEOUS at 09:41

## 2018-01-01 RX ADMIN — GABAPENTIN 300 MG: 300 CAPSULE ORAL at 21:05

## 2018-01-01 RX ADMIN — POTASSIUM CHLORIDE AND SODIUM CHLORIDE: 900; 150 INJECTION, SOLUTION INTRAVENOUS at 16:50

## 2018-01-01 RX ADMIN — STANDARDIZED SENNA CONCENTRATE AND DOCUSATE SODIUM 2 TABLET: 8.6; 5 TABLET, FILM COATED ORAL at 19:50

## 2018-01-01 RX ADMIN — PROMETHAZINE HYDROCHLORIDE 25 MG: 25 TABLET ORAL at 11:09

## 2018-01-01 RX ADMIN — ACYCLOVIR 200 MG: 200 CAPSULE ORAL at 08:17

## 2018-01-01 RX ADMIN — ENOXAPARIN SODIUM 40 MG: 100 INJECTION SUBCUTANEOUS at 08:20

## 2018-01-01 RX ADMIN — HYDROMORPHONE HYDROCHLORIDE 1 MG: 10 INJECTION, SOLUTION INTRAMUSCULAR; INTRAVENOUS; SUBCUTANEOUS at 10:40

## 2018-01-01 RX ADMIN — ONDANSETRON 4 MG: 4 TABLET, ORALLY DISINTEGRATING ORAL at 09:34

## 2018-01-01 RX ADMIN — LEVOFLOXACIN 750 MG: 750 TABLET, FILM COATED ORAL at 08:19

## 2018-01-01 RX ADMIN — MORPHINE SULFATE 60 MG: 60 TABLET, FILM COATED, EXTENDED RELEASE ORAL at 05:36

## 2018-01-01 RX ADMIN — PIPERACILLIN SODIUM AND TAZOBACTAM SODIUM 3.38 G: 3; .375 INJECTION, POWDER, FOR SOLUTION INTRAVENOUS at 15:40

## 2018-01-01 RX ADMIN — ACYCLOVIR 200 MG: 200 CAPSULE ORAL at 05:49

## 2018-01-01 RX ADMIN — GABAPENTIN 300 MG: 300 CAPSULE ORAL at 08:15

## 2018-01-01 RX ADMIN — ONDANSETRON HYDROCHLORIDE 4 MG: 2 INJECTION, SOLUTION INTRAMUSCULAR; INTRAVENOUS at 01:51

## 2018-01-01 RX ADMIN — METOCLOPRAMIDE 5 MG: 5 INJECTION, SOLUTION INTRAMUSCULAR; INTRAVENOUS at 12:42

## 2018-01-01 RX ADMIN — CEFTRIAXONE SODIUM 1 G: 1 INJECTION, POWDER, FOR SOLUTION INTRAMUSCULAR; INTRAVENOUS at 05:36

## 2018-01-01 RX ADMIN — PIPERACILLIN SODIUM AND TAZOBACTAM SODIUM 3.38 G: 3; .375 INJECTION, POWDER, FOR SOLUTION INTRAVENOUS at 15:07

## 2018-01-01 RX ADMIN — POTASSIUM CHLORIDE AND SODIUM CHLORIDE: 900; 150 INJECTION, SOLUTION INTRAVENOUS at 05:03

## 2018-01-01 RX ADMIN — SENNOSIDES AND DOCUSATE SODIUM 2 TABLET: 8.6; 5 TABLET ORAL at 08:33

## 2018-01-01 RX ADMIN — BUPROPION HYDROCHLORIDE 150 MG: 150 TABLET, EXTENDED RELEASE ORAL at 08:34

## 2018-01-01 RX ADMIN — ONDANSETRON HYDROCHLORIDE: 2 INJECTION, SOLUTION INTRAMUSCULAR; INTRAVENOUS at 06:48

## 2018-01-01 RX ADMIN — BUPROPION HYDROCHLORIDE 75 MG: 75 TABLET, FILM COATED ORAL at 16:23

## 2018-01-01 RX ADMIN — BUPROPION HYDROCHLORIDE 75 MG: 75 TABLET, FILM COATED ORAL at 05:21

## 2018-01-01 RX ADMIN — OXYCODONE HYDROCHLORIDE 5 MG: 5 TABLET ORAL at 18:38

## 2018-01-01 RX ADMIN — ONDANSETRON HYDROCHLORIDE 4 MG: 2 INJECTION, SOLUTION INTRAMUSCULAR; INTRAVENOUS at 04:01

## 2018-01-01 RX ADMIN — HEPARIN SODIUM 5000 UNITS: 5000 INJECTION, SOLUTION INTRAVENOUS; SUBCUTANEOUS at 15:30

## 2018-01-01 RX ADMIN — MORPHINE SULFATE 60 MG: 60 TABLET, EXTENDED RELEASE ORAL at 05:15

## 2018-01-01 RX ADMIN — STANDARDIZED SENNA CONCENTRATE AND DOCUSATE SODIUM 2 TABLET: 8.6; 5 TABLET, FILM COATED ORAL at 05:48

## 2018-01-01 RX ADMIN — BUPROPION HYDROCHLORIDE 75 MG: 75 TABLET, FILM COATED ORAL at 05:16

## 2018-01-01 RX ADMIN — OXYCODONE HYDROCHLORIDE 10 MG: 10 TABLET ORAL at 23:58

## 2018-01-01 RX ADMIN — GADODIAMIDE 15 ML: 287 INJECTION INTRAVENOUS at 18:47

## 2018-01-01 RX ADMIN — OXYCODONE HYDROCHLORIDE 10 MG: 10 TABLET ORAL at 16:16

## 2018-01-01 RX ADMIN — SENNOSIDES AND DOCUSATE SODIUM 2 TABLET: 8.6; 5 TABLET ORAL at 10:23

## 2018-01-01 RX ADMIN — STANDARDIZED SENNA CONCENTRATE AND DOCUSATE SODIUM 2 TABLET: 8.6; 5 TABLET, FILM COATED ORAL at 08:17

## 2018-01-01 RX ADMIN — BUPROPION HYDROCHLORIDE 75 MG: 75 TABLET, FILM COATED ORAL at 17:03

## 2018-01-01 RX ADMIN — ACYCLOVIR 200 MG: 200 CAPSULE ORAL at 08:33

## 2018-01-01 RX ADMIN — ACYCLOVIR 200 MG: 200 CAPSULE ORAL at 19:49

## 2018-01-01 RX ADMIN — GABAPENTIN 300 MG: 300 CAPSULE ORAL at 08:33

## 2018-01-01 RX ADMIN — MORPHINE SULFATE 30 MG: 30 TABLET, EXTENDED RELEASE ORAL at 08:27

## 2018-01-01 RX ADMIN — HEPARIN 500 UNITS: 100 SYRINGE at 16:42

## 2018-01-01 RX ADMIN — METOCLOPRAMIDE 10 MG: 5 INJECTION, SOLUTION INTRAMUSCULAR; INTRAVENOUS at 21:28

## 2018-01-01 RX ADMIN — BUPROPION HYDROCHLORIDE 150 MG: 150 TABLET, EXTENDED RELEASE ORAL at 10:19

## 2018-01-01 RX ADMIN — ACYCLOVIR 200 MG: 200 CAPSULE ORAL at 08:02

## 2018-01-01 RX ADMIN — ONDANSETRON 4 MG: 2 INJECTION, SOLUTION INTRAMUSCULAR; INTRAVENOUS at 13:17

## 2018-01-01 RX ADMIN — POTASSIUM CHLORIDE 40 MEQ: 1500 TABLET, EXTENDED RELEASE ORAL at 08:04

## 2018-01-01 RX ADMIN — PIPERACILLIN SODIUM AND TAZOBACTAM SODIUM 3.38 G: 3; .375 INJECTION, POWDER, FOR SOLUTION INTRAVENOUS at 21:11

## 2018-01-01 RX ADMIN — GABAPENTIN 300 MG: 300 CAPSULE ORAL at 11:40

## 2018-01-01 RX ADMIN — MORPHINE SULFATE 60 MG: 60 TABLET, EXTENDED RELEASE ORAL at 18:16

## 2018-01-01 RX ADMIN — BUPROPION HYDROCHLORIDE 75 MG: 75 TABLET, FILM COATED ORAL at 05:30

## 2018-01-01 RX ADMIN — BUPROPION HYDROCHLORIDE 75 MG: 75 TABLET, FILM COATED ORAL at 17:14

## 2018-01-01 RX ADMIN — GABAPENTIN 300 MG: 300 CAPSULE ORAL at 09:45

## 2018-01-01 RX ADMIN — MORPHINE SULFATE 30 MG: 30 TABLET, EXTENDED RELEASE ORAL at 19:49

## 2018-01-01 RX ADMIN — MORPHINE SULFATE 30 MG: 30 TABLET, EXTENDED RELEASE ORAL at 08:05

## 2018-01-01 RX ADMIN — ENOXAPARIN SODIUM 40 MG: 100 INJECTION SUBCUTANEOUS at 08:17

## 2018-01-01 RX ADMIN — MORPHINE SULFATE 4 MG: 4 INJECTION INTRAVENOUS at 08:04

## 2018-01-01 RX ADMIN — PROCHLORPERAZINE EDISYLATE 10 MG: 5 INJECTION INTRAMUSCULAR; INTRAVENOUS at 19:41

## 2018-01-01 RX ADMIN — POLYETHYLENE GLYCOL 3350 1 PACKET: 17 POWDER, FOR SOLUTION ORAL at 08:24

## 2018-01-01 RX ADMIN — ONDANSETRON HYDROCHLORIDE 4 MG: 2 INJECTION, SOLUTION INTRAMUSCULAR; INTRAVENOUS at 13:18

## 2018-01-01 RX ADMIN — METOCLOPRAMIDE 10 MG: 5 INJECTION, SOLUTION INTRAMUSCULAR; INTRAVENOUS at 07:54

## 2018-01-01 RX ADMIN — BUPROPION HYDROCHLORIDE 75 MG: 75 TABLET, FILM COATED ORAL at 05:55

## 2018-01-01 RX ADMIN — POTASSIUM CHLORIDE 40 MEQ: 1500 TABLET, EXTENDED RELEASE ORAL at 09:45

## 2018-01-01 RX ADMIN — SODIUM CHLORIDE: 9 INJECTION, SOLUTION INTRAVENOUS at 20:39

## 2018-01-01 RX ADMIN — PIPERACILLIN SODIUM AND TAZOBACTAM SODIUM 3.38 G: 3; .375 INJECTION, POWDER, FOR SOLUTION INTRAVENOUS at 16:13

## 2018-01-01 RX ADMIN — HEPARIN SODIUM 5000 UNITS: 5000 INJECTION, SOLUTION INTRAVENOUS; SUBCUTANEOUS at 13:08

## 2018-01-01 RX ADMIN — GABAPENTIN 300 MG: 300 CAPSULE ORAL at 08:17

## 2018-01-01 RX ADMIN — ACETAMINOPHEN 650 MG: 325 TABLET, FILM COATED ORAL at 14:48

## 2018-01-01 RX ADMIN — CEFDINIR 300 MG: 300 CAPSULE ORAL at 20:42

## 2018-01-01 RX ADMIN — SODIUM CHLORIDE: 9 INJECTION, SOLUTION INTRAVENOUS at 02:44

## 2018-01-01 RX ADMIN — ONDANSETRON 4 MG: 4 TABLET, ORALLY DISINTEGRATING ORAL at 22:30

## 2018-01-01 RX ADMIN — POTASSIUM CHLORIDE 40 MEQ: 1500 TABLET, EXTENDED RELEASE ORAL at 17:14

## 2018-01-01 RX ADMIN — OXYCODONE HYDROCHLORIDE 2.5 MG: 5 TABLET ORAL at 17:20

## 2018-01-01 RX ADMIN — BUPROPION HYDROCHLORIDE 75 MG: 75 TABLET, FILM COATED ORAL at 05:06

## 2018-01-01 RX ADMIN — GABAPENTIN 300 MG: 300 CAPSULE ORAL at 01:25

## 2018-01-01 RX ADMIN — OXYCODONE HYDROCHLORIDE 10 MG: 10 TABLET ORAL at 05:57

## 2018-01-01 RX ADMIN — MORPHINE SULFATE 7.5 MG: 15 TABLET ORAL at 15:46

## 2018-01-01 RX ADMIN — METOCLOPRAMIDE 10 MG: 5 INJECTION, SOLUTION INTRAMUSCULAR; INTRAVENOUS at 03:28

## 2018-01-01 RX ADMIN — STANDARDIZED SENNA CONCENTRATE AND DOCUSATE SODIUM 2 TABLET: 8.6; 5 TABLET, FILM COATED ORAL at 21:21

## 2018-01-01 RX ADMIN — GABAPENTIN 300 MG: 300 CAPSULE ORAL at 18:15

## 2018-01-01 RX ADMIN — PIPERACILLIN SODIUM AND TAZOBACTAM SODIUM 3.38 G: 3; .375 INJECTION, POWDER, FOR SOLUTION INTRAVENOUS at 07:40

## 2018-01-01 RX ADMIN — MAGNESIUM SULFATE IN DEXTROSE 1 G: 10 INJECTION, SOLUTION INTRAVENOUS at 14:23

## 2018-01-01 RX ADMIN — OXYCODONE HYDROCHLORIDE 5 MG: 5 TABLET ORAL at 17:20

## 2018-01-01 RX ADMIN — BUPROPION HYDROCHLORIDE 75 MG: 75 TABLET, FILM COATED ORAL at 05:43

## 2018-01-01 RX ADMIN — ONDANSETRON 4 MG: 4 TABLET, ORALLY DISINTEGRATING ORAL at 16:42

## 2018-01-01 RX ADMIN — STANDARDIZED SENNA CONCENTRATE AND DOCUSATE SODIUM 2 TABLET: 8.6; 5 TABLET, FILM COATED ORAL at 20:07

## 2018-01-01 RX ADMIN — SENNOSIDES AND DOCUSATE SODIUM 2 TABLET: 8.6; 5 TABLET ORAL at 20:35

## 2018-01-01 RX ADMIN — HYDROMORPHONE HYDROCHLORIDE 1 MG: 10 INJECTION, SOLUTION INTRAMUSCULAR; INTRAVENOUS; SUBCUTANEOUS at 06:08

## 2018-01-01 RX ADMIN — NICOTINE 21 MG: 21 PATCH, EXTENDED RELEASE TRANSDERMAL at 04:07

## 2018-01-01 RX ADMIN — PROCHLORPERAZINE EDISYLATE 10 MG: 5 INJECTION INTRAMUSCULAR; INTRAVENOUS at 12:33

## 2018-01-01 RX ADMIN — MORPHINE SULFATE 30 MG: 30 TABLET, EXTENDED RELEASE ORAL at 20:09

## 2018-01-01 RX ADMIN — GABAPENTIN 300 MG: 300 CAPSULE ORAL at 10:19

## 2018-01-01 RX ADMIN — GABAPENTIN 300 MG: 300 CAPSULE ORAL at 09:41

## 2018-01-01 RX ADMIN — ONDANSETRON 4 MG: 4 TABLET, ORALLY DISINTEGRATING ORAL at 13:07

## 2018-01-01 RX ADMIN — BUPROPION HYDROCHLORIDE 150 MG: 150 TABLET, EXTENDED RELEASE ORAL at 21:13

## 2018-01-01 RX ADMIN — ACYCLOVIR 200 MG: 200 CAPSULE ORAL at 07:44

## 2018-01-01 RX ADMIN — ONDANSETRON 4 MG: 4 TABLET, ORALLY DISINTEGRATING ORAL at 20:17

## 2018-01-01 RX ADMIN — ACYCLOVIR 200 MG: 200 CAPSULE ORAL at 05:04

## 2018-01-01 RX ADMIN — GABAPENTIN 300 MG: 300 CAPSULE ORAL at 16:40

## 2018-01-01 RX ADMIN — OXYCODONE HYDROCHLORIDE 5 MG: 5 TABLET ORAL at 07:55

## 2018-01-01 RX ADMIN — MORPHINE SULFATE 15 MG: 15 TABLET ORAL at 19:14

## 2018-01-01 RX ADMIN — BUPROPION HYDROCHLORIDE 75 MG: 75 TABLET, FILM COATED ORAL at 05:49

## 2018-01-01 RX ADMIN — ONDANSETRON 4 MG: 4 TABLET, ORALLY DISINTEGRATING ORAL at 19:04

## 2018-01-01 RX ADMIN — ACYCLOVIR 200 MG: 200 CAPSULE ORAL at 20:18

## 2018-01-01 RX ADMIN — PIPERACILLIN SODIUM AND TAZOBACTAM SODIUM 3.38 G: 3; .375 INJECTION, POWDER, FOR SOLUTION INTRAVENOUS at 08:02

## 2018-01-01 RX ADMIN — OXYCODONE HYDROCHLORIDE 5 MG: 5 TABLET ORAL at 15:03

## 2018-01-01 RX ADMIN — STANDARDIZED SENNA CONCENTRATE AND DOCUSATE SODIUM 2 TABLET: 8.6; 5 TABLET, FILM COATED ORAL at 08:15

## 2018-01-01 RX ADMIN — CEFTRIAXONE SODIUM 1 G: 1 INJECTION, POWDER, FOR SOLUTION INTRAMUSCULAR; INTRAVENOUS at 13:27

## 2018-01-01 RX ADMIN — GABAPENTIN 300 MG: 300 CAPSULE ORAL at 16:07

## 2018-01-01 RX ADMIN — HYDROMORPHONE HYDROCHLORIDE 4 MG: 4 TABLET ORAL at 02:57

## 2018-01-01 RX ADMIN — POTASSIUM CHLORIDE 20 MEQ: 1500 TABLET, EXTENDED RELEASE ORAL at 20:15

## 2018-01-01 RX ADMIN — STANDARDIZED SENNA CONCENTRATE AND DOCUSATE SODIUM 2 TABLET: 8.6; 5 TABLET, FILM COATED ORAL at 21:05

## 2018-01-01 RX ADMIN — OXYCODONE HYDROCHLORIDE 2.5 MG: 5 TABLET ORAL at 05:41

## 2018-01-01 RX ADMIN — MORPHINE SULFATE 30 MG: 30 TABLET, EXTENDED RELEASE ORAL at 20:08

## 2018-01-01 RX ADMIN — LEVOFLOXACIN 750 MG: 750 TABLET, FILM COATED ORAL at 11:40

## 2018-01-01 RX ADMIN — ONDANSETRON 4 MG: 2 INJECTION INTRAMUSCULAR; INTRAVENOUS at 11:33

## 2018-01-01 RX ADMIN — ONDANSETRON 4 MG: 4 TABLET, ORALLY DISINTEGRATING ORAL at 15:05

## 2018-01-01 RX ADMIN — ACYCLOVIR 200 MG: 200 CAPSULE ORAL at 21:23

## 2018-01-01 RX ADMIN — ACYCLOVIR 200 MG: 200 CAPSULE ORAL at 08:28

## 2018-01-01 RX ADMIN — POLYETHYLENE GLYCOL 3350 1 PACKET: 17 POWDER, FOR SOLUTION ORAL at 21:13

## 2018-01-01 RX ADMIN — NICOTINE 21 MG: 21 PATCH, EXTENDED RELEASE TRANSDERMAL at 06:14

## 2018-01-01 RX ADMIN — MORPHINE SULFATE 30 MG: 30 TABLET, EXTENDED RELEASE ORAL at 21:23

## 2018-01-01 RX ADMIN — CEFDINIR 300 MG: 300 CAPSULE ORAL at 09:02

## 2018-01-01 RX ADMIN — SIMETHICONE 40 MG: 20 SUSPENSION/ DROPS ORAL at 07:40

## 2018-01-01 RX ADMIN — GABAPENTIN 300 MG: 300 CAPSULE ORAL at 08:21

## 2018-01-01 RX ADMIN — BUPROPION HYDROCHLORIDE 75 MG: 75 TABLET, FILM COATED ORAL at 16:19

## 2018-01-01 RX ADMIN — GABAPENTIN 300 MG: 300 CAPSULE ORAL at 10:23

## 2018-01-01 RX ADMIN — ONDANSETRON 4 MG: 4 TABLET, ORALLY DISINTEGRATING ORAL at 10:26

## 2018-01-01 RX ADMIN — ACYCLOVIR 200 MG: 200 CAPSULE ORAL at 08:04

## 2018-01-01 RX ADMIN — CEFTRIAXONE 2 G: 2 INJECTION, POWDER, FOR SOLUTION INTRAMUSCULAR; INTRAVENOUS at 07:49

## 2018-01-01 RX ADMIN — GABAPENTIN 300 MG: 300 CAPSULE ORAL at 15:35

## 2018-01-01 RX ADMIN — ACYCLOVIR 200 MG: 200 CAPSULE ORAL at 05:36

## 2018-01-01 RX ADMIN — SENNOSIDES AND DOCUSATE SODIUM 2 TABLET: 8.6; 5 TABLET ORAL at 20:15

## 2018-01-01 RX ADMIN — GABAPENTIN 300 MG: 300 CAPSULE ORAL at 16:10

## 2018-01-01 RX ADMIN — GABAPENTIN 300 MG: 300 CAPSULE ORAL at 02:36

## 2018-01-01 RX ADMIN — SENNOSIDES AND DOCUSATE SODIUM 2 TABLET: 8.6; 5 TABLET ORAL at 08:17

## 2018-01-01 RX ADMIN — MORPHINE SULFATE 45 MG: 15 TABLET, EXTENDED RELEASE ORAL at 20:16

## 2018-01-01 RX ADMIN — GABAPENTIN 300 MG: 300 CAPSULE ORAL at 09:18

## 2018-01-01 RX ADMIN — METOCLOPRAMIDE 10 MG: 5 INJECTION, SOLUTION INTRAMUSCULAR; INTRAVENOUS at 14:18

## 2018-01-01 RX ADMIN — OXYCODONE HYDROCHLORIDE 5 MG: 5 TABLET ORAL at 02:52

## 2018-01-01 RX ADMIN — FUROSEMIDE 20 MG: 20 TABLET ORAL at 05:15

## 2018-01-01 RX ADMIN — OXYCODONE HYDROCHLORIDE 5 MG: 5 TABLET ORAL at 16:34

## 2018-01-01 RX ADMIN — ENOXAPARIN SODIUM 40 MG: 100 INJECTION SUBCUTANEOUS at 08:28

## 2018-01-01 RX ADMIN — SENNOSIDES AND DOCUSATE SODIUM 2 TABLET: 8.6; 5 TABLET ORAL at 20:09

## 2018-01-01 RX ADMIN — MORPHINE SULFATE 30 MG: 30 TABLET, EXTENDED RELEASE ORAL at 20:35

## 2018-01-01 RX ADMIN — CEFTRIAXONE 2 G: 2 INJECTION, POWDER, FOR SOLUTION INTRAMUSCULAR; INTRAVENOUS at 04:56

## 2018-01-01 RX ADMIN — ACYCLOVIR 200 MG: 200 CAPSULE ORAL at 20:34

## 2018-01-01 RX ADMIN — GABAPENTIN 300 MG: 300 CAPSULE ORAL at 11:45

## 2018-01-01 RX ADMIN — HYDROMORPHONE HYDROCHLORIDE 0.25 MG: 10 INJECTION, SOLUTION INTRAMUSCULAR; INTRAVENOUS; SUBCUTANEOUS at 21:03

## 2018-01-01 RX ADMIN — MORPHINE SULFATE 45 MG: 15 TABLET, FILM COATED, EXTENDED RELEASE ORAL at 18:08

## 2018-01-01 RX ADMIN — GABAPENTIN 300 MG: 300 CAPSULE ORAL at 05:03

## 2018-01-01 RX ADMIN — HYDROMORPHONE HYDROCHLORIDE 4 MG: 4 TABLET ORAL at 21:07

## 2018-01-01 RX ADMIN — GABAPENTIN 300 MG: 300 CAPSULE ORAL at 15:04

## 2018-01-01 RX ADMIN — GABAPENTIN 300 MG: 300 CAPSULE ORAL at 01:33

## 2018-01-01 RX ADMIN — ACYCLOVIR 200 MG: 200 CAPSULE ORAL at 20:48

## 2018-01-01 RX ADMIN — LEVOFLOXACIN 750 MG: 750 TABLET, FILM COATED ORAL at 08:17

## 2018-01-01 RX ADMIN — MORPHINE SULFATE 45 MG: 15 TABLET, EXTENDED RELEASE ORAL at 08:17

## 2018-01-01 RX ADMIN — PIPERACILLIN SODIUM AND TAZOBACTAM SODIUM 3.38 G: 3; .375 INJECTION, POWDER, FOR SOLUTION INTRAVENOUS at 15:24

## 2018-01-01 RX ADMIN — BUPROPION HYDROCHLORIDE 75 MG: 75 TABLET, FILM COATED ORAL at 17:02

## 2018-01-01 RX ADMIN — POTASSIUM CHLORIDE AND SODIUM CHLORIDE: 900; 150 INJECTION, SOLUTION INTRAVENOUS at 12:32

## 2018-01-01 RX ADMIN — FUROSEMIDE 40 MG: 10 INJECTION, SOLUTION INTRAMUSCULAR; INTRAVENOUS at 06:01

## 2018-01-01 RX ADMIN — SENNOSIDES AND DOCUSATE SODIUM 2 TABLET: 8.6; 5 TABLET ORAL at 08:20

## 2018-01-01 RX ADMIN — PIPERACILLIN SODIUM AND TAZOBACTAM SODIUM 3.38 G: 3; .375 INJECTION, POWDER, FOR SOLUTION INTRAVENOUS at 07:51

## 2018-01-01 RX ADMIN — OXYCODONE HYDROCHLORIDE 10 MG: 10 TABLET ORAL at 10:47

## 2018-01-01 RX ADMIN — LIDOCAINE HYDROCHLORIDE AND EPINEPHRINE: 20; 10 INJECTION, SOLUTION INFILTRATION; PERINEURAL at 09:19

## 2018-01-01 RX ADMIN — ENOXAPARIN SODIUM 40 MG: 100 INJECTION SUBCUTANEOUS at 09:19

## 2018-01-01 RX ADMIN — MORPHINE SULFATE 30 MG: 30 TABLET, EXTENDED RELEASE ORAL at 08:43

## 2018-01-01 RX ADMIN — BUPROPION HYDROCHLORIDE 150 MG: 150 TABLET, EXTENDED RELEASE ORAL at 20:16

## 2018-01-01 RX ADMIN — MORPHINE SULFATE 30 MG: 30 TABLET, EXTENDED RELEASE ORAL at 08:04

## 2018-01-01 RX ADMIN — SIMETHICONE 40 MG: 20 SUSPENSION/ DROPS ORAL at 15:14

## 2018-01-01 RX ADMIN — STANDARDIZED SENNA CONCENTRATE AND DOCUSATE SODIUM 2 TABLET: 8.6; 5 TABLET, FILM COATED ORAL at 21:06

## 2018-01-01 RX ADMIN — MORPHINE SULFATE 30 MG: 30 TABLET, EXTENDED RELEASE ORAL at 10:43

## 2018-01-01 RX ADMIN — MORPHINE SULFATE 30 MG: 30 TABLET, EXTENDED RELEASE ORAL at 08:15

## 2018-01-01 RX ADMIN — BUPROPION HYDROCHLORIDE 75 MG: 75 TABLET, FILM COATED ORAL at 16:58

## 2018-01-01 RX ADMIN — GABAPENTIN 300 MG: 300 CAPSULE ORAL at 06:33

## 2018-01-01 RX ADMIN — AZITHROMYCIN 500 MG: 250 TABLET, FILM COATED ORAL at 17:38

## 2018-01-01 RX ADMIN — OXYCODONE HYDROCHLORIDE 5 MG: 5 TABLET ORAL at 10:32

## 2018-01-01 RX ADMIN — POTASSIUM CHLORIDE 40 MEQ: 1500 TABLET, EXTENDED RELEASE ORAL at 05:49

## 2018-01-01 RX ADMIN — SENNOSIDES AND DOCUSATE SODIUM 2 TABLET: 8.6; 5 TABLET ORAL at 08:44

## 2018-01-01 RX ADMIN — ACYCLOVIR 200 MG: 200 CAPSULE ORAL at 17:58

## 2018-01-01 RX ADMIN — MORPHINE SULFATE 15 MG: 15 TABLET ORAL at 05:23

## 2018-01-01 RX ADMIN — GABAPENTIN 300 MG: 300 CAPSULE ORAL at 16:35

## 2018-01-01 RX ADMIN — HEPARIN SODIUM 5000 UNITS: 5000 INJECTION, SOLUTION INTRAVENOUS; SUBCUTANEOUS at 06:23

## 2018-01-01 RX ADMIN — MORPHINE SULFATE 5 MG: 100 SOLUTION ORAL at 13:00

## 2018-01-01 RX ADMIN — GABAPENTIN 300 MG: 300 CAPSULE ORAL at 01:45

## 2018-01-01 RX ADMIN — ACYCLOVIR 200 MG: 200 CAPSULE ORAL at 20:58

## 2018-01-01 RX ADMIN — STANDARDIZED SENNA CONCENTRATE AND DOCUSATE SODIUM 2 TABLET: 8.6; 5 TABLET, FILM COATED ORAL at 09:00

## 2018-01-01 RX ADMIN — MORPHINE SULFATE 60 MG: 60 TABLET, FILM COATED, EXTENDED RELEASE ORAL at 09:45

## 2018-01-01 RX ADMIN — ACYCLOVIR 200 MG: 200 CAPSULE ORAL at 18:15

## 2018-01-01 RX ADMIN — GABAPENTIN 300 MG: 300 CAPSULE ORAL at 00:33

## 2018-01-01 RX ADMIN — ENOXAPARIN SODIUM 40 MG: 100 INJECTION SUBCUTANEOUS at 09:44

## 2018-01-01 RX ADMIN — CEFTRIAXONE SODIUM 1 G: 1 INJECTION, POWDER, FOR SOLUTION INTRAMUSCULAR; INTRAVENOUS at 07:03

## 2018-01-01 RX ADMIN — FUROSEMIDE 40 MG: 10 INJECTION, SOLUTION INTRAMUSCULAR; INTRAVENOUS at 15:34

## 2018-01-01 RX ADMIN — ENOXAPARIN SODIUM 40 MG: 100 INJECTION SUBCUTANEOUS at 08:03

## 2018-01-01 RX ADMIN — GABAPENTIN 300 MG: 300 CAPSULE ORAL at 20:42

## 2018-01-01 RX ADMIN — ONDANSETRON HYDROCHLORIDE 4 MG: 2 INJECTION, SOLUTION INTRAMUSCULAR; INTRAVENOUS at 11:16

## 2018-01-01 RX ADMIN — OXYCODONE HYDROCHLORIDE 10 MG: 10 TABLET ORAL at 08:17

## 2018-01-01 RX ADMIN — PROMETHAZINE HYDROCHLORIDE 12.5 MG: 25 TABLET ORAL at 17:06

## 2018-01-01 RX ADMIN — OXYCODONE HYDROCHLORIDE 5 MG: 5 TABLET ORAL at 14:32

## 2018-01-01 RX ADMIN — ACYCLOVIR 200 MG: 200 CAPSULE ORAL at 08:20

## 2018-01-01 RX ADMIN — ONDANSETRON 4 MG: 4 TABLET, ORALLY DISINTEGRATING ORAL at 08:13

## 2018-01-01 RX ADMIN — GABAPENTIN 300 MG: 300 CAPSULE ORAL at 15:13

## 2018-01-01 RX ADMIN — SODIUM CHLORIDE: 9 INJECTION, SOLUTION INTRAVENOUS at 06:06

## 2018-01-01 RX ADMIN — ONDANSETRON 4 MG: 2 INJECTION, SOLUTION INTRAMUSCULAR; INTRAVENOUS at 07:49

## 2018-01-01 RX ADMIN — MORPHINE SULFATE 60 MG: 60 TABLET, EXTENDED RELEASE ORAL at 18:08

## 2018-01-01 RX ADMIN — SENNOSIDES AND DOCUSATE SODIUM 2 TABLET: 8.6; 5 TABLET ORAL at 17:58

## 2018-01-01 RX ADMIN — ONDANSETRON 4 MG: 2 INJECTION, SOLUTION INTRAMUSCULAR; INTRAVENOUS at 08:01

## 2018-01-01 RX ADMIN — BUPROPION HYDROCHLORIDE 75 MG: 75 TABLET, FILM COATED ORAL at 20:09

## 2018-01-01 RX ADMIN — MORPHINE SULFATE 30 MG: 30 TABLET, EXTENDED RELEASE ORAL at 21:03

## 2018-01-01 RX ADMIN — GABAPENTIN 300 MG: 300 CAPSULE ORAL at 10:32

## 2018-01-01 RX ADMIN — BUPROPION HYDROCHLORIDE 75 MG: 75 TABLET, FILM COATED ORAL at 05:22

## 2018-01-01 RX ADMIN — ACYCLOVIR 200 MG: 200 CAPSULE ORAL at 20:07

## 2018-01-01 RX ADMIN — ACYCLOVIR 200 MG: 200 CAPSULE ORAL at 09:27

## 2018-01-01 RX ADMIN — MORPHINE SULFATE 7.5 MG: 15 TABLET ORAL at 16:19

## 2018-01-01 RX ADMIN — SENNOSIDES AND DOCUSATE SODIUM 2 TABLET: 8.6; 5 TABLET ORAL at 20:07

## 2018-01-01 RX ADMIN — BUPROPION HYDROCHLORIDE 75 MG: 75 TABLET, FILM COATED ORAL at 06:01

## 2018-01-01 RX ADMIN — OXYCODONE HYDROCHLORIDE 5 MG: 5 TABLET ORAL at 15:56

## 2018-01-01 RX ADMIN — SODIUM CHLORIDE: 9 INJECTION, SOLUTION INTRAVENOUS at 08:33

## 2018-01-01 RX ADMIN — BUPROPION HYDROCHLORIDE 150 MG: 150 TABLET, EXTENDED RELEASE ORAL at 20:07

## 2018-01-01 RX ADMIN — POTASSIUM CHLORIDE 40 MEQ: 1500 TABLET, EXTENDED RELEASE ORAL at 21:10

## 2018-01-01 RX ADMIN — STANDARDIZED SENNA CONCENTRATE AND DOCUSATE SODIUM 2 TABLET: 8.6; 5 TABLET, FILM COATED ORAL at 20:40

## 2018-01-01 RX ADMIN — ACYCLOVIR 200 MG: 200 CAPSULE ORAL at 20:16

## 2018-01-01 RX ADMIN — OXYCODONE HYDROCHLORIDE 5 MG: 5 TABLET ORAL at 14:14

## 2018-01-01 RX ADMIN — PIPERACILLIN SODIUM AND TAZOBACTAM SODIUM 3.38 G: 3; .375 INJECTION, POWDER, FOR SOLUTION INTRAVENOUS at 23:20

## 2018-01-01 RX ADMIN — ACYCLOVIR 200 MG: 200 CAPSULE ORAL at 07:40

## 2018-01-01 RX ADMIN — MORPHINE SULFATE 30 MG: 30 TABLET, EXTENDED RELEASE ORAL at 21:05

## 2018-01-01 RX ADMIN — ENOXAPARIN SODIUM 40 MG: 100 INJECTION SUBCUTANEOUS at 05:05

## 2018-01-01 RX ADMIN — POTASSIUM CHLORIDE 10 MEQ: 7.46 INJECTION, SOLUTION INTRAVENOUS at 04:07

## 2018-01-01 RX ADMIN — HEPARIN SODIUM 5000 UNITS: 5000 INJECTION, SOLUTION INTRAVENOUS; SUBCUTANEOUS at 21:06

## 2018-01-01 RX ADMIN — BUPROPION HYDROCHLORIDE 75 MG: 75 TABLET, FILM COATED ORAL at 16:59

## 2018-01-01 RX ADMIN — STANDARDIZED SENNA CONCENTRATE AND DOCUSATE SODIUM 2 TABLET: 8.6; 5 TABLET, FILM COATED ORAL at 05:21

## 2018-01-01 RX ADMIN — AZITHROMYCIN FOR INJECTION INJECTION, POWDER, LYOPHILIZED, FOR SOLUTION 500 MG: 500 INJECTION INTRAVENOUS at 06:16

## 2018-01-01 RX ADMIN — GABAPENTIN 300 MG: 300 CAPSULE ORAL at 20:47

## 2018-01-01 RX ADMIN — OXYCODONE HYDROCHLORIDE 10 MG: 10 TABLET ORAL at 12:50

## 2018-01-01 RX ADMIN — CEFTRIAXONE 2 G: 2 INJECTION, POWDER, FOR SOLUTION INTRAMUSCULAR; INTRAVENOUS at 05:12

## 2018-01-01 RX ADMIN — BUPROPION HYDROCHLORIDE 75 MG: 75 TABLET, FILM COATED ORAL at 16:07

## 2018-01-01 RX ADMIN — OXYCODONE HYDROCHLORIDE 10 MG: 10 TABLET ORAL at 17:53

## 2018-01-01 RX ADMIN — OXYCODONE HYDROCHLORIDE 5 MG: 5 TABLET ORAL at 06:22

## 2018-01-01 RX ADMIN — HYDROMORPHONE HYDROCHLORIDE 1 MG: 10 INJECTION, SOLUTION INTRAMUSCULAR; INTRAVENOUS; SUBCUTANEOUS at 16:54

## 2018-01-01 RX ADMIN — ENOXAPARIN SODIUM 40 MG: 100 INJECTION SUBCUTANEOUS at 07:45

## 2018-01-01 RX ADMIN — ACYCLOVIR 200 MG: 200 CAPSULE ORAL at 20:10

## 2018-01-01 RX ADMIN — POTASSIUM CHLORIDE AND SODIUM CHLORIDE: 900; 150 INJECTION, SOLUTION INTRAVENOUS at 00:36

## 2018-01-01 RX ADMIN — SENNOSIDES AND DOCUSATE SODIUM 2 TABLET: 8.6; 5 TABLET ORAL at 20:18

## 2018-01-01 RX ADMIN — MORPHINE SULFATE 30 MG: 30 TABLET, EXTENDED RELEASE ORAL at 07:44

## 2018-01-01 RX ADMIN — STANDARDIZED SENNA CONCENTRATE AND DOCUSATE SODIUM 2 TABLET: 8.6; 5 TABLET, FILM COATED ORAL at 18:08

## 2018-01-01 RX ADMIN — MORPHINE SULFATE 4 MG: 4 INJECTION INTRAVENOUS at 11:32

## 2018-01-01 RX ADMIN — HEPARIN SODIUM 5000 UNITS: 5000 INJECTION, SOLUTION INTRAVENOUS; SUBCUTANEOUS at 22:00

## 2018-01-01 RX ADMIN — LEVOFLOXACIN 750 MG: 750 INJECTION, SOLUTION INTRAVENOUS at 09:08

## 2018-01-01 RX ADMIN — GABAPENTIN 300 MG: 300 CAPSULE ORAL at 15:47

## 2018-01-01 RX ADMIN — POTASSIUM CHLORIDE 40 MEQ: 1500 TABLET, EXTENDED RELEASE ORAL at 09:41

## 2018-01-01 RX ADMIN — SODIUM CHLORIDE: 9 INJECTION, SOLUTION INTRAVENOUS at 05:58

## 2018-01-01 RX ADMIN — ONDANSETRON 4 MG: 4 TABLET, ORALLY DISINTEGRATING ORAL at 09:11

## 2018-01-01 RX ADMIN — HYDROMORPHONE HYDROCHLORIDE 4 MG: 4 TABLET ORAL at 08:16

## 2018-01-01 RX ADMIN — BUPROPION HYDROCHLORIDE 75 MG: 75 TABLET, FILM COATED ORAL at 16:48

## 2018-01-01 RX ADMIN — GABAPENTIN 300 MG: 300 CAPSULE ORAL at 16:58

## 2018-01-01 RX ADMIN — GABAPENTIN 300 MG: 300 CAPSULE ORAL at 18:38

## 2018-01-01 RX ADMIN — PROMETHAZINE HYDROCHLORIDE 25 MG: 25 TABLET ORAL at 14:06

## 2018-01-01 RX ADMIN — POTASSIUM CHLORIDE 40 MEQ: 1500 TABLET, EXTENDED RELEASE ORAL at 10:21

## 2018-01-01 RX ADMIN — OXYCODONE HYDROCHLORIDE 5 MG: 5 TABLET ORAL at 19:19

## 2018-01-01 RX ADMIN — ACYCLOVIR 200 MG: 200 CAPSULE ORAL at 20:47

## 2018-01-01 RX ADMIN — ONDANSETRON 4 MG: 4 TABLET, ORALLY DISINTEGRATING ORAL at 19:19

## 2018-01-01 RX ADMIN — HEPARIN SODIUM 5000 UNITS: 5000 INJECTION, SOLUTION INTRAVENOUS; SUBCUTANEOUS at 21:56

## 2018-01-01 RX ADMIN — GABAPENTIN 300 MG: 300 CAPSULE ORAL at 17:13

## 2018-01-01 RX ADMIN — POTASSIUM CHLORIDE 20 MEQ: 1500 TABLET, EXTENDED RELEASE ORAL at 09:07

## 2018-01-01 RX ADMIN — SODIUM CHLORIDE: 9 INJECTION, SOLUTION INTRAVENOUS at 00:18

## 2018-01-01 RX ADMIN — ONDANSETRON 4 MG: 4 TABLET, ORALLY DISINTEGRATING ORAL at 22:05

## 2018-01-01 RX ADMIN — METOCLOPRAMIDE 10 MG: 5 INJECTION, SOLUTION INTRAMUSCULAR; INTRAVENOUS at 08:34

## 2018-01-01 RX ADMIN — BUPROPION HYDROCHLORIDE 150 MG: 150 TABLET, EXTENDED RELEASE ORAL at 08:24

## 2018-01-01 RX ADMIN — ONDANSETRON 4 MG: 4 TABLET, ORALLY DISINTEGRATING ORAL at 05:48

## 2018-01-01 RX ADMIN — BUPROPION HYDROCHLORIDE 150 MG: 150 TABLET, EXTENDED RELEASE ORAL at 21:56

## 2018-01-01 RX ADMIN — OXYCODONE HYDROCHLORIDE 5 MG: 5 TABLET ORAL at 09:39

## 2018-01-01 RX ADMIN — GABAPENTIN 300 MG: 300 CAPSULE ORAL at 16:59

## 2018-01-01 RX ADMIN — PIPERACILLIN SODIUM AND TAZOBACTAM SODIUM 3.38 G: 3; .375 INJECTION, POWDER, FOR SOLUTION INTRAVENOUS at 09:17

## 2018-01-01 RX ADMIN — HEPARIN SODIUM 5000 UNITS: 5000 INJECTION, SOLUTION INTRAVENOUS; SUBCUTANEOUS at 05:45

## 2018-01-01 RX ADMIN — MORPHINE SULFATE 30 MG: 30 TABLET, EXTENDED RELEASE ORAL at 21:56

## 2018-01-01 RX ADMIN — SENNOSIDES AND DOCUSATE SODIUM 2 TABLET: 8.6; 5 TABLET ORAL at 10:32

## 2018-01-01 RX ADMIN — OXYCODONE HYDROCHLORIDE 10 MG: 10 TABLET ORAL at 08:22

## 2018-01-01 RX ADMIN — SENNOSIDES AND DOCUSATE SODIUM 2 TABLET: 8.6; 5 TABLET ORAL at 20:48

## 2018-01-01 RX ADMIN — GABAPENTIN 300 MG: 300 CAPSULE ORAL at 21:14

## 2018-01-01 RX ADMIN — ACYCLOVIR 200 MG: 200 CAPSULE ORAL at 20:08

## 2018-01-01 RX ADMIN — MIDAZOLAM HYDROCHLORIDE 1 MG: 1 INJECTION INTRAMUSCULAR; INTRAVENOUS at 15:10

## 2018-01-01 RX ADMIN — GABAPENTIN 300 MG: 300 CAPSULE ORAL at 20:07

## 2018-01-01 RX ADMIN — BUPROPION HYDROCHLORIDE: 75 TABLET, FILM COATED ORAL at 05:16

## 2018-01-01 RX ADMIN — MORPHINE SULFATE 15 MG: 15 TABLET ORAL at 20:00

## 2018-01-01 RX ADMIN — ONDANSETRON 4 MG: 4 TABLET, ORALLY DISINTEGRATING ORAL at 10:17

## 2018-01-01 RX ADMIN — STANDARDIZED SENNA CONCENTRATE AND DOCUSATE SODIUM 2 TABLET: 8.6; 5 TABLET, FILM COATED ORAL at 19:44

## 2018-01-01 RX ADMIN — VANCOMYCIN HYDROCHLORIDE 1700 MG: 100 INJECTION, POWDER, LYOPHILIZED, FOR SOLUTION INTRAVENOUS at 10:26

## 2018-01-01 RX ADMIN — ACYCLOVIR 200 MG: 200 CAPSULE ORAL at 20:17

## 2018-01-01 RX ADMIN — SENNOSIDES AND DOCUSATE SODIUM 2 TABLET: 8.6; 5 TABLET ORAL at 20:51

## 2018-01-01 RX ADMIN — GABAPENTIN 300 MG: 300 CAPSULE ORAL at 21:56

## 2018-01-01 RX ADMIN — ACYCLOVIR 200 MG: 200 CAPSULE ORAL at 21:10

## 2018-01-01 RX ADMIN — AZITHROMYCIN 250 MG: 250 TABLET, FILM COATED ORAL at 05:36

## 2018-01-01 RX ADMIN — ACYCLOVIR 200 MG: 200 CAPSULE ORAL at 18:09

## 2018-01-01 RX ADMIN — ACETAMINOPHEN 650 MG: 325 TABLET, FILM COATED ORAL at 07:44

## 2018-01-01 RX ADMIN — SENNOSIDES AND DOCUSATE SODIUM 2 TABLET: 8.6; 5 TABLET ORAL at 05:36

## 2018-01-01 RX ADMIN — GABAPENTIN 300 MG: 300 CAPSULE ORAL at 16:04

## 2018-01-01 RX ADMIN — MORPHINE SULFATE 60 MG: 60 TABLET, FILM COATED, EXTENDED RELEASE ORAL at 17:58

## 2018-01-01 RX ADMIN — MORPHINE SULFATE 60 MG: 60 TABLET, FILM COATED, EXTENDED RELEASE ORAL at 17:29

## 2018-01-01 RX ADMIN — MORPHINE SULFATE 30 MG: 30 TABLET, EXTENDED RELEASE ORAL at 20:16

## 2018-01-01 RX ADMIN — HEPARIN SODIUM 5000 UNITS: 5000 INJECTION, SOLUTION INTRAVENOUS; SUBCUTANEOUS at 21:12

## 2018-01-01 RX ADMIN — OXYCODONE HYDROCHLORIDE 10 MG: 10 TABLET ORAL at 02:07

## 2018-01-01 RX ADMIN — BUPROPION HYDROCHLORIDE 75 MG: 75 TABLET, FILM COATED ORAL at 05:37

## 2018-01-01 RX ADMIN — MORPHINE SULFATE 30 MG: 30 TABLET, EXTENDED RELEASE ORAL at 08:20

## 2018-01-01 RX ADMIN — HYDROMORPHONE HYDROCHLORIDE 4 MG: 4 TABLET ORAL at 23:29

## 2018-01-01 RX ADMIN — BUPROPION HYDROCHLORIDE 150 MG: 150 TABLET, EXTENDED RELEASE ORAL at 09:46

## 2018-01-01 RX ADMIN — IBUPROFEN 400 MG: 400 TABLET, FILM COATED ORAL at 08:21

## 2018-01-01 RX ADMIN — AZITHROMYCIN FOR INJECTION INJECTION, POWDER, LYOPHILIZED, FOR SOLUTION 500 MG: 500 INJECTION INTRAVENOUS at 00:52

## 2018-01-01 RX ADMIN — NICOTINE 21 MG: 21 PATCH, EXTENDED RELEASE TRANSDERMAL at 04:24

## 2018-01-01 RX ADMIN — ONDANSETRON HYDROCHLORIDE 4 MG: 2 INJECTION, SOLUTION INTRAMUSCULAR; INTRAVENOUS at 04:15

## 2018-01-01 RX ADMIN — POTASSIUM CHLORIDE 40 MEQ: 1500 TABLET, EXTENDED RELEASE ORAL at 05:37

## 2018-01-01 RX ADMIN — BUPROPION HYDROCHLORIDE 75 MG: 75 TABLET, FILM COATED ORAL at 05:25

## 2018-01-01 RX ADMIN — BUPROPION HYDROCHLORIDE 150 MG: 150 TABLET, EXTENDED RELEASE ORAL at 19:44

## 2018-01-01 RX ADMIN — HEPARIN SODIUM 5000 UNITS: 5000 INJECTION, SOLUTION INTRAVENOUS; SUBCUTANEOUS at 14:37

## 2018-01-01 RX ADMIN — GABAPENTIN 300 MG: 300 CAPSULE ORAL at 14:17

## 2018-01-01 RX ADMIN — GABAPENTIN 300 MG: 300 CAPSULE ORAL at 05:38

## 2018-01-01 RX ADMIN — HEPARIN SODIUM 5000 UNITS: 5000 INJECTION, SOLUTION INTRAVENOUS; SUBCUTANEOUS at 15:13

## 2018-01-01 RX ADMIN — OXYCODONE HYDROCHLORIDE 5 MG: 5 TABLET ORAL at 08:24

## 2018-01-01 RX ADMIN — BUPROPION HYDROCHLORIDE 75 MG: 75 TABLET, FILM COATED ORAL at 18:08

## 2018-01-01 RX ADMIN — HEPARIN SODIUM 5000 UNITS: 5000 INJECTION, SOLUTION INTRAVENOUS; SUBCUTANEOUS at 16:29

## 2018-01-01 RX ADMIN — BUPROPION HYDROCHLORIDE 75 MG: 75 TABLET, FILM COATED ORAL at 16:32

## 2018-01-01 RX ADMIN — SODIUM CHLORIDE: 9 INJECTION, SOLUTION INTRAVENOUS at 02:08

## 2018-01-01 RX ADMIN — OXYCODONE HYDROCHLORIDE 10 MG: 10 TABLET ORAL at 06:02

## 2018-01-01 RX ADMIN — CEFTRIAXONE SODIUM 1 G: 1 INJECTION, POWDER, FOR SOLUTION INTRAMUSCULAR; INTRAVENOUS at 14:35

## 2018-01-01 RX ADMIN — GABAPENTIN 300 MG: 300 CAPSULE ORAL at 20:05

## 2018-01-01 RX ADMIN — POLYETHYLENE GLYCOL 3350, SODIUM SULFATE, SODIUM CHLORIDE, POTASSIUM CHLORIDE, ASCORBIC ACID, SODIUM ASCORBATE 100 G: KIT at 02:07

## 2018-01-01 RX ADMIN — MORPHINE SULFATE 30 MG: 30 TABLET, EXTENDED RELEASE ORAL at 20:07

## 2018-01-01 RX ADMIN — HYDROMORPHONE HYDROCHLORIDE 0.25 MG: 10 INJECTION, SOLUTION INTRAMUSCULAR; INTRAVENOUS; SUBCUTANEOUS at 07:50

## 2018-01-01 RX ADMIN — BUPROPION HYDROCHLORIDE 150 MG: 150 TABLET, EXTENDED RELEASE ORAL at 11:40

## 2018-01-01 RX ADMIN — BUPROPION HYDROCHLORIDE 150 MG: 150 TABLET, EXTENDED RELEASE ORAL at 08:15

## 2018-01-01 RX ADMIN — GABAPENTIN 300 MG: 300 CAPSULE ORAL at 17:20

## 2018-01-01 RX ADMIN — GABAPENTIN 300 MG: 300 CAPSULE ORAL at 02:12

## 2018-01-01 RX ADMIN — OXYCODONE HYDROCHLORIDE 10 MG: 10 TABLET ORAL at 19:04

## 2018-01-01 RX ADMIN — ONDANSETRON 4 MG: 4 TABLET, ORALLY DISINTEGRATING ORAL at 13:15

## 2018-01-01 RX ADMIN — ONDANSETRON HYDROCHLORIDE 4 MG: 2 INJECTION, SOLUTION INTRAMUSCULAR; INTRAVENOUS at 08:04

## 2018-01-01 RX ADMIN — MORPHINE SULFATE 15 MG: 15 TABLET ORAL at 12:37

## 2018-01-01 RX ADMIN — MORPHINE SULFATE 30 MG: 30 TABLET, EXTENDED RELEASE ORAL at 20:51

## 2018-01-01 RX ADMIN — SENNOSIDES AND DOCUSATE SODIUM 2 TABLET: 8.6; 5 TABLET ORAL at 07:50

## 2018-01-01 RX ADMIN — GABAPENTIN 300 MG: 300 CAPSULE ORAL at 08:04

## 2018-01-01 RX ADMIN — POTASSIUM CHLORIDE AND SODIUM CHLORIDE: 900; 150 INJECTION, SOLUTION INTRAVENOUS at 02:12

## 2018-01-01 RX ADMIN — CEFDINIR 300 MG: 300 CAPSULE ORAL at 19:51

## 2018-01-01 RX ADMIN — OXYCODONE HYDROCHLORIDE 5 MG: 5 TABLET ORAL at 05:11

## 2018-01-01 RX ADMIN — HEPARIN SODIUM 5000 UNITS: 5000 INJECTION, SOLUTION INTRAVENOUS; SUBCUTANEOUS at 23:29

## 2018-01-01 RX ADMIN — ACYCLOVIR 200 MG: 200 CAPSULE ORAL at 00:16

## 2018-01-01 RX ADMIN — POLYETHYLENE GLYCOL 3350 1 PACKET: 17 POWDER, FOR SOLUTION ORAL at 16:33

## 2018-01-01 RX ADMIN — OXYCODONE HYDROCHLORIDE 10 MG: 10 TABLET ORAL at 07:22

## 2018-01-01 RX ADMIN — BUPROPION HYDROCHLORIDE 75 MG: 75 TABLET, FILM COATED ORAL at 18:18

## 2018-01-01 RX ADMIN — BUPROPION HYDROCHLORIDE 75 MG: 75 TABLET, FILM COATED ORAL at 05:20

## 2018-01-01 RX ADMIN — POLYETHYLENE GLYCOL 3350, SODIUM SULFATE, SODIUM CHLORIDE, POTASSIUM CHLORIDE, ASCORBIC ACID, SODIUM ASCORBATE 100 G: KIT at 14:56

## 2018-01-01 RX ADMIN — ACYCLOVIR 200 MG: 200 CAPSULE ORAL at 18:18

## 2018-01-01 RX ADMIN — FUROSEMIDE 20 MG: 10 INJECTION, SOLUTION INTRAMUSCULAR; INTRAVENOUS at 05:20

## 2018-01-01 RX ADMIN — BUPROPION HYDROCHLORIDE 150 MG: 150 TABLET, EXTENDED RELEASE ORAL at 08:04

## 2018-01-01 RX ADMIN — SENNOSIDES AND DOCUSATE SODIUM 2 TABLET: 8.6; 5 TABLET ORAL at 07:45

## 2018-01-01 RX ADMIN — BUPROPION HYDROCHLORIDE 150 MG: 150 TABLET, EXTENDED RELEASE ORAL at 12:09

## 2018-01-01 RX ADMIN — POTASSIUM CHLORIDE 20 MEQ: 1500 TABLET, EXTENDED RELEASE ORAL at 08:03

## 2018-01-01 RX ADMIN — ACYCLOVIR 200 MG: 200 CAPSULE ORAL at 07:49

## 2018-01-01 RX ADMIN — MORPHINE SULFATE 30 MG: 30 TABLET, EXTENDED RELEASE ORAL at 20:18

## 2018-01-01 RX ADMIN — GABAPENTIN 300 MG: 300 CAPSULE ORAL at 16:19

## 2018-01-01 RX ADMIN — STANDARDIZED SENNA CONCENTRATE AND DOCUSATE SODIUM 2 TABLET: 8.6; 5 TABLET, FILM COATED ORAL at 21:56

## 2018-01-01 RX ADMIN — BUPROPION HYDROCHLORIDE 150 MG: 150 TABLET, EXTENDED RELEASE ORAL at 08:28

## 2018-01-01 RX ADMIN — MORPHINE SULFATE 30 MG: 30 TABLET, EXTENDED RELEASE ORAL at 21:06

## 2018-01-01 RX ADMIN — HEPARIN SODIUM 5000 UNITS: 5000 INJECTION, SOLUTION INTRAVENOUS; SUBCUTANEOUS at 20:41

## 2018-01-01 RX ADMIN — ONDANSETRON HYDROCHLORIDE 4 MG: 2 INJECTION, SOLUTION INTRAMUSCULAR; INTRAVENOUS at 16:53

## 2018-01-01 RX ADMIN — SENNOSIDES AND DOCUSATE SODIUM 2 TABLET: 8.6; 5 TABLET ORAL at 21:07

## 2018-01-01 RX ADMIN — MORPHINE SULFATE 30 MG: 30 TABLET, EXTENDED RELEASE ORAL at 08:33

## 2018-01-01 RX ADMIN — OXYCODONE HYDROCHLORIDE 5 MG: 5 TABLET ORAL at 01:25

## 2018-01-01 RX ADMIN — MORPHINE SULFATE 4 MG: 4 INJECTION INTRAVENOUS at 20:40

## 2018-01-01 RX ADMIN — STANDARDIZED SENNA CONCENTRATE AND DOCUSATE SODIUM 2 TABLET: 8.6; 5 TABLET, FILM COATED ORAL at 08:04

## 2018-01-01 RX ADMIN — ONDANSETRON 4 MG: 4 TABLET, ORALLY DISINTEGRATING ORAL at 15:07

## 2018-01-01 RX ADMIN — ACYCLOVIR 200 MG: 200 CAPSULE ORAL at 09:18

## 2018-01-01 RX ADMIN — GABAPENTIN 300 MG: 300 CAPSULE ORAL at 15:46

## 2018-01-01 RX ADMIN — METOCLOPRAMIDE 10 MG: 5 INJECTION, SOLUTION INTRAMUSCULAR; INTRAVENOUS at 21:21

## 2018-01-01 RX ADMIN — ENOXAPARIN SODIUM 40 MG: 100 INJECTION SUBCUTANEOUS at 07:50

## 2018-01-01 RX ADMIN — HEPARIN SODIUM 5000 UNITS: 5000 INJECTION, SOLUTION INTRAVENOUS; SUBCUTANEOUS at 20:17

## 2018-01-01 RX ADMIN — MORPHINE SULFATE 30 MG: 30 TABLET, EXTENDED RELEASE ORAL at 07:40

## 2018-01-01 RX ADMIN — BUPROPION HYDROCHLORIDE 150 MG: 150 TABLET, EXTENDED RELEASE ORAL at 11:45

## 2018-01-01 RX ADMIN — SENNOSIDES AND DOCUSATE SODIUM 2 TABLET: 8.6; 5 TABLET ORAL at 08:02

## 2018-01-01 RX ADMIN — POLYETHYLENE GLYCOL 3350 1 PACKET: 17 POWDER, FOR SOLUTION ORAL at 11:00

## 2018-01-01 RX ADMIN — GABAPENTIN 300 MG: 300 CAPSULE ORAL at 00:12

## 2018-01-01 RX ADMIN — ACYCLOVIR 200 MG: 200 CAPSULE ORAL at 05:47

## 2018-01-01 RX ADMIN — LEVOFLOXACIN 750 MG: 750 TABLET, FILM COATED ORAL at 08:20

## 2018-01-01 RX ADMIN — GABAPENTIN 300 MG: 300 CAPSULE ORAL at 07:40

## 2018-01-01 RX ADMIN — MORPHINE SULFATE 45 MG: 15 TABLET, EXTENDED RELEASE ORAL at 20:07

## 2018-01-01 RX ADMIN — BUPROPION HYDROCHLORIDE 150 MG: 150 TABLET, EXTENDED RELEASE ORAL at 19:51

## 2018-01-01 RX ADMIN — PIPERACILLIN SODIUM AND TAZOBACTAM SODIUM 3.38 G: 3; .375 INJECTION, POWDER, FOR SOLUTION INTRAVENOUS at 23:54

## 2018-01-01 RX ADMIN — POTASSIUM CHLORIDE AND SODIUM CHLORIDE: 900; 150 INJECTION, SOLUTION INTRAVENOUS at 05:38

## 2018-01-01 RX ADMIN — LEVOFLOXACIN 750 MG: 750 TABLET, FILM COATED ORAL at 05:37

## 2018-01-01 RX ADMIN — OXYCODONE HYDROCHLORIDE 5 MG: 5 TABLET ORAL at 15:41

## 2018-01-01 RX ADMIN — OXYCODONE HYDROCHLORIDE 10 MG: 10 TABLET ORAL at 21:26

## 2018-01-01 RX ADMIN — GABAPENTIN 300 MG: 300 CAPSULE ORAL at 00:06

## 2018-01-01 RX ADMIN — HEPARIN SODIUM 5000 UNITS: 5000 INJECTION, SOLUTION INTRAVENOUS; SUBCUTANEOUS at 05:51

## 2018-01-01 RX ADMIN — MORPHINE SULFATE 30 MG: 30 TABLET, EXTENDED RELEASE ORAL at 10:23

## 2018-01-01 RX ADMIN — MORPHINE SULFATE 30 MG: 30 TABLET, EXTENDED RELEASE ORAL at 09:18

## 2018-01-01 RX ADMIN — GABAPENTIN 300 MG: 300 CAPSULE ORAL at 05:36

## 2018-01-01 RX ADMIN — PIPERACILLIN SODIUM AND TAZOBACTAM SODIUM 3.38 G: 3; .375 INJECTION, POWDER, FOR SOLUTION INTRAVENOUS at 00:36

## 2018-01-01 RX ADMIN — SENNOSIDES AND DOCUSATE SODIUM 2 TABLET: 8.6; 5 TABLET ORAL at 19:49

## 2018-01-01 RX ADMIN — ENOXAPARIN SODIUM 40 MG: 100 INJECTION SUBCUTANEOUS at 10:32

## 2018-01-01 RX ADMIN — OXYCODONE HYDROCHLORIDE 10 MG: 10 TABLET ORAL at 14:18

## 2018-01-01 RX ADMIN — PIPERACILLIN SODIUM AND TAZOBACTAM SODIUM 3.38 G: 3; .375 INJECTION, POWDER, FOR SOLUTION INTRAVENOUS at 05:22

## 2018-01-01 RX ADMIN — MORPHINE SULFATE 30 MG: 30 TABLET, EXTENDED RELEASE ORAL at 08:21

## 2018-01-01 RX ADMIN — ONDANSETRON 4 MG: 4 TABLET, ORALLY DISINTEGRATING ORAL at 10:20

## 2018-01-01 RX ADMIN — ONDANSETRON 4 MG: 4 TABLET, ORALLY DISINTEGRATING ORAL at 14:18

## 2018-01-01 RX ADMIN — PROCHLORPERAZINE EDISYLATE 10 MG: 5 INJECTION INTRAMUSCULAR; INTRAVENOUS at 07:03

## 2018-01-01 RX ADMIN — OXYCODONE HYDROCHLORIDE 5 MG: 5 TABLET ORAL at 12:49

## 2018-01-01 RX ADMIN — BUPROPION HYDROCHLORIDE 75 MG: 75 TABLET, FILM COATED ORAL at 17:30

## 2018-01-01 RX ADMIN — OXYCODONE HYDROCHLORIDE 10 MG: 10 TABLET ORAL at 10:05

## 2018-01-01 RX ADMIN — PIPERACILLIN SODIUM AND TAZOBACTAM SODIUM 3.38 G: 3; .375 INJECTION, POWDER, FOR SOLUTION INTRAVENOUS at 12:49

## 2018-01-01 RX ADMIN — FUROSEMIDE 20 MG: 10 INJECTION, SOLUTION INTRAMUSCULAR; INTRAVENOUS at 18:33

## 2018-01-01 RX ADMIN — STANDARDIZED SENNA CONCENTRATE AND DOCUSATE SODIUM 2 TABLET: 8.6; 5 TABLET, FILM COATED ORAL at 18:16

## 2018-01-01 RX ADMIN — OXYCODONE HYDROCHLORIDE 5 MG: 5 TABLET ORAL at 10:46

## 2018-01-01 RX ADMIN — HYDROMORPHONE HYDROCHLORIDE 0.5 MG: 10 INJECTION, SOLUTION INTRAMUSCULAR; INTRAVENOUS; SUBCUTANEOUS at 22:04

## 2018-01-01 RX ADMIN — ACYCLOVIR 200 MG: 200 CAPSULE ORAL at 21:12

## 2018-01-01 RX ADMIN — HYDROMORPHONE HYDROCHLORIDE 0.25 MG: 10 INJECTION, SOLUTION INTRAMUSCULAR; INTRAVENOUS; SUBCUTANEOUS at 12:53

## 2018-01-01 RX ADMIN — ENOXAPARIN SODIUM 40 MG: 100 INJECTION SUBCUTANEOUS at 08:33

## 2018-01-01 RX ADMIN — BUPROPION HYDROCHLORIDE 150 MG: 150 TABLET, EXTENDED RELEASE ORAL at 20:41

## 2018-01-01 RX ADMIN — BUPROPION HYDROCHLORIDE 150 MG: 150 TABLET, EXTENDED RELEASE ORAL at 21:05

## 2018-01-01 RX ADMIN — ONDANSETRON 4 MG: 4 TABLET, ORALLY DISINTEGRATING ORAL at 00:18

## 2018-01-01 RX ADMIN — STANDARDIZED SENNA CONCENTRATE AND DOCUSATE SODIUM 2 TABLET: 8.6; 5 TABLET, FILM COATED ORAL at 11:40

## 2018-01-01 RX ADMIN — SIMETHICONE 40 MG: 20 SUSPENSION/ DROPS ORAL at 17:06

## 2018-01-01 RX ADMIN — METOCLOPRAMIDE 10 MG: 5 INJECTION, SOLUTION INTRAMUSCULAR; INTRAVENOUS at 04:14

## 2018-01-01 RX ADMIN — GADODIAMIDE 15 ML: 287 INJECTION INTRAVENOUS at 08:50

## 2018-01-01 RX ADMIN — BUPROPION HYDROCHLORIDE 75 MG: 75 TABLET, FILM COATED ORAL at 05:10

## 2018-01-01 RX ADMIN — HEPARIN 500 UNITS: 100 SYRINGE at 09:19

## 2018-01-01 RX ADMIN — GABAPENTIN 300 MG: 300 CAPSULE ORAL at 00:16

## 2018-01-01 RX ADMIN — STANDARDIZED SENNA CONCENTRATE AND DOCUSATE SODIUM 2 TABLET: 8.6; 5 TABLET, FILM COATED ORAL at 09:11

## 2018-01-01 RX ADMIN — MORPHINE SULFATE 30 MG: 30 TABLET, EXTENDED RELEASE ORAL at 10:20

## 2018-01-01 RX ADMIN — SENNOSIDES AND DOCUSATE SODIUM 2 TABLET: 8.6; 5 TABLET ORAL at 07:49

## 2018-01-01 RX ADMIN — ONDANSETRON HYDROCHLORIDE 4 MG: 2 INJECTION, SOLUTION INTRAMUSCULAR; INTRAVENOUS at 18:55

## 2018-01-01 RX ADMIN — MORPHINE SULFATE 30 MG: 30 TABLET, EXTENDED RELEASE ORAL at 11:40

## 2018-01-01 RX ADMIN — BUPROPION HYDROCHLORIDE 150 MG: 150 TABLET, EXTENDED RELEASE ORAL at 20:05

## 2018-01-01 RX ADMIN — PROCHLORPERAZINE EDISYLATE 5 MG: 5 INJECTION INTRAMUSCULAR; INTRAVENOUS at 10:41

## 2018-01-01 RX ADMIN — GABAPENTIN 300 MG: 300 CAPSULE ORAL at 14:41

## 2018-01-01 RX ADMIN — STANDARDIZED SENNA CONCENTRATE AND DOCUSATE SODIUM 2 TABLET: 8.6; 5 TABLET, FILM COATED ORAL at 06:32

## 2018-01-01 RX ADMIN — MORPHINE SULFATE 60 MG: 60 TABLET, FILM COATED, EXTENDED RELEASE ORAL at 05:04

## 2018-01-01 RX ADMIN — BUPROPION HYDROCHLORIDE 150 MG: 150 TABLET, EXTENDED RELEASE ORAL at 09:02

## 2018-01-01 RX ADMIN — HYDROMORPHONE HYDROCHLORIDE 4 MG: 4 TABLET ORAL at 13:09

## 2018-01-01 RX ADMIN — OXYCODONE HYDROCHLORIDE 5 MG: 5 TABLET ORAL at 14:28

## 2018-01-01 RX ADMIN — GABAPENTIN 300 MG: 300 CAPSULE ORAL at 01:30

## 2018-01-01 RX ADMIN — SENNOSIDES AND DOCUSATE SODIUM 2 TABLET: 8.6; 5 TABLET ORAL at 17:30

## 2018-01-01 RX ADMIN — FUROSEMIDE 40 MG: 10 INJECTION, SOLUTION INTRAMUSCULAR; INTRAVENOUS at 15:03

## 2018-01-01 RX ADMIN — ENOXAPARIN SODIUM 40 MG: 100 INJECTION SUBCUTANEOUS at 05:35

## 2018-01-01 RX ADMIN — BUPROPION HYDROCHLORIDE 75 MG: 75 TABLET, FILM COATED ORAL at 20:10

## 2018-01-01 RX ADMIN — FENTANYL CITRATE 50 MCG: 50 INJECTION, SOLUTION INTRAMUSCULAR; INTRAVENOUS at 15:13

## 2018-01-01 RX ADMIN — GABAPENTIN 300 MG: 300 CAPSULE ORAL at 05:16

## 2018-01-01 RX ADMIN — ACYCLOVIR 200 MG: 200 CAPSULE ORAL at 22:28

## 2018-01-01 RX ADMIN — ALBUMIN (HUMAN) 12.5 G: 2.5 SOLUTION INTRAVENOUS at 21:03

## 2018-01-01 RX ADMIN — ACYCLOVIR 200 MG: 200 CAPSULE ORAL at 08:43

## 2018-01-01 RX ADMIN — PIPERACILLIN AND TAZOBACTAM 4.5 G: 4; .5 INJECTION, POWDER, LYOPHILIZED, FOR SOLUTION INTRAVENOUS; PARENTERAL at 21:03

## 2018-01-01 RX ADMIN — OXYCODONE HYDROCHLORIDE 5 MG: 5 TABLET ORAL at 07:40

## 2018-01-01 RX ADMIN — SENNOSIDES AND DOCUSATE SODIUM 2 TABLET: 8.6; 5 TABLET ORAL at 21:03

## 2018-01-01 RX ADMIN — GABAPENTIN 300 MG: 300 CAPSULE ORAL at 01:53

## 2018-01-01 RX ADMIN — GABAPENTIN 300 MG: 300 CAPSULE ORAL at 13:09

## 2018-01-01 RX ADMIN — OXYCODONE HYDROCHLORIDE 5 MG: 5 TABLET ORAL at 10:19

## 2018-01-01 RX ADMIN — FENTANYL CITRATE 25 MCG: 50 INJECTION, SOLUTION INTRAMUSCULAR; INTRAVENOUS at 09:17

## 2018-01-01 RX ADMIN — ENOXAPARIN SODIUM 40 MG: 100 INJECTION SUBCUTANEOUS at 05:50

## 2018-01-01 RX ADMIN — GABAPENTIN 300 MG: 300 CAPSULE ORAL at 00:25

## 2018-01-01 RX ADMIN — OXYCODONE HYDROCHLORIDE 5 MG: 5 TABLET ORAL at 19:12

## 2018-01-01 RX ADMIN — HEPARIN SODIUM 5000 UNITS: 5000 INJECTION, SOLUTION INTRAVENOUS; SUBCUTANEOUS at 06:07

## 2018-01-01 RX ADMIN — METOCLOPRAMIDE 10 MG: 5 INJECTION, SOLUTION INTRAMUSCULAR; INTRAVENOUS at 14:28

## 2018-01-01 RX ADMIN — OXYCODONE HYDROCHLORIDE 5 MG: 5 TABLET ORAL at 08:47

## 2018-01-01 RX ADMIN — SODIUM CHLORIDE 1000 ML: 9 INJECTION, SOLUTION INTRAVENOUS at 02:45

## 2018-01-01 RX ADMIN — PIPERACILLIN SODIUM AND TAZOBACTAM SODIUM 3.38 G: 3; .375 INJECTION, POWDER, FOR SOLUTION INTRAVENOUS at 20:18

## 2018-01-01 SDOH — ECONOMIC STABILITY: HOUSING INSECURITY: UNSAFE COOKING RANGE AREA: 0

## 2018-01-01 SDOH — ECONOMIC STABILITY: HOUSING INSECURITY
HOME SAFETY: YOUR OXYGEN. THIS INCLUDES CLEANING PRODUCTS THAT CONTAIN OIL, GREASE, ALCOHOL, OR OTHER LIQUIDS THAT CAN BURN.  KEEP OXYGEN AWAY FROM ANY OPEN FLAMES OR BASEBOARD HEATING SYSTEMS.  DO NOT USE VASELINE OR OTHER PETROLEUM-BASED CREAMS AND LOTIONS ON

## 2018-01-01 SDOH — ECONOMIC STABILITY: GENERAL

## 2018-01-01 SDOH — ECONOMIC STABILITY: HOUSING INSECURITY
HOME SAFETY: THE FOLLOWING OXYGEN SAFETY EDUCATION WAS PROVIDED:  NO OPEN FLAMES.  POSTED  'OXYGEN IN USE' SIGN ON EXTERNAL DOOR  OR WINDOW OF HOME.  NEED FOR A  FUNCTIONAL FIRE EXTINGUISHER AND SMOKE DETECTORS IN HOME.  KEEP LIQUIDS THAT MAY CATCH FIRE AWAY FROM

## 2018-01-01 SDOH — ECONOMIC STABILITY: HOUSING INSECURITY: UNSAFE APPLIANCES: 0

## 2018-01-01 SDOH — ECONOMIC STABILITY: HOUSING INSECURITY: HOME SAFETY: YOUR FACE OR UPPER PART OF YOUR BODY.  AVOID TRIPPING OVER OXYGEN TUBING.

## 2018-01-01 ASSESSMENT — ENCOUNTER SYMPTOMS
MYALGIAS: 0
PND: 0
NERVOUS/ANXIOUS: 0
HEADACHES: 0
DIZZINESS: 0
GASTROINTESTINAL NEGATIVE: 1
CONSTIPATION: 0
FEVER: 0
DEPRESSION: 0
HEARTBURN: 0
DIZZINESS: 0
SENSORY CHANGE: 0
CONSTIPATION: 0
MYALGIAS: 0
TINGLING: 0
FEVER: 0
HEARTBURN: 0
DIZZINESS: 0
WEAKNESS: 0
VOMITING: 0
FEVER: 0
DEPRESSION: 0
ABDOMINAL PAIN: 1
NAUSEA: 1
DEPRESSION: 0
DIARRHEA: 0
BLURRED VISION: 0
SPEECH CHANGE: 0
HEADACHES: 0
DEPRESSION: 0
PALPITATIONS: 0
DOUBLE VISION: 0
CLAUDICATION: 0
INSOMNIA: 0
SPEECH CHANGE: 0
MYALGIAS: 0
SPUTUM PRODUCTION: 0
MYALGIAS: 0
WEAKNESS: 1
FEVER: 0
HEADACHES: 0
DIZZINESS: 0
HEMOPTYSIS: 0
COUGH: 0
SENSORY CHANGE: 0
CONSTIPATION: 0
HEADACHES: 0
FEVER: 0
SHORTNESS OF BREATH: 0
HEARTBURN: 0
MYALGIAS: 0
ABDOMINAL PAIN: 1
HEARTBURN: 0
DIZZINESS: 0
CONSTIPATION: 0
SENSORY CHANGE: 0
ABDOMINAL PAIN: 1
INSOMNIA: 0
NERVOUS/ANXIOUS: 0
WEAKNESS: 1
PHOTOPHOBIA: 0
BLURRED VISION: 0
WEAKNESS: 0
COUGH: 0
BLURRED VISION: 0
MYALGIAS: 0
CHILLS: 0
MYALGIAS: 1
BRUISES/BLEEDS EASILY: 0
BLURRED VISION: 0
BLURRED VISION: 0
FEVER: 0
HEMOPTYSIS: 0
HEADACHES: 0
PALPITATIONS: 0
STOOL FREQUENCY: LESS THAN DAILY
ABDOMINAL PAIN: 1
WEAKNESS: 1
WEAKNESS: 0
HEADACHES: 0
LAST BOWEL MOVEMENT: 64848
SPEECH CHANGE: 0
DEPRESSION: 0
PHOTOPHOBIA: 0
GASTROINTESTINAL NEGATIVE: 1
HEADACHES: 0
CHILLS: 0
DIARRHEA: 0
MYALGIAS: 1
EYES NEGATIVE: 1
CHILLS: 0
PHOTOPHOBIA: 0
SINUS PAIN: 0
VOMITING: 0
HEADACHES: 0
NERVOUS/ANXIOUS: 1
HEADACHES: 0
COUGH: 0
DEPRESSION: 0
WEAKNESS: 1
ABDOMINAL PAIN: 1
HEARTBURN: 0
WEAKNESS: 1
HEARTBURN: 0
EYES NEGATIVE: 1
ABDOMINAL PAIN: 0
HEARTBURN: 0
FEVER: 1
VOMITING: 0
NAUSEA: 0
ABDOMINAL PAIN: 1
WEAKNESS: 1
SORE THROAT: 0
ABDOMINAL PAIN: 1
FEVER: 0
NAUSEA: 0
FEVER: 1
BLURRED VISION: 0
COUGH: 0
BLURRED VISION: 0
CHILLS: 0
INSOMNIA: 0
INSOMNIA: 0
HEARTBURN: 0
MYALGIAS: 0
BLURRED VISION: 0
INSOMNIA: 0
NAUSEA: 1
DIARRHEA: 0
INSOMNIA: 0
CLAUDICATION: 0
COUGH: 0
DIZZINESS: 0
NAUSEA: 0
HEARTBURN: 0
WEAKNESS: 1
COUGH: 0
CLAUDICATION: 0
LAST BOWEL MOVEMENT: 64853
SHORTNESS OF BREATH: 1
SORE THROAT: 0
NERVOUS/ANXIOUS: 0
ABDOMINAL PAIN: 1
INSOMNIA: 0
NEUROLOGICAL NEGATIVE: 1
NERVOUS/ANXIOUS: 0
SPUTUM PRODUCTION: 0
ABDOMINAL PAIN: 1
SHORTNESS OF BREATH: 0
SHORTNESS OF BREATH: 0
DIZZINESS: 0
DIZZINESS: 0
SENSORY CHANGE: 0
VOMITING: 0
ABDOMINAL PAIN: 0
COUGH: 0
FEVER: 0
SHORTNESS OF BREATH: 0
COUGH: 0
DIZZINESS: 0
CHILLS: 0
VOMITING: 0
VOMITING: 1
VOMITING: 0
MYALGIAS: 0
HEARTBURN: 0
CHILLS: 0
INSOMNIA: 0
HEARTBURN: 0
CONSTITUTIONAL NEGATIVE: 1
WEAKNESS: 0
FEVER: 0
DEPRESSION: 1
NEUROLOGICAL NEGATIVE: 1
SPEECH CHANGE: 0
FEVER: 1
SENSORY CHANGE: 0
SHORTNESS OF BREATH: 0
HEARTBURN: 0
RESPIRATORY NEGATIVE: 1
SHORTNESS OF BREATH: 0
INSOMNIA: 0
DIZZINESS: 0
BLURRED VISION: 0
FEVER: 0
INSOMNIA: 0
WEAKNESS: 1
DIZZINESS: 0
VOMITING: 0
SHORTNESS OF BREATH: 0
COUGH: 0
CLAUDICATION: 0
DIZZINESS: 0
DIAPHORESIS: 0
HEADACHES: 0
FEVER: 0
MYALGIAS: 1
WEAKNESS: 0
MYALGIAS: 1
WEAKNESS: 0
VOMITING: 0
NAUSEA: 0
DIZZINESS: 0
SENSORY CHANGE: 0
BLURRED VISION: 0
ABDOMINAL PAIN: 1
HEARTBURN: 0
VOMITING: 0
PHOTOPHOBIA: 0
COUGH: 0
DIZZINESS: 0
NERVOUS/ANXIOUS: 1
NAUSEA: 0
ABDOMINAL PAIN: 0
CHILLS: 0
DOUBLE VISION: 0
HEADACHES: 0
SENSORY CHANGE: 0
CLAUDICATION: 0
CLAUDICATION: 0
CONSTIPATION: 1
PHOTOPHOBIA: 0
CHILLS: 0
ABDOMINAL PAIN: 1
SHORTNESS OF BREATH: 0
CONSTIPATION: 0
WEAKNESS: 0
ABDOMINAL PAIN: 1
CLAUDICATION: 0
NERVOUS/ANXIOUS: 0
BRUISES/BLEEDS EASILY: 0
DIZZINESS: 0
DIZZINESS: 0
NERVOUS/ANXIOUS: 0
CONSTIPATION: 0
NERVOUS/ANXIOUS: 0
BLOOD IN STOOL: 0
SHORTNESS OF BREATH: 0
SPUTUM PRODUCTION: 0
HEARTBURN: 0
FEVER: 0
DEPRESSION: 0
NAUSEA: 0
CONSTIPATION: 0
NERVOUS/ANXIOUS: 0
HEADACHES: 0
SENSORY CHANGE: 0
MYALGIAS: 0
BRUISES/BLEEDS EASILY: 0
DEPRESSION: 0
SPEECH CHANGE: 0
INSOMNIA: 0
NEUROLOGICAL NEGATIVE: 1
ABDOMINAL PAIN: 0
SHORTNESS OF BREATH: 0
SHORTNESS OF BREATH: 0
MYALGIAS: 0
CLAUDICATION: 0
COUGH: 0
BACK PAIN: 0
SPEECH CHANGE: 0
DIARRHEA: 0
SHORTNESS OF BREATH: 0
DRY SKIN: 1
PHOTOPHOBIA: 0
CONSTIPATION: 0
WEAKNESS: 0
DIZZINESS: 0
BLURRED VISION: 0
ABDOMINAL PAIN: 1
WEAKNESS: 0
DRY SKIN: 1
DIZZINESS: 0
SHORTNESS OF BREATH: 0
FEVER: 1
CONSTITUTIONAL NEGATIVE: 1
WEAKNESS: 1
CHILLS: 0
SORE THROAT: 0
COUGH: 0
SPUTUM PRODUCTION: 0
BLURRED VISION: 0
FATIGUES EASILY: 1
NECK PAIN: 1
SPUTUM PRODUCTION: 0
HEARTBURN: 0
NAUSEA: 1
VOMITING: 0
BLURRED VISION: 0
HEARTBURN: 0
SPEECH CHANGE: 0
CONSTIPATION: 0
VOMITING: 0
NERVOUS/ANXIOUS: 0
DIARRHEA: 0
BLURRED VISION: 0
BOWEL INCONTINENCE: 1
SPUTUM PRODUCTION: 0
DEPRESSION: 0
FEVER: 0
CLAUDICATION: 0
BRUISES/BLEEDS EASILY: 0
BLURRED VISION: 0
VOMITING: 0
MUSCULOSKELETAL NEGATIVE: 1
FOCAL WEAKNESS: 0
DIZZINESS: 0
PHOTOPHOBIA: 0
DIARRHEA: 0
DEPRESSION: 0
SENSORY CHANGE: 0
HEMOPTYSIS: 0
SPEECH CHANGE: 0
MYALGIAS: 1
WEAKNESS: 0
COUGH: 0
PHOTOPHOBIA: 0
SORE THROAT: 0
SOMNOLENCE: 1
HEADACHES: 0
SINUS PAIN: 0
HEARTBURN: 0
NERVOUS/ANXIOUS: 0
CHILLS: 0
SPEECH CHANGE: 0
NAUSEA: 1
PHOTOPHOBIA: 0
BLURRED VISION: 0
FEVER: 1
MYALGIAS: 0
DIARRHEA: 0
FATIGUES EASILY: 1
DIARRHEA: 0
FLANK PAIN: 0
ABDOMINAL PAIN: 1
NAUSEA: 0
BLURRED VISION: 0
SPEECH CHANGE: 0
COUGH: 0
CARDIOVASCULAR NEGATIVE: 1
ABDOMINAL PAIN: 1
DEPRESSION: 0
SENSORY CHANGE: 0
FEVER: 0
VOMITING: 0
VOMITING: 0
DEPRESSION: 0
ORTHOPNEA: 0
SOMNOLENCE: 1
PHOTOPHOBIA: 0
COUGH: 0
ABDOMINAL PAIN: 1
INSOMNIA: 0
HEADACHES: 0
NAUSEA: 0
NECK PAIN: 0
ABDOMINAL PAIN: 1
CLAUDICATION: 0
FEVER: 0
COUGH: 0
NECK PAIN: 0
SHORTNESS OF BREATH: 0
BLURRED VISION: 0
ABDOMINAL PAIN: 1
WEAKNESS: 1
DIARRHEA: 0
SPEECH CHANGE: 0
MYALGIAS: 0
ORTHOPNEA: 0
PHOTOPHOBIA: 0
COUGH: 0
SHORTNESS OF BREATH: 0
HEARTBURN: 0
COUGH: 0
CLAUDICATION: 0
DIZZINESS: 0
BLURRED VISION: 0
SPEECH CHANGE: 0
DIARRHEA: 1
DOUBLE VISION: 0
SORE THROAT: 0
NERVOUS/ANXIOUS: 0
INSOMNIA: 0
VOMITING: 1
HEARTBURN: 0
COUGH: 0
VOMITING: 0
BLURRED VISION: 0
FEVER: 0
PALPITATIONS: 0
DEPRESSION: 0
CONSTIPATION: 0
BACK PAIN: 1
INSOMNIA: 0
CONSTIPATION: 0
ABDOMINAL PAIN: 1
CLAUDICATION: 0
CLAUDICATION: 0
BLURRED VISION: 0
NERVOUS/ANXIOUS: 0
DEPRESSION: 0
VOMITING: 0
CONSTITUTIONAL NEGATIVE: 1
COUGH: 0
HEARTBURN: 0
PALPITATIONS: 0
COUGH: 0
MYALGIAS: 0
MYALGIAS: 0
SORE THROAT: 0
HEARTBURN: 0
CLAUDICATION: 0
DEPRESSION: 0
CONSTIPATION: 0
FEVER: 0
SPEECH CHANGE: 0
MYALGIAS: 0
CONSTIPATION: 0
DIZZINESS: 0
NECK PAIN: 0
INSOMNIA: 0
CHILLS: 1
NAUSEA: 1
COUGH: 0
DEPRESSION: 1
INSOMNIA: 0
DIZZINESS: 0
CHILLS: 0
CLAUDICATION: 0
CHILLS: 0
PHOTOPHOBIA: 0
DIARRHEA: 0
BOWEL INCONTINENCE: 1
HEADACHES: 0
FEVER: 0
VOMITING: 1
WEAKNESS: 0
COUGH: 0
DEPRESSION: 0
BLOOD IN STOOL: 0
PALPITATIONS: 0
SENSORY CHANGE: 0
WEAKNESS: 1
ABDOMINAL PAIN: 1
COUGH: 0
MUSCULOSKELETAL NEGATIVE: 1
DIARRHEA: 1
ABDOMINAL PAIN: 1
SHORTNESS OF BREATH: 0
SHORTNESS OF BREATH: 0
CLAUDICATION: 0
FATIGUES EASILY: 1
CHILLS: 0
MYALGIAS: 0
FEVER: 0
COUGH: 0
MYALGIAS: 0
BOWEL INCONTINENCE: 1
SPEECH CHANGE: 0
DEPRESSION: 0
CLAUDICATION: 0
HEARTBURN: 0
DEPRESSION: 0
DIARRHEA: 0
DIZZINESS: 0
BRUISES/BLEEDS EASILY: 0
NAUSEA: 0
NAUSEA: 0
WEAKNESS: 0
STOOL FREQUENCY: LESS THAN DAILY
NAUSEA: 0
DEPRESSION: 0
WEAKNESS: 1
CLAUDICATION: 0
HEADACHES: 0
SPUTUM PRODUCTION: 0
PHOTOPHOBIA: 0
NAUSEA: 0
BLURRED VISION: 0
NAUSEA: 0
HEADACHES: 0
INSOMNIA: 0
PHOTOPHOBIA: 0
DIZZINESS: 0
MUSCULOSKELETAL NEGATIVE: 1
SPEECH CHANGE: 0
CHILLS: 0
DIZZINESS: 0
SORE THROAT: 0
POLYDIPSIA: 0
NERVOUS/ANXIOUS: 0
BLURRED VISION: 0
SENSORY CHANGE: 0
DIZZINESS: 0
MYALGIAS: 0
SENSORY CHANGE: 0
MYALGIAS: 0
STOOL FREQUENCY: LESS THAN DAILY
MYALGIAS: 0
HEARTBURN: 0
NERVOUS/ANXIOUS: 0
CHILLS: 0
WEAKNESS: 0
BLURRED VISION: 0
DIARRHEA: 1
BLURRED VISION: 0
NAUSEA: 1
CARDIOVASCULAR NEGATIVE: 1
CHILLS: 0
CONSTIPATION: 0
MYALGIAS: 0
BRUISES/BLEEDS EASILY: 0
PHOTOPHOBIA: 0
BACK PAIN: 1
INSOMNIA: 0
TINGLING: 0
MYALGIAS: 0
NERVOUS/ANXIOUS: 0
DIZZINESS: 0
EYES NEGATIVE: 1
HEMOPTYSIS: 0
SHORTNESS OF BREATH: 0
DEPRESSION: 0
DEPRESSION: 0
MYALGIAS: 0
HEARTBURN: 0
BACK PAIN: 0
GASTROINTESTINAL NEGATIVE: 1
FEVER: 0
DIARRHEA: 0
MYALGIAS: 0
HEADACHES: 0
NECK PAIN: 0
CONSTIPATION: 0
VOMITING: 0
FEVER: 0
HALLUCINATIONS: 1
COUGH: 0
SENSORY CHANGE: 0
CHILLS: 0
SORE THROAT: 0
SENSORY CHANGE: 0
NAUSEA: 1
SHORTNESS OF BREATH: 0
MYALGIAS: 0
RESPIRATORY NEGATIVE: 1
CHILLS: 0
RESPIRATORY NEGATIVE: 1
DIARRHEA: 0
WEAKNESS: 0
SHORTNESS OF BREATH: 0
NERVOUS/ANXIOUS: 0
HEMOPTYSIS: 0
COUGH: 0
NERVOUS/ANXIOUS: 0
CHILLS: 0
DIZZINESS: 0
HEADACHES: 0
PHOTOPHOBIA: 0
FEVER: 0
CONSTIPATION: 0
CARDIOVASCULAR NEGATIVE: 1
DIARRHEA: 0
WEIGHT LOSS: 1
DIARRHEA: 0
ABDOMINAL PAIN: 1
DIARRHEA: 0
COUGH: 0
CHILLS: 0
SPEECH CHANGE: 0
SENSORY CHANGE: 0
MYALGIAS: 0
SHORTNESS OF BREATH: 0
DEPRESSION: 0
PHOTOPHOBIA: 0
WEAKNESS: 1
HEADACHES: 0
LOWER EXTREMITY EDEMA: 1
ABDOMINAL PAIN: 1
COUGH: 0
DIZZINESS: 0
BRUISES/BLEEDS EASILY: 0
ABDOMINAL PAIN: 1
ABDOMINAL PAIN: 1
DEPRESSION: 0
CONSTIPATION: 0
DIZZINESS: 0
NAUSEA: 0
HEMOPTYSIS: 0
HEARTBURN: 0
VOMITING: 0
DIARRHEA: 0
SENSORY CHANGE: 0
WEAKNESS: 1
HALLUCINATIONS: 1
TINGLING: 0
CONSTIPATION: 0
CHILLS: 0
HEADACHES: 0
MYALGIAS: 0
SPUTUM PRODUCTION: 0
WHEEZING: 0
HEARTBURN: 0
DIARRHEA: 0
DEPRESSION: 0
CHILLS: 0
SHORTNESS OF BREATH: 0
DIZZINESS: 0
NAUSEA: 0
DEPRESSION: 0
HEARTBURN: 0
CHILLS: 0
ABDOMINAL PAIN: 1
CHILLS: 0
HEARTBURN: 0
HEARTBURN: 0
FEVER: 0
COUGH: 0
PALPITATIONS: 0
HEADACHES: 0
FEVER: 0
SPEECH CHANGE: 0
WEAKNESS: 0
SPEECH CHANGE: 0
NAUSEA: 0
PHOTOPHOBIA: 0
SORE THROAT: 0
COUGH: 0
SENSORY CHANGE: 0
BRUISES/BLEEDS EASILY: 0
DIAPHORESIS: 1
SENSORY CHANGE: 0
FEVER: 0
CONSTIPATION: 0
BLURRED VISION: 0
LAST BOWEL MOVEMENT: 64848
NAUSEA: 0
DEPRESSION: 0
HEADACHES: 0
NAUSEA: 1
NECK PAIN: 0
PALPITATIONS: 0
HEADACHES: 0
BRUISES/BLEEDS EASILY: 0
CLAUDICATION: 0
VOMITING: 0
DIZZINESS: 0
BRUISES/BLEEDS EASILY: 0
PHOTOPHOBIA: 0
PHOTOPHOBIA: 0
SPUTUM PRODUCTION: 0
ABDOMINAL PAIN: 1
INSOMNIA: 0

## 2018-01-01 ASSESSMENT — PAIN SCALES - GENERAL
PAINLEVEL_OUTOF10: ASSUMED PAIN PRESENT
PAINLEVEL_OUTOF10: 6
PAINLEVEL_OUTOF10: 8
PAINLEVEL_OUTOF10: 7
PAINLEVEL_OUTOF10: 4
PAINLEVEL_OUTOF10: 2
PAINLEVEL_OUTOF10: 2
PAINLEVEL_OUTOF10: 3
PAINLEVEL_OUTOF10: 3
PAINLEVEL_OUTOF10: 4
PAINLEVEL_OUTOF10: 4
PAINLEVEL_OUTOF10: 0
PAINLEVEL_OUTOF10: 9
PAINLEVEL_OUTOF10: 6
PAINLEVEL_OUTOF10: 5
PAINLEVEL_OUTOF10: 3
PAINLEVEL_OUTOF10: 5
PAINLEVEL_OUTOF10: 7
PAINLEVEL_OUTOF10: 7
PAINLEVEL_OUTOF10: 6
PAINLEVEL_OUTOF10: 3
PAINLEVEL_OUTOF10: 3
PAINLEVEL_OUTOF10: 7
PAINLEVEL_OUTOF10: 4
PAINLEVEL_OUTOF10: 0
PAINLEVEL_OUTOF10: 5
PAINLEVEL_OUTOF10: 4
PAINLEVEL_OUTOF10: 3
PAINLEVEL_OUTOF10: 9
PAINLEVEL_OUTOF10: 8
PAINLEVEL_OUTOF10: 2
PAINLEVEL_OUTOF10: 5
PAINLEVEL_OUTOF10: 3
PAINLEVEL_OUTOF10: 5
PAINLEVEL_OUTOF10: 8
PAINLEVEL_OUTOF10: 8
PAINLEVEL_OUTOF10: 7
PAINLEVEL_OUTOF10: 8
PAINLEVEL_OUTOF10: 0
PAINLEVEL_OUTOF10: 7
PAINLEVEL_OUTOF10: 9
PAINLEVEL_OUTOF10: 6
PAINLEVEL_OUTOF10: 2
PAINLEVEL_OUTOF10: 2
PAINLEVEL_OUTOF10: 0
PAINLEVEL_OUTOF10: 6
PAINLEVEL_OUTOF10: 7
PAINLEVEL_OUTOF10: 10
PAINLEVEL_OUTOF10: 5
PAINLEVEL_OUTOF10: 6
PAINLEVEL_OUTOF10: 10
PAINLEVEL_OUTOF10: 3
PAINLEVEL_OUTOF10: 3
PAINLEVEL_OUTOF10: 8
PAINLEVEL_OUTOF10: 7
PAINLEVEL_OUTOF10: 3
PAINLEVEL_OUTOF10: 7
PAINLEVEL_OUTOF10: 8
PAINLEVEL_OUTOF10: 9
PAINLEVEL_OUTOF10: 6
PAINLEVEL_OUTOF10: 9
PAINLEVEL_OUTOF10: 7
PAINLEVEL_OUTOF10: 4
PAINLEVEL_OUTOF10: 7
PAINLEVEL_OUTOF10: 7
PAINLEVEL_OUTOF10: 5
PAINLEVEL_OUTOF10: 9
PAINLEVEL_OUTOF10: 3
PAINLEVEL_OUTOF10: 7
PAINLEVEL_OUTOF10: 0
PAINLEVEL_OUTOF10: 6
PAINLEVEL_OUTOF10: 0
PAINLEVEL_OUTOF10: 8
PAINLEVEL_OUTOF10: 5
PAINLEVEL_OUTOF10: 7
PAINLEVEL_OUTOF10: 7
PAINLEVEL_OUTOF10: 9
PAINLEVEL_OUTOF10: 5
PAINLEVEL_OUTOF10: 6
PAINLEVEL_OUTOF10: 7
PAINLEVEL_OUTOF10: 3
PAINLEVEL_OUTOF10: 2
PAINLEVEL_OUTOF10: 4
PAINLEVEL_OUTOF10: 4
PAINLEVEL_OUTOF10: 9
PAINLEVEL_OUTOF10: 4
PAINLEVEL_OUTOF10: 0
PAINLEVEL_OUTOF10: 1
PAINLEVEL_OUTOF10: 3
PAINLEVEL_OUTOF10: 6
PAINLEVEL_OUTOF10: 8
PAINLEVEL_OUTOF10: 6
PAINLEVEL_OUTOF10: 5
PAINLEVEL_OUTOF10: 3
PAINLEVEL_OUTOF10: 8
PAINLEVEL_OUTOF10: 3
PAINLEVEL_OUTOF10: 3
PAINLEVEL_OUTOF10: 8
PAINLEVEL_OUTOF10: 2
PAINLEVEL_OUTOF10: 9
PAINLEVEL_OUTOF10: 6
PAINLEVEL_OUTOF10: 6
PAINLEVEL_OUTOF10: 1
PAINLEVEL_OUTOF10: 6
PAINLEVEL_OUTOF10: 9
PAINLEVEL_OUTOF10: 3
PAINLEVEL_OUTOF10: 5
PAINLEVEL_OUTOF10: 4
PAINLEVEL_OUTOF10: 6
PAINLEVEL_OUTOF10: 3
PAINLEVEL_OUTOF10: 9
PAINLEVEL_OUTOF10: 6
PAINLEVEL_OUTOF10: 8
PAINLEVEL_OUTOF10: 4
PAINLEVEL_OUTOF10: 0
PAINLEVEL_OUTOF10: 3
PAINLEVEL_OUTOF10: 7
PAINLEVEL_OUTOF10: 8
PAINLEVEL_OUTOF10: 6
PAINLEVEL_OUTOF10: 4
PAINLEVEL_OUTOF10: 4
PAINLEVEL_OUTOF10: 5
PAINLEVEL_OUTOF10: 8
PAINLEVEL_OUTOF10: 3
PAINLEVEL_OUTOF10: 8
PAINLEVEL_OUTOF10: 0
PAINLEVEL_OUTOF10: 4
PAINLEVEL_OUTOF10: 6
PAINLEVEL_OUTOF10: 8
PAINLEVEL_OUTOF10: 7
PAINLEVEL_OUTOF10: 3
PAINLEVEL_OUTOF10: 2
PAINLEVEL_OUTOF10: 9
PAINLEVEL_OUTOF10: 2
PAINLEVEL_OUTOF10: 3
PAINLEVEL_OUTOF10: 5
PAINLEVEL_OUTOF10: 3
PAINLEVEL_OUTOF10: 7
PAINLEVEL_OUTOF10: 5
PAINLEVEL_OUTOF10: 7
PAINLEVEL_OUTOF10: 0
PAINLEVEL_OUTOF10: 6
PAINLEVEL_OUTOF10: 7
PAINLEVEL_OUTOF10: 8
PAINLEVEL_OUTOF10: 3
PAINLEVEL_OUTOF10: 3
PAINLEVEL_OUTOF10: 9
PAINLEVEL_OUTOF10: 7
PAINLEVEL_OUTOF10: 6
PAINLEVEL_OUTOF10: 0
PAINLEVEL_OUTOF10: 5
PAINLEVEL_OUTOF10: 7
PAINLEVEL_OUTOF10: 8
PAINLEVEL_OUTOF10: 6
PAINLEVEL_OUTOF10: 7
PAINLEVEL_OUTOF10: 9
PAINLEVEL_OUTOF10: 8
PAINLEVEL_OUTOF10: 7
PAINLEVEL_OUTOF10: 10
PAINLEVEL_OUTOF10: 7
PAINLEVEL_OUTOF10: 3
PAINLEVEL_OUTOF10: 6
PAINLEVEL_OUTOF10: 9
PAINLEVEL_OUTOF10: 8
PAINLEVEL_OUTOF10: 7
PAINLEVEL_OUTOF10: 6
PAINLEVEL_OUTOF10: 4
PAINLEVEL_OUTOF10: 8
PAINLEVEL_OUTOF10: 7
PAINLEVEL_OUTOF10: 5
PAINLEVEL_OUTOF10: 3
PAINLEVEL_OUTOF10: 4
PAINLEVEL_OUTOF10: 6
PAINLEVEL_OUTOF10: 4
PAINLEVEL_OUTOF10: 4
PAINLEVEL_OUTOF10: 9
PAINLEVEL_OUTOF10: 6
PAINLEVEL_OUTOF10: 5
PAINLEVEL_OUTOF10: 7
PAINLEVEL_OUTOF10: 3
PAINLEVEL_OUTOF10: 8
PAINLEVEL_OUTOF10: 9
PAINLEVEL_OUTOF10: 7
PAINLEVEL_OUTOF10: 7
PAINLEVEL_OUTOF10: 10
PAINLEVEL_OUTOF10: 6
PAINLEVEL_OUTOF10: 3
PAINLEVEL_OUTOF10: 2
PAINLEVEL_OUTOF10: 3
PAINLEVEL_OUTOF10: 3
PAINLEVEL_OUTOF10: 9
PAINLEVEL_OUTOF10: 8
PAINLEVEL_OUTOF10: 9
PAINLEVEL_OUTOF10: 8
PAINLEVEL_OUTOF10: 5
PAINLEVEL_OUTOF10: 5
PAINLEVEL_OUTOF10: 9
PAINLEVEL_OUTOF10: 0
PAINLEVEL_OUTOF10: 6
PAINLEVEL_OUTOF10: 6
PAINLEVEL_OUTOF10: 0
PAINLEVEL_OUTOF10: 8
PAINLEVEL_OUTOF10: 0
PAINLEVEL_OUTOF10: 5
PAINLEVEL_OUTOF10: 9
PAINLEVEL_OUTOF10: 8
PAINLEVEL_OUTOF10: 7
PAINLEVEL_OUTOF10: 9
PAINLEVEL_OUTOF10: 2
PAINLEVEL_OUTOF10: 5
PAINLEVEL_OUTOF10: 9
PAINLEVEL_OUTOF10: 0
PAINLEVEL_OUTOF10: 4
PAINLEVEL_OUTOF10: 2
PAINLEVEL_OUTOF10: 2
PAINLEVEL_OUTOF10: 8
PAINLEVEL_OUTOF10: 3
PAINLEVEL_OUTOF10: 8
PAINLEVEL_OUTOF10: 3
PAINLEVEL_OUTOF10: 4
PAINLEVEL_OUTOF10: 4
PAINLEVEL_OUTOF10: 10
PAINLEVEL_OUTOF10: 5
PAINLEVEL_OUTOF10: 4
PAINLEVEL_OUTOF10: 0
PAINLEVEL_OUTOF10: 5
PAINLEVEL_OUTOF10: 3
PAINLEVEL_OUTOF10: 6

## 2018-01-01 ASSESSMENT — PATIENT HEALTH QUESTIONNAIRE - PHQ9
SUM OF ALL RESPONSES TO PHQ9 QUESTIONS 1 AND 2: 1
SUM OF ALL RESPONSES TO PHQ9 QUESTIONS 1 AND 2: 1
6. FEELING BAD ABOUT YOURSELF - OR THAT YOU ARE A FAILURE OR HAVE LET YOURSELF OR YOUR FAMILY DOWN: NOT AL ALL
8. MOVING OR SPEAKING SO SLOWLY THAT OTHER PEOPLE COULD HAVE NOTICED. OR THE OPPOSITE, BEING SO FIGETY OR RESTLESS THAT YOU HAVE BEEN MOVING AROUND A LOT MORE THAN USUAL: NOT AT ALL
3. TROUBLE FALLING OR STAYING ASLEEP OR SLEEPING TOO MUCH: NOT AT ALL
5. POOR APPETITE OR OVEREATING: NOT AT ALL
5. POOR APPETITE OR OVEREATING: SEVERAL DAYS
6. FEELING BAD ABOUT YOURSELF - OR THAT YOU ARE A FAILURE OR HAVE LET YOURSELF OR YOUR FAMILY DOWN: SEVERAL DAYS
2. FEELING DOWN, DEPRESSED, IRRITABLE, OR HOPELESS: SEVERAL DAYS
SUM OF ALL RESPONSES TO PHQ9 QUESTIONS 1 AND 2: 0
1. LITTLE INTEREST OR PLEASURE IN DOING THINGS: NOT AT ALL
SUM OF ALL RESPONSES TO PHQ QUESTIONS 1-9: 3
2. FEELING DOWN, DEPRESSED, IRRITABLE, OR HOPELESS: SEVERAL DAYS
4. FEELING TIRED OR HAVING LITTLE ENERGY: NOT AT ALL
5. POOR APPETITE OR OVEREATING: NOT AT ALL
5. POOR APPETITE OR OVEREATING: SEVERAL DAYS
1. LITTLE INTEREST OR PLEASURE IN DOING THINGS: NOT AT ALL
8. MOVING OR SPEAKING SO SLOWLY THAT OTHER PEOPLE COULD HAVE NOTICED. OR THE OPPOSITE, BEING SO FIGETY OR RESTLESS THAT YOU HAVE BEEN MOVING AROUND A LOT MORE THAN USUAL: NOT AT ALL
2. FEELING DOWN, DEPRESSED, IRRITABLE, OR HOPELESS: SEVERAL DAYS
3. TROUBLE FALLING OR STAYING ASLEEP OR SLEEPING TOO MUCH: NOT AT ALL
SUM OF ALL RESPONSES TO PHQ QUESTIONS 1-9: 4
2. FEELING DOWN, DEPRESSED, IRRITABLE, OR HOPELESS: SEVERAL DAYS
1. LITTLE INTEREST OR PLEASURE IN DOING THINGS: NOT AT ALL
7. TROUBLE CONCENTRATING ON THINGS, SUCH AS READING THE NEWSPAPER OR WATCHING TELEVISION: SEVERAL DAYS
7. TROUBLE CONCENTRATING ON THINGS, SUCH AS READING THE NEWSPAPER OR WATCHING TELEVISION: NOT AT ALL
SUM OF ALL RESPONSES TO PHQ9 QUESTIONS 1 AND 2: 1
2. FEELING DOWN, DEPRESSED, IRRITABLE, OR HOPELESS: NOT AT ALL
SUM OF ALL RESPONSES TO PHQ9 QUESTIONS 1 AND 2: 0
SUM OF ALL RESPONSES TO PHQ QUESTIONS 1-9: 4
8. MOVING OR SPEAKING SO SLOWLY THAT OTHER PEOPLE COULD HAVE NOTICED. OR THE OPPOSITE, BEING SO FIGETY OR RESTLESS THAT YOU HAVE BEEN MOVING AROUND A LOT MORE THAN USUAL: NOT AT ALL
6. FEELING BAD ABOUT YOURSELF - OR THAT YOU ARE A FAILURE OR HAVE LET YOURSELF OR YOUR FAMILY DOWN: SEVERAL DAYS
SUM OF ALL RESPONSES TO PHQ9 QUESTIONS 1 AND 2: 1
2. FEELING DOWN, DEPRESSED, IRRITABLE, OR HOPELESS: SEVERAL DAYS
SUM OF ALL RESPONSES TO PHQ9 QUESTIONS 1 AND 2: 0
8. MOVING OR SPEAKING SO SLOWLY THAT OTHER PEOPLE COULD HAVE NOTICED. OR THE OPPOSITE, BEING SO FIGETY OR RESTLESS THAT YOU HAVE BEEN MOVING AROUND A LOT MORE THAN USUAL: NOT AT ALL
2. FEELING DOWN, DEPRESSED, IRRITABLE, OR HOPELESS: NOT AT ALL
4. FEELING TIRED OR HAVING LITTLE ENERGY: NOT AT ALL
1. LITTLE INTEREST OR PLEASURE IN DOING THINGS: NOT AT ALL
1. LITTLE INTEREST OR PLEASURE IN DOING THINGS: SEVERAL DAYS
3. TROUBLE FALLING OR STAYING ASLEEP OR SLEEPING TOO MUCH: NOT AT ALL
7. TROUBLE CONCENTRATING ON THINGS, SUCH AS READING THE NEWSPAPER OR WATCHING TELEVISION: SEVERAL DAYS
1. LITTLE INTEREST OR PLEASURE IN DOING THINGS: NOT AT ALL
1. LITTLE INTEREST OR PLEASURE IN DOING THINGS: NOT AT ALL
2. FEELING DOWN, DEPRESSED, IRRITABLE, OR HOPELESS: NOT AT ALL
2. FEELING DOWN, DEPRESSED, IRRITABLE, OR HOPELESS: NOT AT ALL
4. FEELING TIRED OR HAVING LITTLE ENERGY: NOT AT ALL
1. LITTLE INTEREST OR PLEASURE IN DOING THINGS: NOT AT ALL
4. FEELING TIRED OR HAVING LITTLE ENERGY: NOT AT ALL
6. FEELING BAD ABOUT YOURSELF - OR THAT YOU ARE A FAILURE OR HAVE LET YOURSELF OR YOUR FAMILY DOWN: SEVERAL DAYS
SUM OF ALL RESPONSES TO PHQ9 QUESTIONS 1 AND 2: 2
3. TROUBLE FALLING OR STAYING ASLEEP OR SLEEPING TOO MUCH: SEVERAL DAYS
1. LITTLE INTEREST OR PLEASURE IN DOING THINGS: NOT AT ALL
SUM OF ALL RESPONSES TO PHQ9 QUESTIONS 1 AND 2: 0
1. LITTLE INTEREST OR PLEASURE IN DOING THINGS: NOT AT ALL
9. THOUGHTS THAT YOU WOULD BE BETTER OFF DEAD, OR OF HURTING YOURSELF: NOT AT ALL
6. FEELING BAD ABOUT YOURSELF - OR THAT YOU ARE A FAILURE OR HAVE LET YOURSELF OR YOUR FAMILY DOWN: SEVERAL DAYS
1. LITTLE INTEREST OR PLEASURE IN DOING THINGS: NOT AT ALL
7. TROUBLE CONCENTRATING ON THINGS, SUCH AS READING THE NEWSPAPER OR WATCHING TELEVISION: SEVERAL DAYS
9. THOUGHTS THAT YOU WOULD BE BETTER OFF DEAD, OR OF HURTING YOURSELF: NOT AT ALL
8. MOVING OR SPEAKING SO SLOWLY THAT OTHER PEOPLE COULD HAVE NOTICED. OR THE OPPOSITE, BEING SO FIGETY OR RESTLESS THAT YOU HAVE BEEN MOVING AROUND A LOT MORE THAN USUAL: NOT AT ALL
SUM OF ALL RESPONSES TO PHQ9 QUESTIONS 1 AND 2: 1
2. FEELING DOWN, DEPRESSED, IRRITABLE, OR HOPELESS: NOT AT ALL
SUM OF ALL RESPONSES TO PHQ QUESTIONS 1-9: 4
3. TROUBLE FALLING OR STAYING ASLEEP OR SLEEPING TOO MUCH: NOT AT ALL
2. FEELING DOWN, DEPRESSED, IRRITABLE, OR HOPELESS: SEVERAL DAYS
9. THOUGHTS THAT YOU WOULD BE BETTER OFF DEAD, OR OF HURTING YOURSELF: NOT AT ALL
9. THOUGHTS THAT YOU WOULD BE BETTER OFF DEAD, OR OF HURTING YOURSELF: NOT AT ALL
5. POOR APPETITE OR OVEREATING: NOT AT ALL
4. FEELING TIRED OR HAVING LITTLE ENERGY: SEVERAL DAYS
SUM OF ALL RESPONSES TO PHQ9 QUESTIONS 1 AND 2: 0
2. FEELING DOWN, DEPRESSED, IRRITABLE, OR HOPELESS: SEVERAL DAYS
7. TROUBLE CONCENTRATING ON THINGS, SUCH AS READING THE NEWSPAPER OR WATCHING TELEVISION: SEVERAL DAYS
SUM OF ALL RESPONSES TO PHQ QUESTIONS 1-9: 3
SUM OF ALL RESPONSES TO PHQ9 QUESTIONS 1 AND 2: 1
1. LITTLE INTEREST OR PLEASURE IN DOING THINGS: NOT AT ALL
9. THOUGHTS THAT YOU WOULD BE BETTER OFF DEAD, OR OF HURTING YOURSELF: NOT AT ALL

## 2018-01-01 ASSESSMENT — COGNITIVE AND FUNCTIONAL STATUS - GENERAL
STANDING UP FROM CHAIR USING ARMS: A LOT
MOBILITY SCORE: 22
CLIMB 3 TO 5 STEPS WITH RAILING: A LITTLE
PERSONAL GROOMING: A LITTLE
MOBILITY SCORE: 18
WALKING IN HOSPITAL ROOM: A LITTLE
CLIMB 3 TO 5 STEPS WITH RAILING: A LITTLE
SUGGESTED CMS G CODE MODIFIER MOBILITY: CJ
CLIMB 3 TO 5 STEPS WITH RAILING: A LOT
DRESSING REGULAR UPPER BODY CLOTHING: A LITTLE
MOBILITY SCORE: 23
WALKING IN HOSPITAL ROOM: A LITTLE
EATING MEALS: A LITTLE
TOILETING: A LOT
WALKING IN HOSPITAL ROOM: A LITTLE
MOVING TO AND FROM BED TO CHAIR: A LOT
STANDING UP FROM CHAIR USING ARMS: A LITTLE
CLIMB 3 TO 5 STEPS WITH RAILING: A LITTLE
MOVING TO AND FROM BED TO CHAIR: UNABLE
MOVING FROM LYING ON BACK TO SITTING ON SIDE OF FLAT BED: A LITTLE
SUGGESTED CMS G CODE MODIFIER DAILY ACTIVITY: CI
CLIMB 3 TO 5 STEPS WITH RAILING: A LITTLE
SUGGESTED CMS G CODE MODIFIER MOBILITY: CK
DAILY ACTIVITIY SCORE: 23
WALKING IN HOSPITAL ROOM: A LITTLE
MOVING TO AND FROM BED TO CHAIR: A LITTLE
TURNING FROM BACK TO SIDE WHILE IN FLAT BAD: A LOT
MOBILITY SCORE: 22
WALKING IN HOSPITAL ROOM: A LOT
TURNING FROM BACK TO SIDE WHILE IN FLAT BAD: A LITTLE
SUGGESTED CMS G CODE MODIFIER MOBILITY: CJ
STANDING UP FROM CHAIR USING ARMS: A LITTLE
DRESSING REGULAR LOWER BODY CLOTHING: A LOT
SUGGESTED CMS G CODE MODIFIER MOBILITY: CK
CLIMB 3 TO 5 STEPS WITH RAILING: A LITTLE
CLIMB 3 TO 5 STEPS WITH RAILING: A LITTLE
MOVING TO AND FROM BED TO CHAIR: A LITTLE
MOBILITY SCORE: 20
HELP NEEDED FOR BATHING: A LITTLE
MOBILITY SCORE: 18
WALKING IN HOSPITAL ROOM: A LITTLE
CLIMB 3 TO 5 STEPS WITH RAILING: A LITTLE
CLIMB 3 TO 5 STEPS WITH RAILING: A LITTLE
MOBILITY SCORE: 12
MOBILITY SCORE: 22
TURNING FROM BACK TO SIDE WHILE IN FLAT BAD: A LITTLE
SUGGESTED CMS G CODE MODIFIER MOBILITY: CL
HELP NEEDED FOR BATHING: A LOT
SUGGESTED CMS G CODE MODIFIER MOBILITY: CI
SUGGESTED CMS G CODE MODIFIER MOBILITY: CJ
MOVING FROM LYING ON BACK TO SITTING ON SIDE OF FLAT BED: A LOT
DAILY ACTIVITIY SCORE: 15
SUGGESTED CMS G CODE MODIFIER MOBILITY: CJ
MOBILITY SCORE: 21
MOVING FROM LYING ON BACK TO SITTING ON SIDE OF FLAT BED: A LITTLE
SUGGESTED CMS G CODE MODIFIER DAILY ACTIVITY: CK
WALKING IN HOSPITAL ROOM: A LITTLE
WALKING IN HOSPITAL ROOM: A LITTLE
SUGGESTED CMS G CODE MODIFIER MOBILITY: CJ

## 2018-01-01 ASSESSMENT — GAIT ASSESSMENTS
DEVIATION: BRADYKINETIC;DECREASED HEEL STRIKE;DECREASED TOE OFF
DEVIATION: SHUFFLED GAIT
DEVIATION: BRADYKINETIC
DISTANCE (FEET): 125
DEVIATION: BRADYKINETIC
GAIT LEVEL OF ASSIST: STAND BY ASSIST
ASSISTIVE DEVICE: FRONT WHEEL WALKER
DISTANCE (FEET): 100
GAIT LEVEL OF ASSIST: CONTACT GUARD ASSIST
DISTANCE (FEET): 300
ASSISTIVE DEVICE: FRONT WHEEL WALKER
ASSISTIVE DEVICE: FRONT WHEEL WALKER
DISTANCE (FEET): 250
DISTANCE (FEET): 500
DEVIATION: BRADYKINETIC;SHUFFLED GAIT
DISTANCE (FEET): 100
ASSISTIVE DEVICE: FRONT WHEEL WALKER
GAIT LEVEL OF ASSIST: SUPERVISED
GAIT LEVEL OF ASSIST: STAND BY ASSIST
GAIT LEVEL OF ASSIST: SUPERVISED
DEVIATION: BRADYKINETIC
GAIT LEVEL OF ASSIST: CONTACT GUARD ASSIST
ASSISTIVE DEVICE: FRONT WHEEL WALKER
GAIT LEVEL OF ASSIST: STAND BY ASSIST
DISTANCE (FEET): 500

## 2018-01-01 ASSESSMENT — LIFESTYLE VARIABLES
DO YOU DRINK ALCOHOL: NO
EVER_SMOKED: YES
SUBSTANCE_ABUSE: 0
ALCOHOL_USE: NO
DO YOU DRINK ALCOHOL: NO
PACK_YEARS: 48
EVER_SMOKED: YES
SUBSTANCE_ABUSE: 0
DO YOU DRINK ALCOHOL: NO
ALCOHOL_USE: NO
SUBSTANCE_ABUSE: 0
EVER_SMOKED: YES
EVER_SMOKED: NEVER
DO YOU DRINK ALCOHOL: NO
DO YOU DRINK ALCOHOL: NO
PACK_YEARS: 48
SUBSTANCE_ABUSE: 0
DO YOU DRINK ALCOHOL: NO
EVER_SMOKED: YES
DO YOU DRINK ALCOHOL: NO

## 2018-01-01 ASSESSMENT — ACTIVITIES OF DAILY LIVING (ADL)
TOILETING: INDEPENDENT
DRESSING_REQUIRES_ASSISTANCE: 1
PHYSICAL_TRANSFER_REQUIRES_ASSISTANCE: 1
MONEY MANAGEMENT (EXPENSES/BILLS): TOTALLY DEPENDENT
AMBULATION_REQUIRES_ASSISTANCE: 1
CONTINENCE_REQUIRES_ASSISTANCE: 1
CONTINENCE_REQUIRES_ASSISTANCE: 1
BATHING_REQUIRES_ASSISTANCE: 1
BATHING_REQUIRES_ASSISTANCE: 1
PHYSICAL_TRANSFER_REQUIRES_ASSISTANCE: 1
DRESSING_REQUIRES_ASSISTANCE: 1
MONEY MANAGEMENT (EXPENSES/BILLS): TOTALLY DEPENDENT
AMBULATION_REQUIRES_ASSISTANCE: 1

## 2018-01-01 ASSESSMENT — PAIN SCALES - WONG BAKER
WONGBAKER_NUMERICALRESPONSE: DOESN'T HURT AT ALL
WONGBAKER_NUMERICALRESPONSE: DOESN'T HURT AT ALL

## 2018-01-01 ASSESSMENT — COPD QUESTIONNAIRES
HAVE YOU SMOKED AT LEAST 100 CIGARETTES IN YOUR ENTIRE LIFE: YES
COPD SCREENING SCORE: 4
HAVE YOU SMOKED AT LEAST 100 CIGARETTES IN YOUR ENTIRE LIFE: YES
DURING THE PAST 4 WEEKS HOW MUCH DID YOU FEEL SHORT OF BREATH: SOME OF THE TIME
IN THE PAST 12 MONTHS DO YOU DO LESS THAN YOU USED TO BECAUSE OF YOUR BREATHING PROBLEMS: DISAGREE/UNSURE
DURING THE PAST 4 WEEKS HOW MUCH DID YOU FEEL SHORT OF BREATH: NONE/LITTLE OF THE TIME
DO YOU EVER COUGH UP ANY MUCUS OR PHLEGM?: NO/ONLY WITH OCCASIONAL COLDS OR INFECTIONS
HAVE YOU SMOKED AT LEAST 100 CIGARETTES IN YOUR ENTIRE LIFE: YES
DO YOU EVER COUGH UP ANY MUCUS OR PHLEGM?: NO/ONLY WITH OCCASIONAL COLDS OR INFECTIONS
COPD SCREENING SCORE: 5
DURING THE PAST 4 WEEKS HOW MUCH DID YOU FEEL SHORT OF BREATH: NONE/LITTLE OF THE TIME
HAVE YOU SMOKED AT LEAST 100 CIGARETTES IN YOUR ENTIRE LIFE: YES
DO YOU EVER COUGH UP ANY MUCUS OR PHLEGM?: NO/ONLY WITH OCCASIONAL COLDS OR INFECTIONS
DURING THE PAST 4 WEEKS HOW MUCH DID YOU FEEL SHORT OF BREATH: NONE/LITTLE OF THE TIME
DO YOU EVER COUGH UP ANY MUCUS OR PHLEGM?: NO/ONLY WITH OCCASIONAL COLDS OR INFECTIONS

## 2018-04-13 NOTE — PROGRESS NOTES
Direct admit from Dr. Tarango at [facility]. Dr. Reveles accepting for Liver mass. ADT signed and held 04/13/2018 at 1524 and will need to be released upon patient arrival to unit. Patient arriving via ground transport.

## 2018-04-14 PROBLEM — C80.1 ADENOCARCINOMA (HCC): Status: ACTIVE | Noted: 2018-01-01

## 2018-04-14 PROBLEM — R10.9 ABDOMINAL PAIN: Status: ACTIVE | Noted: 2018-01-01

## 2018-04-14 PROBLEM — Z72.0 TOBACCO ABUSE: Status: ACTIVE | Noted: 2018-01-01

## 2018-04-14 PROBLEM — R11.2 INTRACTABLE NAUSEA AND VOMITING: Status: ACTIVE | Noted: 2018-01-01

## 2018-04-14 PROBLEM — I10 HTN (HYPERTENSION): Status: ACTIVE | Noted: 2018-01-01

## 2018-04-14 NOTE — ASSESSMENT & PLAN NOTE
Patient underwent biopsy showing poorly differentiated adenocarcinoma. Path report reviewed with Dr Campbell from their office records  Upper and lower endoscopy completed 4/15 - no evidence of mass nor malignancy  MRI abdomen/pelvis shows multiple necrotic masses in liver   and CEA elevated, CA 19-9 normal  AFP 4119, CA-27-29 pending  Onc Zen will discuss with sharad Aleman to see if patient needs another biopsy

## 2018-04-14 NOTE — ASSESSMENT & PLAN NOTE
No emesis but does not have appetite  The patient reports chronic nausea and vomiting since last fall when she was diagnosed with a liver mass.   It has worsened in the last several days and she has been unable to control her symptoms at home.   IV anti-emetics to control her nausea.  Regular diet

## 2018-04-14 NOTE — CARE PLAN
Problem: Communication  Goal: The ability to communicate needs accurately and effectively will improve    Intervention: [place] patient and significant other/support system to call light to alert staff of needs  Pt oriented to the nursing unit.  Intervention: Educate patient and significant other/support system about the plan of care, procedures, treatments, medications and allow for questions  Pt updated on POC, all questions have been answered at this time.

## 2018-04-14 NOTE — PROGRESS NOTES
Assumed care of pt @0700. Bedside report received. Pt AOX 4. Pt complains about pain. Roxicodone 5 mg given. Pt complains about nausea. Scheduled Reglan given. PIV running IV fluids. Rash to L upper buttock. MD notified. Pt up self. Fall precaution in place. POC discussed with pt, all questions answered at this time. Pt makes needs known, call light within reach, hourly rounding in place.

## 2018-04-14 NOTE — H&P
Hospital Medicine History and Physical    Date of Service  4/13/2018    Chief Complaint  Transfer from outside hospital for intractable nausea and vomiting and abdominal pain in the setting of known adenocarcinoma    History of Presenting Illness  63 y.o. female who presented on 4/13/2018 to outside hospital with complaints of worsening nausea, vomiting, and abdominal pain. The patient has a history of liver mass which was diagnosed in late 2017 she had apparently been lost to follow-up and only recently reestablished care. On April 4, the patient underwent a liver biopsy which came back positive for poorly differentiated adenocarcinoma. Patient has been followed by Dr. Echeverria of gastroenterology and plans have been made for outpatient colonoscopy and an EGD next week. However, 2 days ago, patient had worsening of her chronic nausea, vomiting, and right upper quadrant abdominal pain. She states that she has had mild symptoms of this which wax and wane since last fall. In the last 2 days however, her right upper quadrant pain became intractable, it was sharp, constant, with no alleviating or aggravating factors. She was seen at an outside hospital where she was admitted for symptomatic management. GI was consulted by this facility and they recommended that the patient be transferred to [facility] for higher level of care and specialist consultation.        Primary Care Physician  Tg Goodwin M.D.    Consultants  Dr. Echeverria, gastroenterology    Code Status  Full    Review of Systems  Review of Systems   Constitutional: Negative for chills and fever.   HENT: Negative for congestion and sore throat.    Eyes: Negative for photophobia.   Respiratory: Negative for cough, shortness of breath and wheezing.    Cardiovascular: Negative for chest pain and palpitations.   Gastrointestinal: Positive for abdominal pain, nausea and vomiting. Negative for diarrhea.   Genitourinary: Negative for  dysuria.   Musculoskeletal: Negative for myalgias.   Skin: Negative.    Neurological: Negative for dizziness, tingling, focal weakness and headaches.   Psychiatric/Behavioral: Negative for depression and suicidal ideas.        Past Medical History  Liver mass, hypertension, hyperlipidemia, malignant neoplasm of the uterus and ovaries    Surgical History  Hysterectomy, appendectomy, abdominal reconstruction    Medications  No current facility-administered medications on file prior to encounter.      No current outpatient prescriptions on file prior to encounter.       Family History  Father with colon cancer, mother with liver disease    Social History  Social History   Substance Use Topics   • Smoking status: Not on file   • Smokeless tobacco: Not on file   • Alcohol use Not on file       Allergies  Allergies   Allergen Reactions   • Iodine Anaphylaxis        Physical Exam  Laboratory   Hemodynamics  Temp (24hrs), Av.2 °C (97.1 °F), Min:36.2 °C (97.1 °F), Max:36.2 °C (97.1 °F)   Temperature: 36.2 °C (97.1 °F)  Pulse  Av  Min: 94  Max: 94    Blood Pressure: 119/80      Respiratory      Respiration: 18, Pulse Oximetry: 96 %             Physical Exam   Constitutional: She is oriented to person, place, and time. No distress.   HENT:   Head: Normocephalic and atraumatic.   Right Ear: External ear normal.   Left Ear: External ear normal.   Eyes: EOM are normal. Right eye exhibits no discharge. Left eye exhibits no discharge.   Neck: Neck supple. No JVD present.   Cardiovascular: Normal rate, regular rhythm and normal heart sounds.    Pulmonary/Chest: Effort normal and breath sounds normal. No respiratory distress. She exhibits no tenderness.   Abdominal: Soft. Bowel sounds are normal. She exhibits no distension. There is tenderness (right upper quadrant, hepatomegaly).   Musculoskeletal: Normal range of motion. She exhibits no edema.   Neurological: She is alert and oriented to person, place, and time. No cranial  nerve deficit.   Skin: Skin is warm and dry. She is not diaphoretic. No erythema.   Psychiatric: She has a normal mood and affect. Her behavior is normal.   Nursing note and vitals reviewed.    Capillary refill less than 3 seconds, distal pulses intact            No results for input(s): ALTSGPT, ASTSGOT, ALKPHOSPHAT, TBILIRUBIN, DBILIRUBIN, GAMMAGT, AMYLASE, LIPASE, ALB, PREALBUMIN, GLUCOSE in the last 72 hours.              No results found for: TROPONINI    Imaging  No results found.   Assessment/Plan     Anticipate that patient will need greater than 2 midnights for management of the discussed medical issues.    * Intractable nausea and vomiting   Assessment & Plan    The patient reports chronic nausea and vomiting since last fall when she was diagnosed with a liver mass. It has worsened in the last several days and she has been unable to control her symptoms at home. She will be admitted to the hospital and started on IV anti-emetics to control her nausea. I will give her clear liquid diet and once her symptoms are well controlled, we will begin to advance this.        Abdominal pain   Assessment & Plan    Similar to above, the patient has had significant abdominal pain in the right upper quadrant since last fall when she was diagnosed with a liver mass. She is typically able to control this at home but in the last several days it has been unbearable. I will control her nausea as noted above, start her on both IV and oral narcotic pain medications. I suspect this is secondary to her underlying malignancy. If we have any issues with her pain, then we will consider a palliative care consultation to assist with malignant pain management.        Adenocarcinoma (CMS-HCC)   Assessment & Plan    Patient underwent biopsy approximately 1-1/2 weeks ago with diagnosis of undifferentiated adenocarcinoma. I have discussed this patient with Dr. Haines of gastrology. The patient will be seen in the morning by the  specialist and if her symptoms are improved, then will likely begin prep for inpatient colonoscopy and EGD. We will plan to discuss this patient with oncology in the morning.        HTN (hypertension)   Assessment & Plan    She is not on any medications at home for blood pressure, I will place her on when necessary IV antihypertensives and we will assess for continued need.        Tobacco abuse   Assessment & Plan    Greater than 3 minutes are spent at bedside and tobacco cessation counseling, nicotine replacement ordered, will continue to encourage cessation.          Prophylaxis: Sequential compression devices for DVT prophylaxis, no PPI indicated, bowel protocol

## 2018-04-14 NOTE — PROGRESS NOTES
Assumed care of patient at 0700. Pt is A&Ox4. PIV to right forearm with IVF. Pt complains of 8/10 abdominal pain, medicated per MAR. Call light within reach, no needs at this time.     My role on the Cancer Nursing Unit is a nurse apprentice not a patient access representative. Will fix this issue with Epic.

## 2018-04-14 NOTE — CARE PLAN
Problem: Bowel/Gastric:  Goal: Normal bowel function is maintained or improved  Outcome: PROGRESSING SLOWER THAN EXPECTED  Pt complains about nausea. Scheduled Reglan given.     Problem: Pain Management  Goal: Pain level will decrease to patient's comfort goal  Outcome: PROGRESSING AS EXPECTED  Pt complains about pain. Roxicodone 5 mg given.

## 2018-04-14 NOTE — PROGRESS NOTES
2 RN skin assessment has been performed. Pt has a white bump/pustule noted on the sacrum. The patient also has a small cluster rash with red and white bumps present on the upper L buttock, as well as the upper gluteal cleft. Skin is otherwise intact.

## 2018-04-14 NOTE — ASSESSMENT & PLAN NOTE
She is not on any medications at home for blood pressure  PRN IV antihypertensives and we will assess for continued need.

## 2018-04-14 NOTE — PROGRESS NOTES
Assumed care of the pt at 1945. 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 Pt has been oriented to the unit. Pt is AOx4, with complaint of 5/10 RLQ pain. Pt has been medicated with PRN PO oxycodone IR. See MAR. Pt has a PIV w/ IVF. Pt is up self/steady. Pt has a small rash on the L upper buttock cheek and the upper gluteal fold, MD aware. Pt updated on POC, all questions have been answered. Fall precautions have been implemented. Call light within reach, hourly rounding in place.

## 2018-04-14 NOTE — CONSULTS
DATE OF SERVICE:  04/14/2018    REFERRING PHYSICIAN:  Colleen Melara DO    CHIEF COMPLAINT:  We were asked to evaluate this patient by Dr. Melara for   evaluation of liver mass and poorly differentiated adenocarcinoma.    HISTORY OF PRESENT ILLNESS:  The patient is a 63-year-old female with history   of prior uterine cancer in the 1970s, status post hysterectomy, hypertension,   and hyperlipidemia who has been evaluated by Dr. Echeverria as an outpatient and   found to have a large liver mass consistent with poorly differentiated   adenocarcinoma of unclear origin.    She began having some problems with right upper quadrant abdominal pain late   in 2017.  There was initial workup for gallbladder disease.  She apparently   had an ultrasound in November 2017 showing a large solid mass in the right   lobe of the liver.  She has undergone additional imaging, although all imaging   has been without contrast that she refuses any contrast with both CT and MRI   given her history of an IODINE ALLERGY.  Previous imaging with MRI without   contrast showed a 9.4 cm lesion in segment 6 with a trace amount of ascites.    She then underwent a CT-guided biopsy just recently of this lesion.  This   showed a poorly differentiated adenocarcinoma, CK20 positive and CK7 negative   concerning for GI origin.  She did have a colonoscopy in April 2017 by Dr. Echeverria, which showed a small polyp only.    Other than her right upper quadrant abdominal pain, she has had some bilious   emesis as well as slight change in bowel habits with more constipation.  She   notes weight loss of 20 pounds.  Denies fevers or chills.  Denies hematemesis,   melena, or hematochezia.  She was scheduled to have an outpatient upper   endoscopy and colonoscopy by Dr. Echeverria, but unfortunately she presented for   severe abdominal pain.  Her pain is now better controlled.  She is tolerating   liquids.    PAST MEDICAL HISTORY:  1.  Hypertension.  2.   Hyperlipidemia.  3.  History of uterine cancer status post hysterectomy.  4.  Anxiety and depression.  5.  Peripheral neuropathy.  6.  Chronic pain.    PAST SURGICAL HISTORY:  1.  Hysterectomy.  2.  Appendectomy.  3.  Hernia surgery.  4.  Eye surgery.    MEDICATIONS:  1.  Reglan.  2.  IV fluids.  3.  Zofran.  4.  Oxycodone.  5.  Dilaudid.    ALLERGIES:  IODINE AND SHELLFISH.    FAMILY HISTORY:  Father did have colon cancer.  Mother has some form of liver   disease.    SOCIAL HISTORY:  She does smoke cigarettes.  Denies alcohol or other drugs.    REVIEW OF SYSTEMS:  A complete 14-point review of systems was obtained and   found to be significant for right upper quadrant abdominal pain, nausea,   vomiting, change in bowel habits, weight loss, fatigue, but was otherwise   unremarkable.    PHYSICAL EXAMINATION:  VITAL SIGNS:  Afebrile, heart rate 70, blood pressure 119/71, satting 93% on   room air.  GENERAL:  She is a well-appearing female in no acute distress.  HEENT:  Eyes show anicteric sclerae.  Mouth shows normal oropharynx.  NECK:  Shows no lymphadenopathy.  Thyroid shows no thyromegaly.  LUNGS:  Clear to auscultation bilaterally.  CARDIOVASCULAR:  Regular rate and rhythm.  ABDOMEN:  Soft, nondistended with good bowel sounds.  She was tender to   palpation in the right upper quadrant area.  She does have a palpable mass in   the right upper quadrant.  No appreciable splenomegaly.  EXTREMITIES:  Revealed no edema.  NEUROLOGIC:  Nonfocal.  SKIN:  Revealed no rashes.    LABORATORY DATA:  Showed a CBC with a white blood cell count of 9.4,   hemoglobin 12.8, and platelet count of 224.  Chemistries showed BUN of 15 and   creatinine of 0.7.  LFTs show alkaline phosphatase of 153, AST of 55, ALT of   24, total bilirubin 0.4 and albumin of 3.4.    IMPRESSION:  The patient is a 63-year-old female who is known to have a large   liver mass consistent with poorly differentiated adenocarcinoma concerning for   a GI origin.   She does have some abdominal pain with associated bilious   emesis and slight change in bowel habits.  I feel that she does warrant repeat   endoscopic evaluation with both upper endoscopy and colonoscopy.  We will   need anesthesia for her chronic pain issues.  Attempting to arrange for this   tomorrow, but if not tomorrow then will be Monday.  We will check some serum   tumor markers as well.    PROBLEMS:  1.  Poorly differentiated adenocarcinoma, suspect GI origin based on   staining from liver mass biopsies.  2.  Right upper quadrant pain, likely secondary to liver mass.  3.  Nausea and vomiting.  4.  Weight loss.  5.  Mildly elevated liver tests likely secondary to liver mass.  6.  Hypertension.  7.  Hyperlipidemia.  8.  History of uterine cancer.  9.  Anxiety and depression.  10.  Chronic pain.    PLAN:  1.  Plan for upper endoscopy and colonoscopy with anesthesia.  Attempting to   arrange for this tomorrow, but if not tomorrow then Monday.  2.  Continue clear liquid diet.  3.  Check serum tumor markers including AFP, CEA, CA 19-9 and CA-125.    Thank you for allowing me to take part in the care of your patient.  Please   feel free to call with any questions.       ____________________________________     MD ELLI ZARATE / EMPERATRZI    DD:  04/14/2018 10:37:30  DT:  04/14/2018 11:06:52    D#:  [id number]  Job#:  [id number]

## 2018-04-15 NOTE — PROCEDURES
DATE OF SERVICE:  04/15/2018    PROCEDURE:  Colonoscopy.    :  Fredi Trejo MD    INDICATION:  The patient is a 63-year-old female with history of newly   diagnosed liver mass consistent with poorly differentiated adenocarcinoma,   suspected GI origin.    INFORMED CONSENT:  Written informed consent was obtained from the patient   after thorough explanation of indications, benefits and risks of the procedure   which included but was not limited to bleeding, infection, perforation,   adverse reaction to medications and missed lesions.    MEDICATIONS GIVEN:  Monitored anesthesia care with propofol.    Continuous telemetry, BP, pulse oximetry, and capnography were performed by   the anesthesiologist throughout the procedure.    An adult colonoscope was used for the procedure.    FINDINGS:  Patient was placed in the left lateral decubitus position.  After   anesthesia was achieved, a digital rectal exam was performed and found to be   normal.  The endoscope was then inserted into the rectum and advanced to the   cecum which was identified by the appendiceal orifice and ileocecal valve.    The colonoscope was then withdrawn with careful inspection of mucosa.  The   colonic mucosa appeared normal throughout.  She did have mild sigmoid   diverticulosis.  No other lesions noted.  A retroflexed view in the rectum was   performed and found to be normal.  The patient underwent a MoviPrep, the   results of which were excellent.  The patient tolerated the procedure well.    IMPRESSION:  1.  Mild sigmoid diverticulosis.  2.  Otherwise, normal colonoscopy.    PLAN:  1.  Obtain MRI of abdomen with and without contrast.  2.  Oncology consultation.  3.  Advance diet.       ____________________________________     FREDI TREJO MD    ELLI / EMPERATRIZ    DD:  04/15/2018 11:44:45  DT:  04/15/2018 11:53:30    D#:  [id number]  Job#:  [id number]

## 2018-04-15 NOTE — PROGRESS NOTES
Social visit--DEVANTE Castaneda has started her prep and up to the bathroom. She is getting pain relief.  Has some nausea.  Appetite diminished and foods tastes poorly.  Intermittent constipation.  No hematochezia.  EGD/colon tomorrow.  I will follow up with Dr. Guillen tomorrow.

## 2018-04-15 NOTE — PROGRESS NOTES
Assumed care of pt at 1845. Received report from BERAHNE Roberson. Pt A/O x 4 . R PIV in place - NS running at 100. Plan to have EDG and Colonoscopy tomorrow 4/15/2018. Pt is to be NPO at 0000. Due for bowel prep at 0200. Discussed POC with pt at bedside. No complaints of pain at this time. Pt up self. Pt has no further questions or concerns at this time. Bed in lowest, locked position. Call light within reach.

## 2018-04-15 NOTE — PROGRESS NOTES
[facility] Hospitalist Progress Note    Date of Service: 2018    Chief Complaint  63 y.o. female admitted 2018 with poorly differentiated adenocarcinoma, direct admission from OSH and after GI contacted, recommended transfer to [facility]    Interval Problem Update  Patient feeling nauseated and having diarrhea secondary to bowel prep she has started for upper and lower endoscopies for further evaluation of her adenocarcinoma from liver biopsy.      Consultants/Specialty  [facility] - GI    Disposition  tbd        Review of Systems   Constitutional: Negative for chills and fever.   HENT: Negative for congestion.    Eyes: Negative for blurred vision and photophobia.   Respiratory: Negative for cough and shortness of breath.    Cardiovascular: Negative for chest pain, claudication and leg swelling.   Gastrointestinal: Positive for abdominal pain, diarrhea (with bowel prep), nausea and vomiting. Negative for constipation and heartburn.   Genitourinary: Negative for dysuria and hematuria.   Musculoskeletal: Negative for joint pain and myalgias.   Skin: Negative for itching and rash.   Neurological: Negative for dizziness, sensory change, speech change, weakness and headaches.   Psychiatric/Behavioral: Negative for depression. The patient is not nervous/anxious and does not have insomnia.       Physical Exam  Laboratory/Imaging   Hemodynamics  Temp (24hrs), Av.7 °C (98 °F), Min:36.2 °C (97.1 °F), Max:37.1 °C (98.8 °F)   Temperature: 36.7 °C (98.1 °F)  Pulse  Av.7  Min: 70  Max: 94    Blood Pressure: 131/75      Respiratory      Respiration: 18, Pulse Oximetry: 97 %        RUL Breath Sounds: Clear, RML Breath Sounds: Clear, RLL Breath Sounds: Clear, JACQUELYN Breath Sounds: Clear, LLL Breath Sounds: Clear    Fluids    Intake/Output Summary (Last 24 hours) at 18 1746  Last data filed at 18 1400   Gross per 24 hour   Intake             1289 ml   Output                0 ml   Net             1289 ml        Nutrition  Orders Placed This Encounter   Procedures   • Diet Order     Standing Status:   Standing     Number of Occurrences:   1     Order Specific Question:   Diet:     Answer:   Clear Liquid [10]   • DIET NPO     Standing Status:   Standing     Number of Occurrences:   8     Order Specific Question:   Type:     Answer:   At Midnight [5]     Order Specific Question:   Restrict to:     Answer:   Sips with Medications [3]     Physical Exam   Constitutional: She is oriented to person, place, and time. She appears well-developed and well-nourished. No distress.   HENT:   Head: Normocephalic and atraumatic.   Eyes: Conjunctivae are normal. No scleral icterus.   Neck: Neck supple. No JVD present.   Cardiovascular: Normal rate, regular rhythm and normal heart sounds.  Exam reveals no gallop and no friction rub.    No murmur heard.  Pulmonary/Chest: Effort normal and breath sounds normal. No respiratory distress. She exhibits no tenderness.   Abdominal: Soft. Bowel sounds are normal. She exhibits no distension. There is no tenderness. There is no guarding.   Musculoskeletal: She exhibits no edema or tenderness.   Neurological: She is alert and oriented to person, place, and time. No cranial nerve deficit.   Skin: Skin is warm and dry. She is not diaphoretic. No erythema. No pallor.   Psychiatric: She has a normal mood and affect. Her behavior is normal.   Nursing note and vitals reviewed.      Recent Labs      04/14/18   0056   WBC  9.4   RBC  3.77*   HEMOGLOBIN  12.8   HEMATOCRIT  38.4   MCV  101.9*   MCH  34.0*   MCHC  33.3*   RDW  47.6   PLATELETCT  224   MPV  10.2     Recent Labs      04/14/18   0056   SODIUM  136   POTASSIUM  3.7   CHLORIDE  104   CO2  25   GLUCOSE  103*   BUN  15   CREATININE  0.70   CALCIUM  8.9                      Assessment/Plan     * Intractable nausea and vomiting   Assessment & Plan    The patient reports chronic nausea and vomiting since last fall when she was diagnosed with a liver  mass.   It has worsened in the last several days and she has been unable to control her symptoms at home.   IV anti-emetics to control her nausea.  Clear liquid diet and NPO at MN for procedure        Abdominal pain   Assessment & Plan    As needed pain medication          Adenocarcinoma (CMS-HCC)   Assessment & Plan    Patient underwent biopsy showing poorly differentiated adenocarcinoma. Path report reviewed with Dr Campbell from their office records  Upper and lower endoscopy tomorrow  Tumor markers pending.        HTN (hypertension)   Assessment & Plan    She is not on any medications at home for blood pressure  PRN IV antihypertensives and we will assess for continued need.        Tobacco abuse   Assessment & Plan    cessation counseling, nicotine replacement ordered          Quality-Core Measures   Reviewed items::  Labs reviewed, Medications reviewed and Radiology images reviewed  Duarte catheter::  No Duarte  DVT prophylaxis - mechanical:  SCDs

## 2018-04-15 NOTE — OR SURGEON
Immediate Post OP Note    PreOp Diagnosis: Poorly differentiated adenocarcinoma in liver    PostOp Diagnosis: Poorly differentiated adenocarcinoma in liver    Procedure(s):  GASTROSCOPY - Wound Class: Clean Contaminated  COLONOSCOPY - Wound Class: Clean Contaminated    Surgeon(s):  Fredi Guillen M.D.    Anesthesiologist/Type of Anesthesia:  Anesthesiologist: Carlo Ratliff M.D./VEENA    Surgical Staff:  Circulator: Evelyn Dye R.N.  Endoscopy Technician: Rehana Fraga    Specimens removed if any:  None    Estimated Blood Loss: None    Findings:   1. Normal EGD  2. Mild sigmoid diverticulosis, otherwise normal    Complications: None        4/15/2018 11:29 AM Fredi Guillen M.D.

## 2018-04-15 NOTE — PROGRESS NOTES
Pt alert and oriented x 4. Denies nausea but complains of RUQ pain of a 7/10. Medicated as needed for pain with po oxycodone with positive results. PIV to R FA flushing well with no pain or redness at the site. Fluids infusing. Pt with severe neuropathy and sensitivity to bilateral feet. States this is her baseline. Pt up self with a steady gait. NPO for scheduled procedure. Pt resting comfortably at this time. Call light within reach. Hourly rounding in place.

## 2018-04-15 NOTE — CARE PLAN
Problem: Safety  Goal: Will remain free from falls  Outcome: PROGRESSING AS EXPECTED  Assessed pt's gait and mobility. Room free from clutter. Bed in lowest, locked position. Call light and personal belongings within reach. Night light provided.     Problem: Pain Management  Goal: Pain level will decrease to patient's comfort goal  Outcome: PROGRESSING AS EXPECTED  Discussed pain management. Assessed pt's pain. Administered medications per MAR.

## 2018-04-15 NOTE — PROCEDURES
DATE OF SERVICE:  04/15/2018    PROCEDURE:  Upper endoscopy.    :  Fredi Trejo MD    INDICATION:  The patient is a 63-year-old female with a history of large liver   mass consistent with poorly differentiated adenocarcinoma, suspected of GI   origin.    INFORMED CONSENT:  Written informed consent was obtained from the patient   after thorough explanation of indications, benefits and risks of the   procedure, which included, but was not limited to bleeding, infection,   perforation, adverse reaction to medications and missed lesions.    MEDICATIONS GIVEN:  Monitored anesthesia care with propofol.    Continuous telemetry, BP, pulse oximetry, and capnography were performed by   the anesthesiologist throughout the procedure.    A midsize flexible upper endoscope was used for the procedure.    FINDINGS:  Patient was placed in the left lateral decubitus position.  After   sedation was achieved, the endoscope was passed into the posterior pharynx   under direct visualization and advanced to the second portion of the duodenum   without difficulty.  The patient tolerated the procedure well with the   following findings:  1.  Esophagus is normal.  2.  Stomach is normal.  3.  Duodenum is normal.  A good view of the ampulla was obtained and found to   be normal.    IMPRESSION:  Normal upper endoscopy.    PLAN:  Proceed with colonoscopy.       ____________________________________     FREDI TREJO MD    ELLI / NTS    DD:  04/15/2018 11:42:47  DT:  04/15/2018 11:51:02    D#:  [id number]  Job#:  [id number]

## 2018-04-15 NOTE — PROGRESS NOTES
Pt returned from surgery. Alert and oriented x 4. C/o pain in her RUQ of an 8/10. Medicated with po oxycodone. PIV to R FA painful with flushing. Discontinued. New PIV initiated to R FA. Fluids infusing. VSS. Pt resting comfortably. Call light within reach. Hourly rounding in place.

## 2018-04-15 NOTE — PROGRESS NOTES
Brief GI Pre-procedure Note.    No changes overnight.  Tolerated bowel prep.  OK for EGD and colonoscopy with anesthesia today.  Further management depending on endoscopic findings.

## 2018-04-16 PROBLEM — F32.A DEPRESSION: Status: ACTIVE | Noted: 2018-01-01

## 2018-04-16 NOTE — CONSULTS
DATE OF SERVICE:  04/15/2018    REASON FOR CONSULTATION:  Adenocarcinoma, biopsy proven liver, unknown   primary.    HISTORY OF PRESENT ILLNESS:  This is a very pleasant 63-year-old who was   transferred to [facility] for further workup of her liver.  She   also had some abdominal discomfort and nausea.  She is followed as an   outpatient with Dr. Echeverria.  Now, she presented originally on 04/13/2018 the   outside hospital with increasing nausea and vomiting and abdominal   discomfort.  She had a liver biopsy that was done.  I do not have the actual   path report, but this was in April and it came back as a poorly differentiated   adenocarcinoma.  She was transferred for further workup and repeat upper and   lower endoscopy because it was favoring GI origin.  The tumor itself was   reported positive for CK20, negative CK7 and CDX-2, negative for TTF-1,   Napsin, so favoring more of a colon or pancreas.    She tells me her past medical history is significant for the fact of uterine   cancer at age 23.  She said it was definitely uterine cancer.  She had a   hysterectomy.  She also had a left oophorectomy.  She said the right ovary is   probably still in there.  She also has issues with the blood transfusions.    She is not Jehovah witness.  She tells me she would most likely take it if she   had to, but she just is concerned about any _____ diseases.  She has family   history of ovarian cancer in a cousin in her 50, she passed away; another one,   who had cervical cancer.  No breast or GI malignancies.  She has 2 kids, 2   boys.  She quits her smoking off and on, but she has been smoking since age   16, so many-pack year history.  She is a nondrinker.    Currently, review of system wise, she did have some nausea and vomiting, but   that has controlled.  Her pain seems to be better and she has had some mild   weight loss, but otherwise she feels okay.    PHYSICAL EXAMINATION:  HEENT:  KURT.  NECK:   Supple.  LUNGS:  Clear bilaterally.  CARDIOVASCULAR:  Regular rate and rhythm.  Normal S1 and S2.  ABDOMEN:  Soft, nontender.  No rebound tenderness.  Bowel sounds are present.  EXTREMITIES:  No clubbing, cyanosis, or edema.  NEUROLOGIC:  Otherwise, intact.  No lymphadenopathy on exam.  Marisa, the   nurse was in the room.  I did a breast exam.  I could not palpate any masses.    No skin changes or adenopathy.  She had chronic inverted nipple, she tells   me.    She has only had one mammogram in her lifetime.    IMPRESSION AND PLAN:  A 63-year-old ECOG performance status is 0, who has an   obvious biopsy of her liver which shows a poorly differentiated adenocarcinoma   reported with CK20 positive and everything else is essentially negative.  She   had tumor markers that were ordered and her CA-125 was 179.  She otherwise is   stable at this time.  She is doing her prep and she is going to be getting   upper and lower endoscopy.    I told her because most of her studies are noncontrast studies, we really need   to have good test for contrast.  She does have an allergy to IODINE, but she   understands that one can get a steroid prep and we could even consider a PET   CT as an outpatient.  She said she would consider all these things.  I told   her I think we really need to be confident on where we think the etiology   could be from.  She may need a repeat biopsy depending on how much tissue the   original biopsy has got.  I told her a lot of times now, we are looking at   more molecular testing.  I want to have enough tissue, so we can PDL-1   staining, microsatellite instability staining, ER/NJ if needed and foundation   1 testing if needed.  I think we need to do good scan to show any other sites   of disease that could potentially be biopsied if we needed as well.  She   understands that I will be going off service, but my partner, Dr. Ricardo Zaldivar   will be coming on, and will be following her through this week  and will see   what we discover.  I answered all her questions.  I will add CA27.29 since   that was not done.       ____________________________________     MD YAZAN Oleary / EMPERATRIZ    DD:  04/15/2018 16:52:45  DT:  04/15/2018 18:02:59    D#:  [id number]  Job#:  [id number]    cc: Dr. Juwan Quezada DO

## 2018-04-16 NOTE — PROGRESS NOTES
[facility] Hospitalist Progress Note    Date of Service: 4/15/2018    Chief Complaint  63 y.o. female admitted 2018 with poorly differentiated adenocarcinoma, direct admission from OSH and after GI contacted, recommended transfer to [facility]    Interval Problem Update   Patient feeling nauseated and having diarrhea secondary to bowel prep she has started for upper and lower endoscopies for further evaluation of her adenocarcinoma from liver biopsy.    4/15 Patient without significant change in symptoms, responding well to pain medication.  She had EGD and C-scope done today without obvious abnormality nor tumor location.  Patient with history of uterine cancer in 70's.  Images reviewed and huge mass arising from inferior portion of the liver.    Consultants/Specialty  [facility] - GI  Oncology - [place]    Disposition  tbd        Review of Systems   Constitutional: Negative for chills and fever.   HENT: Negative for congestion.    Eyes: Negative for blurred vision and photophobia.   Respiratory: Negative for cough and shortness of breath.    Cardiovascular: Negative for chest pain, claudication and leg swelling.   Gastrointestinal: Positive for abdominal pain, diarrhea (with bowel prep), nausea and vomiting. Negative for constipation and heartburn.   Genitourinary: Negative for dysuria and hematuria.   Musculoskeletal: Negative for joint pain and myalgias.   Skin: Negative for itching and rash.   Neurological: Negative for dizziness, sensory change, speech change, weakness and headaches.   Psychiatric/Behavioral: Negative for depression. The patient is not nervous/anxious and does not have insomnia.       Physical Exam  Laboratory/Imaging   Hemodynamics  Temp (24hrs), Av.7 °C (98 °F), Min:36.1 °C (97 °F), Max:37 °C (98.6 °F)   Temperature: 36.7 °C (98.1 °F)  Pulse  Av  Min: 70  Max: 94 Heart Rate (Monitored): 80  Blood Pressure: 112/63, NIBP: 137/77      Respiratory      Respiration: 19, Pulse Oximetry: 95  %        RUL Breath Sounds: Diminished, RML Breath Sounds: Diminished, RLL Breath Sounds: Diminished, JACQUELYN Breath Sounds: Diminished, LLL Breath Sounds: Diminished    Fluids    Intake/Output Summary (Last 24 hours) at 04/15/18 1720  Last data filed at 04/15/18 1649   Gross per 24 hour   Intake             3371 ml   Output                0 ml   Net             3371 ml       Nutrition  Orders Placed This Encounter   Procedures   • DIET ORDER     Standing Status:   Standing     Number of Occurrences:   1     Order Specific Question:   Diet:     Answer:   Regular [1]     Physical Exam   Constitutional: She is oriented to person, place, and time. She appears well-developed and well-nourished. No distress.   HENT:   Head: Normocephalic and atraumatic.   Eyes: Conjunctivae are normal. No scleral icterus.   Neck: Neck supple. No JVD present.   Cardiovascular: Normal rate, regular rhythm and normal heart sounds.  Exam reveals no gallop and no friction rub.    No murmur heard.  Pulmonary/Chest: Effort normal and breath sounds normal. No respiratory distress. She has no wheezes. She exhibits no tenderness.   Abdominal: Soft. Bowel sounds are normal. She exhibits no distension. There is no tenderness. There is no guarding.   Musculoskeletal: She exhibits no edema or tenderness.   Neurological: She is alert and oriented to person, place, and time. No cranial nerve deficit.   Skin: Skin is warm and dry. She is not diaphoretic. No erythema. No pallor.   Psychiatric: She has a normal mood and affect. Her behavior is normal.   Nursing note and vitals reviewed.      Recent Labs      04/14/18   0056   WBC  9.4   RBC  3.77*   HEMOGLOBIN  12.8   HEMATOCRIT  38.4   MCV  101.9*   MCH  34.0*   MCHC  33.3*   RDW  47.6   PLATELETCT  224   MPV  10.2     Recent Labs      04/14/18   0056   SODIUM  136   POTASSIUM  3.7   CHLORIDE  104   CO2  25   GLUCOSE  103*   BUN  15   CREATININE  0.70   CALCIUM  8.9                      Assessment/Plan     *  Intractable nausea and vomiting   Assessment & Plan    The patient reports chronic nausea and vomiting since last fall when she was diagnosed with a liver mass.   It has worsened in the last several days and she has been unable to control her symptoms at home.   IV anti-emetics to control her nausea.  Regular diet        Abdominal pain   Assessment & Plan    As needed pain medication          Adenocarcinoma (CMS-HCC)   Assessment & Plan    Patient underwent biopsy showing poorly differentiated adenocarcinoma. Path report reviewed with Dr Campbell from their office records  Upper and lower endoscopy completed 4/15 - no evidence of mass nor malignancy  MRI c/s abdomen/pelvis pending.   and CEA elevated, CA 19-9 normal          HTN (hypertension)   Assessment & Plan    She is not on any medications at home for blood pressure  PRN IV antihypertensives and we will assess for continued need.        Tobacco abuse   Assessment & Plan    cessation counseling, nicotine replacement ordered          Quality-Core Measures   Reviewed items::  Labs reviewed, Medications reviewed and Radiology images reviewed  Duarte catheter::  No Duarte  DVT prophylaxis - mechanical:  SCDs

## 2018-04-16 NOTE — PROGRESS NOTES
Gastroenterology Progress Note     Author: Fredi Guzmanterman   Date & Time Created: 4/16/2018 1:06 PM    Chief Complaint:  RUQ pain, poorly differentiated adenocarcinoma of liver    Interval History:  Initial history:  63-year-old female with history of prior uterine cancer in the 1970s, status post hysterectomy, hypertension, and hyperlipidemia who has been evaluated by Dr. Echeverria as an outpatient and found to have a large liver mass consistent with poorly differentiated adenocarcinoma of unclear origin.   She began having some problems with right upper quadrant abdominal pain late in 2017.  There was initial workup for gallbladder disease.  She apparently had an ultrasound in November 2017 showing a large solid mass in the right lobe of the liver.  She has undergone additional imaging, although all imaging has been without contrast that she refuses any contrast with both CT and MRI given her history of an IODINE ALLERGY.  Previous imaging with MRI without contrast showed a 9.4 cm lesion in segment 6 with a trace amount of ascites.  She then underwent a CT-guided biopsy just recently of this lesion.  This showed a poorly differentiated adenocarcinoma, CK20 positive and CK7 negative concerning for GI origin.  She did have a colonoscopy in April 2017 by Dr. Echeverria, which showed a small polyp only.   Other than her right upper quadrant abdominal pain, she has had some bilious emesis as well as slight change in bowel habits with more constipation.  She notes weight loss of 20 pounds.  Denies fevers or chills.  Denies hematemesis, melena, or hematochezia.  She was scheduled to have an outpatient upper endoscopy and colonoscopy by Dr. Echeverria, but unfortunately she presented for severe abdominal pain.  Her pain is now better controlled.  She is tolerating liquids.    4/15/2018: No issues overnight.  Still with pain and nausea but tolerated prep.  4/16/2018: Had EGD and colonoscopy yesterday all of which were  unrevealing.  Scheduled for MRI to look at pancreas and other abdominal structures.    Review of Systems:  Review of Systems   Constitutional: Negative for chills and fever.   Respiratory: Negative for shortness of breath.    Cardiovascular: Negative for chest pain.   Gastrointestinal: Positive for abdominal pain, diarrhea and nausea. Negative for blood in stool, melena and vomiting.       Physical Exam:  Physical Exam   Constitutional: She is oriented to person, place, and time.   Cardiovascular: Normal rate and regular rhythm.    Pulmonary/Chest: Effort normal and breath sounds normal.   Abdominal: Soft. Bowel sounds are normal. She exhibits mass. She exhibits no distension. There is tenderness.   Musculoskeletal: She exhibits no edema.   Neurological: She is alert and oriented to person, place, and time.   Nursing note and vitals reviewed.      Labs:        Invalid input(s): [id number]      Recent Labs      04/14/18   0056  04/15/18   2357   SODIUM  136  137   POTASSIUM  3.7  3.7   CHLORIDE  104  106   CO2  25  23   BUN  15  8   CREATININE  0.70  0.54   CALCIUM  8.9  9.0     Recent Labs      04/14/18   0056  04/15/18   2357   ALTSGPT  24  20   ASTSGOT  55*  54*   ALKPHOSPHAT  153*  132*   TBILIRUBIN  0.4  0.5   GLUCOSE  103*  101*     Recent Labs      04/14/18   0056  04/15/18   2357   RBC  3.77*  3.83*   HEMOGLOBIN  12.8  12.9   HEMATOCRIT  38.4  38.8   PLATELETCT  224  225     Recent Labs      04/14/18   0056  04/15/18   2357   WBC  9.4  11.0*   NEUTSPOLYS   --   77.90*   LYMPHOCYTES   --   12.70*   MONOCYTES   --   7.60   EOSINOPHILS   --   1.10   BASOPHILS   --   0.30   ASTSGOT  55*  54*   ALTSGPT  24  20   ALKPHOSPHAT  153*  132*   TBILIRUBIN  0.4  0.5     Hemodynamics:  Temp (24hrs), Av.8 °C (98.3 °F), Min:36.3 °C (97.3 °F), Max:37.2 °C (98.9 °F)  Temperature: 36.3 °C (97.3 °F)  Pulse  Av.8  Min: 70  Max: 94   Blood Pressure: 125/70     Respiratory:    Respiration: 16, Pulse Oximetry: 94 %         RUL Breath Sounds: Clear, RML Breath Sounds: Diminished, RLL Breath Sounds: Diminished, JACQUELYN Breath Sounds: Clear, LLL Breath Sounds: Diminished  Fluids:    Intake/Output Summary (Last 24 hours) at 04/15/18 1130  Last data filed at 04/15/18 0818   Gross per 24 hour   Intake             1730 ml   Output                0 ml   Net             1730 ml        GI/Nutrition:  Orders Placed This Encounter   Procedures   • DIET ORDER     Standing Status:   Standing     Number of Occurrences:   1     Order Specific Question:   Diet:     Answer:   Regular [1]     Medical Decision Making, by Problem:  Active Hospital Problems    Diagnosis   • *Intractable nausea and vomiting [R11.2]   • Tobacco abuse [Z72.0]   • HTN (hypertension) [I10]   • Adenocarcinoma (CMS-HCC) [C80.1]   • Abdominal pain [R10.9]       Impression / Plan:  1.  Poorly differentiated adenocarcinoma on liver mass biopsy, suspect GI origin based on staining from liver mass biopsies.  No obvious primary lesions on upper GI tract or colon  2.  Right upper quadrant pain, likely secondary to liver mass  3.  Nausea and vomiting  4.  Weight loss  5.  Mildly elevated liver tests likely secondary to liver mass  6.  Hypertension  7.  Hyperlipidemia  8.  History of uterine cancer  9.  Anxiety and depression  10.  Chronic pain    1.  MRI with and without contrast abdomen - pending  2. Oncology consultation apprciated  3. Advance diet     **  GI TO SIGN OFF / STAND BY - PLEASE CALL IF ANY CONCERNS  **    Quality-Core Measures   Reviewed items::  Labs reviewed, Medications reviewed and Radiology images reviewed

## 2018-04-16 NOTE — DIETARY
Nutrition Services: Day 3 of admit.  Tonya Collins is a 63 y.o. female with admitting DX of RIGHT UPPER QUADRANT MASS, Liver mass  Consult received for Poor PO & Wt Loss on Nutrition Admit Screen     Assessment:  Ht: 172.7 cm, Wt 4/14: 63.7 kg via stand up scale, BMI: 21.35  Diet/Intake: Regular - Per chart pt PO 50-75% 1 meal documented on regular diet     Evaluation:   1. Attempted to visit pt, pt was sleeping and would not arouse  2. Per H&P pt with complaints of worsening nausea, vomiting and abdominal pain  3. Per chart review pt's wt on 2/28 was 146 lbs (66.4 kg), wt loss percent is 4.1 in about 6 weeks, which is not signifcant but worth noting.   4. Pt is receiving Boost Breeze BID  5. Meds: reglan  6. NS infusion @ 100 ml/hr  7. Last BM: 4/15    Recommendations/Plan:  1. Encourage intake of meals  2. Document intake of all meals as % taken in ADL's to provide interdisciplinary communication across all shifts.   3. Monitor weight.  4. Nutrition rep will continue to see patient for ongoing meal and snack preferences.    RD following

## 2018-04-16 NOTE — PROGRESS NOTES
Oncology/Hematology Progress Note               Author: Ricardo Zaldivar Date & Time created: 4/16/2018  2:11 PM     Interval History:  Upper and lower endoscopies were negative for a primary lesion.  Pathology from liver biopsy-poorly differentiated adenocarcinoma favoring a GI origin    Review of Systems:  Review of Systems   Constitutional: Negative.    HENT: Negative.    Eyes: Negative.    Respiratory: Negative.    Cardiovascular: Negative.    Gastrointestinal: Negative.    Genitourinary: Negative.    Musculoskeletal: Negative.    Skin: Negative.    Neurological: Negative.        Physical Exam:  Physical Exam   Constitutional: She is oriented to person, place, and time. She appears well-developed and well-nourished.   HENT:   Head: Normocephalic.   Eyes: EOM are normal. Pupils are equal, round, and reactive to light.   Neck: Neck supple.   Cardiovascular: Normal rate and regular rhythm.    Pulmonary/Chest: Effort normal and breath sounds normal.   Abdominal: Soft. Bowel sounds are normal.   Musculoskeletal: She exhibits no edema.   Lymphadenopathy:     She has no cervical adenopathy.   Neurological: She is alert and oriented to person, place, and time.       Labs:        Invalid input(s): [id number]      Recent Labs      04/14/18   0056  04/15/18   2357   SODIUM  136  137   POTASSIUM  3.7  3.7   CHLORIDE  104  106   CO2  25  23   BUN  15  8   CREATININE  0.70  0.54   CALCIUM  8.9  9.0     Recent Labs      04/14/18   0056  04/15/18   2357   ALTSGPT  24  20   ASTSGOT  55*  54*   ALKPHOSPHAT  153*  132*   TBILIRUBIN  0.4  0.5   GLUCOSE  103*  101*     Recent Labs      04/14/18   0056  04/15/18   2357   RBC  3.77*  3.83*   HEMOGLOBIN  12.8  12.9   HEMATOCRIT  38.4  38.8   PLATELETCT  224  225     Recent Labs      04/14/18   0056  04/15/18   2357   WBC  9.4  11.0*   NEUTSPOLYS   --   77.90*   LYMPHOCYTES   --   12.70*   MONOCYTES   --   7.60   EOSINOPHILS   --   1.10   BASOPHILS   --   0.30   ASTSGOT  55*  54*    ALTSGPT  24  20   ALKPHOSPHAT  153*  132*   TBILIRUBIN  0.4  0.5     Recent Labs      18   0056  04/15/18   2357   SODIUM  136  137   POTASSIUM  3.7  3.7   CHLORIDE  104  106   CO2  25  23   GLUCOSE  103*  101*   BUN  15  8   CREATININE  0.70  0.54   CALCIUM  8.9  9.0     Hemodynamics:  Temp (24hrs), Av.8 °C (98.3 °F), Min:36.3 °C (97.3 °F), Max:37.2 °C (98.9 °F)  Temperature: 36.3 °C (97.3 °F)  Pulse  Av.8  Min: 70  Max: 94   Blood Pressure: 125/70     Respiratory:    Respiration: 16, Pulse Oximetry: 94 %        RUL Breath Sounds: Clear, RML Breath Sounds: Diminished, RLL Breath Sounds: Diminished, JACQUELYN Breath Sounds: Clear, LLL Breath Sounds: Diminished  Fluids:    Intake/Output Summary (Last 24 hours) at 18 1411  Last data filed at 04/15/18 1649   Gross per 24 hour   Intake             1881 ml   Output                0 ml   Net             1881 ml        GI/Nutrition:  Orders Placed This Encounter   Procedures   • DIET ORDER     Standing Status:   Standing     Number of Occurrences:   1     Order Specific Question:   Diet:     Answer:   Regular [1]     Medical Decision Making, by Problem:  Active Hospital Problems    Diagnosis   • *Intractable nausea and vomiting [R11.2]   • Tobacco abuse [Z72.0]   • HTN (hypertension) [I10]   • Adenocarcinoma (CMS-HCC) [C80.1]   • Abdominal pain [R10.9]       Plan:  1. Metastatic poorly differentiated adenocarcinoma which immunohistochemically appears to be from a gastrointestinal origin. I did discuss the pathology with the pathologist, Dr. Escoto. Unfortunately, most of the biopsy was necrotic tissue with very little tissue for further diagnostic studies. She has an elevated CA-125 of 179 and a negative CEA and CA 19-9. She has had a hysterectomy when she was 23 which sounds as though she had a total hysterectomy since she had hot flashes ever since. There is no family history of GI malignancies or breast or ovarian cancer. She will be having an MRI of  her abdomen procedure can locate a possible primary or other lesion to biopsy. She has multiple lesions in her liver many of which do look necrotic. We might require further tissue for more pathologic evaluation with immunohistochemical stains and possibly Foundation one analysis for treatment options    Quality-Core Measures

## 2018-04-17 NOTE — PROGRESS NOTES
"/70   Pulse 75   Temp 36.6 °C (97.9 °F)   Resp 16   Ht 1.727 m (5' 8\")   Wt 63.7 kg (140 lb 6.9 oz)   LMP 01/01/1980 (Within Months)   SpO2 93%   Breastfeeding? No   BMI 21.35 kg/m²   PT is AOx4.  8/10 pain, given oxycodone, 10 mg per MAR. Went down for MRI from 2959-5186. Nausea following MRI, given 4 mg zofran per MAR.   "

## 2018-04-17 NOTE — CARE PLAN
Problem: Communication  Goal: The ability to communicate needs accurately and effectively will improve  Outcome: PROGRESSING AS EXPECTED  Patient has been alert and oriented x4, able to make needs known.  Some nausea today, given one zofran oral tab besides scheduled reglan.  Continues to have pain in abdomen, using oxycodone 10mg every 3 hours this is effective.  Patient ambulating in room independently.   She was tearful this morning and feeling down, restarted back on wellbutrin per Dr. Melara.

## 2018-04-17 NOTE — PROGRESS NOTES
Oncology/Hematology Progress Note               Author: Ricardo Zaldivar Date & Time created: 4/17/2018  12:05 PM     Interval History:  Upper and lower endoscopies were negative for a primary lesion.  Pathology from liver biopsy-poorly differentiated adenocarcinoma favoring a GI origin  MRI abdomen-numerous liver metastases with central necrosis. No evidence of any primary  -Alpha-fetoprotein-4119!    Review of Systems:  Review of Systems   Constitutional: Negative.    HENT: Negative.    Eyes: Negative.    Respiratory: Negative.    Cardiovascular: Negative.    Gastrointestinal: Negative.    Genitourinary: Negative.    Musculoskeletal: Negative.    Skin: Negative.    Neurological: Negative.        Physical Exam:  Physical Exam   Constitutional: She is oriented to person, place, and time. She appears well-developed and well-nourished.   HENT:   Head: Normocephalic.   Eyes: EOM are normal. Pupils are equal, round, and reactive to light.   Neck: Neck supple.   Cardiovascular: Normal rate and regular rhythm.    Pulmonary/Chest: Effort normal and breath sounds normal.   Abdominal: Soft. Bowel sounds are normal.   Musculoskeletal: She exhibits no edema.   Lymphadenopathy:     She has no cervical adenopathy.   Neurological: She is alert and oriented to person, place, and time.       Labs:        Invalid input(s): [id number]      Recent Labs      04/15/18   2357  04/16/18   2356   SODIUM  137  138   POTASSIUM  3.7  3.6   CHLORIDE  106  106   CO2  23  24   BUN  8  7*   CREATININE  0.54  0.59   CALCIUM  9.0  8.7     Recent Labs      04/15/18   2357  04/16/18   2356   ALTSGPT  20  18   ASTSGOT  54*  53*   ALKPHOSPHAT  132*  121*   TBILIRUBIN  0.5  0.4   GLUCOSE  101*  97     Recent Labs      04/15/18   2357  04/16/18   2356   RBC  3.83*  3.72*   HEMOGLOBIN  12.9  12.5   HEMATOCRIT  38.8  37.6   PLATELETCT  225  220     Recent Labs      04/15/18   2357  04/16/18   2356   WBC  11.0*  10.3   NEUTSPOLYS  77.90*  76.00*    LYMPHOCYTES  12.70*  13.60*   MONOCYTES  7.60  8.80   EOSINOPHILS  1.10  0.80   BASOPHILS  0.30  0.40   ASTSGOT  54*  53*   ALTSGPT  20  18   ALKPHOSPHAT  132*  121*   TBILIRUBIN  0.5  0.4     Recent Labs      04/15/18   2357  18   2356   SODIUM  137  138   POTASSIUM  3.7  3.6   CHLORIDE  106  106   CO2  23  24   GLUCOSE  101*  97   BUN  8  7*   CREATININE  0.54  0.59   CALCIUM  9.0  8.7     Hemodynamics:  Temp (24hrs), Av.8 °C (98.3 °F), Min:36.6 °C (97.8 °F), Max:37.2 °C (98.9 °F)  Temperature: 36.6 °C (97.9 °F)  Pulse  Av.7  Min: 70  Max: 94   Blood Pressure: 132/70     Respiratory:    Respiration: 16, Pulse Oximetry: 93 %        RUL Breath Sounds: Clear, RML Breath Sounds: Clear, RLL Breath Sounds: Clear, JACQUELYN Breath Sounds: Clear, LLL Breath Sounds: Clear  Fluids:    Intake/Output Summary (Last 24 hours) at 18 1411  Last data filed at 04/15/18 1649   Gross per 24 hour   Intake             1881 ml   Output                0 ml   Net             1881 ml        GI/Nutrition:  Orders Placed This Encounter   Procedures   • DIET ORDER     Standing Status:   Standing     Number of Occurrences:   1     Order Specific Question:   Diet:     Answer:   Regular [1]     Medical Decision Making, by Problem:  Active Hospital Problems    Diagnosis   • *Intractable nausea and vomiting [R11.2]   • Tobacco abuse [Z72.0]   • HTN (hypertension) [I10]   • Adenocarcinoma (CMS-HCC) [C80.1]   • Abdominal pain [R10.9]       Plan:  1. Metastatic poorly differentiated adenocarcinoma which immunohistochemically appears to be from a gastrointestinal origin. I did discuss the pathology with the pathologist, Dr. Escoto. Unfortunately, most of the biopsy was necrotic tissue with very little tissue for further diagnostic studies. She has an elevated CA-125 of 179 and a negative CEA and CA 19-9. She has had a hysterectomy when she was 23 which sounds as though she had a total hysterectomy since she had hot flashes ever since.  There is no family history of GI malignancies or breast or ovarian cancer. She will be having an MRI of her abdomen procedure can locate a possible primary or other lesion to biopsy. She has multiple lesions in her liver many of which do look necrotic. We might require further tissue for more pathologic evaluation with immunohistochemical stains and possibly [facility] one analysis for treatment options  MRI of the liver shows only the numerous multiple hepatic metastases with a large ones having significant necrosis. There is no evidence of a primary cancer. Remarkably, her alpha-fetoprotein is elevated at 4119. I spoke with the radiologist, Dr. Booker, he said that the picture is not typical for hepatocellular carcinoma but it is certainly a possibility. Necrosis is somewhat unusual. I will discuss with Dr. Aleman whether we want to have a another biopsy performed with more tissue for further IHC studies as well as to [facility]. She is otherwise feeling well and would like to be discharged home soon.    Quality-Core Measures

## 2018-04-17 NOTE — PROGRESS NOTES
[facility] Hospitalist Progress Note    Date of Service: 4/16/2018    Chief Complaint  63 y.o. female admitted 4/13/2018 with poorly differentiated adenocarcinoma, direct admission from OSH and after GI contacted, recommended transfer to [facility]    Interval Problem Update  4/14 Patient feeling nauseated and having diarrhea secondary to bowel prep she has started for upper and lower endoscopies for further evaluation of her adenocarcinoma from liver biopsy.    4/15 Patient without significant change in symptoms, responding well to pain medication.  She had EGD and C-scope done today without obvious abnormality nor tumor location.  Patient with history of uterine cancer in 70's.  Images reviewed and huge mass arising from inferior portion of the liver.  4/16 Patient feeling okay from pain perspective but is tearful and requests her antidepressant be restarted.  Wellbutrin was not resumed on admission.  Gabapentin also restarted today.  Scopes from yesterday were not revealing and MRI with contrast is pending to determine if adenocarcinoma of GI origin as suspected [facility] report [facility] or from gyn origin given previous history of this.    Consultants/Specialty  [facility] - GI  Oncology - Aleman/Zaldivar    Disposition  tbd        Review of Systems   Constitutional: Negative for chills and fever.   HENT: Negative for congestion.    Eyes: Negative for blurred vision and photophobia.   Respiratory: Negative for cough and shortness of breath.    Cardiovascular: Negative for chest pain, claudication and leg swelling.   Gastrointestinal: Positive for abdominal pain and nausea. Negative for constipation, diarrhea, heartburn and vomiting.   Genitourinary: Negative for dysuria and hematuria.   Musculoskeletal: Negative for joint pain and myalgias.   Skin: Negative for itching and rash.   Neurological: Negative for dizziness, sensory change, speech change, weakness and headaches.   Psychiatric/Behavioral: Positive  for depression (tearful). The patient is not nervous/anxious and does not have insomnia.       Physical Exam  Laboratory/Imaging   Hemodynamics  Temp (24hrs), Av.9 °C (98.5 °F), Min:36.3 °C (97.3 °F), Max:37.2 °C (98.9 °F)   Temperature: 37.1 °C (98.7 °F)  Pulse  Av.9  Min: 70  Max: 94    Blood Pressure: 137/75      Respiratory      Respiration: 16, Pulse Oximetry: 94 %        RUL Breath Sounds: Clear, RML Breath Sounds: Diminished, RLL Breath Sounds: Diminished, JACQUELYN Breath Sounds: Clear, LLL Breath Sounds: Diminished    Fluids    Intake/Output Summary (Last 24 hours) at 18 1905  Last data filed at 18 1800   Gross per 24 hour   Intake             2503 ml   Output                0 ml   Net             2503 ml       Nutrition  Orders Placed This Encounter   Procedures   • DIET ORDER     Standing Status:   Standing     Number of Occurrences:   1     Order Specific Question:   Diet:     Answer:   Regular [1]     Physical Exam   Constitutional: She is oriented to person, place, and time. She appears well-developed and well-nourished. No distress.   HENT:   Head: Normocephalic and atraumatic.   Eyes: Conjunctivae are normal. No scleral icterus.   Neck: Neck supple. No JVD present.   Cardiovascular: Normal rate, regular rhythm and normal heart sounds.  Exam reveals no gallop and no friction rub.    No murmur heard.  Pulmonary/Chest: Effort normal and breath sounds normal. No respiratory distress. She has no wheezes. She exhibits no tenderness.   Abdominal: Soft. Bowel sounds are normal. She exhibits no distension. There is no tenderness. There is no guarding.   Musculoskeletal: She exhibits no edema or tenderness.   Neurological: She is alert and oriented to person, place, and time. No cranial nerve deficit.   Skin: Skin is warm and dry. She is not diaphoretic. No erythema. No pallor.   Psychiatric: She has a normal mood and affect. Her behavior is normal.   Nursing note and vitals reviewed.       Recent Labs      04/14/18   0056  04/15/18   2357   WBC  9.4  11.0*   RBC  3.77*  3.83*   HEMOGLOBIN  12.8  12.9   HEMATOCRIT  38.4  38.8   MCV  101.9*  101.3*   MCH  34.0*  33.7*   MCHC  33.3*  33.2*   RDW  47.6  46.7   PLATELETCT  224  225   MPV  10.2  10.4     Recent Labs      04/14/18 0056  04/15/18   2357   SODIUM  136  137   POTASSIUM  3.7  3.7   CHLORIDE  104  106   CO2  25  23   GLUCOSE  103*  101*   BUN  15  8   CREATININE  0.70  0.54   CALCIUM  8.9  9.0                      Assessment/Plan     * Intractable nausea and vomiting   Assessment & Plan    No emesis but does not have appetite  The patient reports chronic nausea and vomiting since last fall when she was diagnosed with a liver mass.   It has worsened in the last several days and she has been unable to control her symptoms at home.   IV anti-emetics to control her nausea.  Regular diet        Depression   Assessment & Plan    Resume wellbutrin        Abdominal pain   Assessment & Plan    As needed pain medication          Adenocarcinoma (CMS-HCC)   Assessment & Plan    Patient underwent biopsy showing poorly differentiated adenocarcinoma. Path report reviewed with Dr Campbell from their office records  Upper and lower endoscopy completed 4/15 - no evidence of mass nor malignancy  MRI c/s abdomen/pelvis pending.   and CEA elevated, CA 19-9 normal          HTN (hypertension)   Assessment & Plan    She is not on any medications at home for blood pressure  PRN IV antihypertensives and we will assess for continued need.        Neuropathy (CMS-HCC)- (present on admission)   Assessment & Plan    Resume gabapentin          Tobacco abuse   Assessment & Plan    cessation counseling, nicotine replacement ordered          Quality-Core Measures   Reviewed items::  Labs reviewed, Medications reviewed and Radiology images reviewed  Duarte catheter::  No Duarte  DVT prophylaxis - mechanical:  SCDs

## 2018-04-17 NOTE — DISCHARGE PLANNING
Clinical Note per Chart Review:   CC: Direct admit from Dr. Tarango at [facility].  Intractable N/V in the setting of known adenocarcinoma.      PMH: Liver Mass, HPL Malignant neoplasm of the uterus and ovaries.  Hysterectomy, appendectomy, abdominal reconstruction.      Reglan 10 mg IVP every 6 hrs.   IVF @100 ml/hr  MRI Abd- Pending    Ca 125-173.1  AFP-4119  CEA-3.6    Discharge Plan:   SW to continue with discharge plan as appropriate.

## 2018-04-17 NOTE — PROGRESS NOTES
[facility] Hospitalist Progress Note    Date of Service: 4/17/2018    Chief Complaint  63 y.o. female admitted 4/13/2018 with poorly differentiated adenocarcinoma, direct admission from OSH and after GI contacted, recommended transfer to [facility]    Interval Problem Update  4/14 Patient feeling nauseated and having diarrhea secondary to bowel prep she has started for upper and lower endoscopies for further evaluation of her adenocarcinoma from liver biopsy.    4/15 Patient without significant change in symptoms, responding well to pain medication.  She had EGD and C-scope done today without obvious abnormality nor tumor location.  Patient with history of uterine cancer in 70's.  Images reviewed and huge mass arising from inferior portion of the liver.  4/16 Patient feeling okay from pain perspective but is tearful and requests her antidepressant be restarted.  Wellbutrin was not resumed on admission.  Gabapentin also restarted today.  Scopes from yesterday were not revealing and MRI with contrast is pending to determine if adenocarcinoma of GI origin as suspected [facility] report [facility] or from gyn origin given previous history of this.  4/17: AFP 4119, MRI abdomen showed multiple necrotic liver masses possibly metastases. Onc Zen discussed with patient and will check with onc ready to see if patient needs another biopsy. N.p.o. midnight in case she does.    Consultants/Specialty  [facility] - GI  Oncology - Ash/Zen    Disposition  tbd        Review of Systems   Constitutional: Negative for chills and fever.        Decreased appetite   HENT: Negative for congestion.    Respiratory: Negative for cough and shortness of breath.    Cardiovascular: Negative for chest pain.   Gastrointestinal: Positive for abdominal pain and nausea. Negative for constipation, diarrhea, heartburn and vomiting.   Genitourinary: Negative for dysuria and hematuria.   Musculoskeletal: Positive for neck pain. Negative for  joint pain and myalgias.   Skin: Negative for itching and rash.   Neurological: Negative for dizziness, sensory change, weakness and headaches.   Psychiatric/Behavioral: Positive for depression (tearful). The patient is not nervous/anxious and does not have insomnia.       Physical Exam  Laboratory/Imaging   Hemodynamics  Temp (24hrs), Av.8 °C (98.3 °F), Min:36.6 °C (97.8 °F), Max:37.2 °C (98.9 °F)   Temperature: 36.6 °C (97.9 °F)  Pulse  Av.7  Min: 70  Max: 94    Blood Pressure: 132/70      Respiratory      Respiration: 16, Pulse Oximetry: 93 %        RUL Breath Sounds: Clear, RML Breath Sounds: Clear, RLL Breath Sounds: Clear, JACQUELYN Breath Sounds: Clear, LLL Breath Sounds: Clear    Fluids    Intake/Output Summary (Last 24 hours) at 18 1542  Last data filed at 18 1800   Gross per 24 hour   Intake             2503 ml   Output                0 ml   Net             2503 ml       Nutrition  Orders Placed This Encounter   Procedures   • DIET ORDER     Standing Status:   Standing     Number of Occurrences:   1     Order Specific Question:   Diet:     Answer:   Regular [1]     Physical Exam   Constitutional: She is oriented to person, place, and time. She appears well-developed and well-nourished. No distress.   HENT:   Head: Normocephalic and atraumatic.   Eyes: Conjunctivae are normal.   Cardiovascular: Normal rate, regular rhythm and normal heart sounds.  Exam reveals no gallop.    Pulmonary/Chest: Effort normal and breath sounds normal. No respiratory distress. She has no wheezes. She exhibits no tenderness.   Abdominal: Soft. Bowel sounds are normal. She exhibits distension. There is tenderness. There is no rebound and no guarding.   Musculoskeletal: She exhibits no edema or tenderness.   Neurological: She is alert and oriented to person, place, and time. No cranial nerve deficit.   Skin: Skin is warm and dry. She is not diaphoretic. No erythema. No pallor.   Psychiatric: She has a normal mood and  affect. Her behavior is normal.   Nursing note and vitals reviewed.      Recent Labs      04/15/18   2357  04/16/18   2356   WBC  11.0*  10.3   RBC  3.83*  3.72*   HEMOGLOBIN  12.9  12.5   HEMATOCRIT  38.8  37.6   MCV  101.3*  101.1*   MCH  33.7*  33.6*   MCHC  33.2*  33.2*   RDW  46.7  46.7   PLATELETCT  225  220   MPV  10.4  10.1     Recent Labs      04/15/18   2357  04/16/18   2356   SODIUM  137  138   POTASSIUM  3.7  3.6   CHLORIDE  106  106   CO2  23  24   GLUCOSE  101*  97   BUN  8  7*   CREATININE  0.54  0.59   CALCIUM  9.0  8.7                      Assessment/Plan     * Intractable nausea and vomiting   Assessment & Plan    No emesis but does not have appetite  The patient reports chronic nausea and vomiting since last fall when she was diagnosed with a liver mass.   It has worsened in the last several days and she has been unable to control her symptoms at home.   IV anti-emetics to control her nausea.  Regular diet        Depression   Assessment & Plan    Resume wellbutrin        Abdominal pain   Assessment & Plan    As needed pain medication          Adenocarcinoma (CMS-HCC)   Assessment & Plan    Patient underwent biopsy showing poorly differentiated adenocarcinoma. Path report reviewed with Dr Campbell from their office records  Upper and lower endoscopy completed 4/15 - no evidence of mass nor malignancy  MRI abdomen/pelvis shows multiple necrotic masses in liver   and CEA elevated, CA 19-9 normal  AFP 4119, CA-27-29 pending  Onc Zen will discuss with onc Ash to see if patient needs another biopsy          HTN (hypertension)   Assessment & Plan    She is not on any medications at home for blood pressure  PRN IV antihypertensives and we will assess for continued need.        Neuropathy (CMS-HCC)- (present on admission)   Assessment & Plan    Resume gabapentin          Tobacco abuse   Assessment & Plan    cessation counseling, nicotine replacement ordered          Quality-Core Measures    Reviewed items::  Labs reviewed and Medications reviewed  Duarte catheter::  No Duarte  DVT prophylaxis - mechanical:  SCDs

## 2018-04-17 NOTE — CARE PLAN
Problem: Safety  Goal: Will remain free from injury  Outcome: PROGRESSING AS EXPECTED  Pt is upself. Educated on safe ambulation. Uses call light for assistance.     Problem: Psychosocial Needs:  Goal: Level of anxiety will decrease    Intervention: Identify and develop with patient strategies to cope with anxiety triggers  Pt is tearful and upset. Friends are now at bedside to help distract her.

## 2018-04-18 NOTE — DISCHARGE SUMMARY
Hospital Medicine Discharge Note     Admit Date:  4/13/2018       Discharge Date:   4/18/2018    Attending Physician:  Andrés Ratliff     Diagnoses (includes active and resolved):   Principal Problem:    Intractable nausea and vomiting POA: Unknown  Active Problems:    Tobacco abuse POA: Unknown    Neuropathy (CMS-HCC) POA: Yes    HTN (hypertension) POA: Unknown    Adenocarcinoma (CMS-HCC) POA: Unknown    Abdominal pain POA: Unknown    Depression POA: Unknown  Resolved Problems:    * No resolved hospital problems. *      Hospital Summary (Brief Narrative):       63 y.o. female who presented on 4/13/2018 to outside hospital with complaints of worsening nausea, vomiting, and abdominal pain. The patient has a history of liver mass which was diagnosed in late 2017 she had apparently been lost to follow-up and only recently reestablished care. On April 4, the patient underwent a liver biopsy which came back positive for poorly differentiated adenocarcinoma. Patient has been followed by Dr. Echeverria of gastroenterology and plans have been made for outpatient colonoscopy and an EGD next week. However she then had worsening of her chronic nausea, vomiting, and right upper quadrant abdominal pain.  Patient was transferred to [facility] and admitted for workup. GI was consulted and took her for EGD and colonoscopy. These were largely unremarkable so patient then had MRI of the abdomen as well as liver biopsy for multiple necrotic masses in the liver consistent with metastases that were seen on MRI. The path for this is pending but patient may return home for outpatient oncology follow-up. She was prescribed narcotics in the meantime to help with pain.   Narcotic history was researched for this patient. The last fill was in 3/2018.  The patient met 2-midnight criteria for an inpatient stay at the time of discharge.     Consultants:      Oncologist Dr. Aleman  [facility] Consultants Dr. Guillen    Procedures:        liver biopsy  CT  [place]:  IMPRESSION:  1.  Mild sigmoid diverticulosis.  2.  Otherwise, normal colonoscopy.     PLAN:  1.  Obtain MRI of abdomen with and without contrast.  2.  Oncology consultation.  3.  Advance diet.    EGD:  IMPRESSION:  Normal upper endoscopy.    Discharge Medications:           Medication List      START taking these medications      Instructions   oxyCODONE immediate release 10 MG immediate release tablet  Commonly known as:  ROXICODONE   Take 1 Tab by mouth every 6 hours as needed for up to 8 days.  Dose:  10 mg        CONTINUE taking these medications      Instructions   buPROPion  MG Tb12 sustained-release tablet  Commonly known as:  WELLBUTRIN-SR   Take 150 mg by mouth 2 times a day.  Dose:  150 mg     gabapentin 300 MG Caps  Commonly known as:  NEURONTIN   Take 300 mg by mouth 3 times a day.  Dose:  300 mg     ondansetron 4 MG Tbdp  Commonly known as:  ZOFRAN ODT   Take 1 Tab by mouth every 6 hours as needed for Nausea.  Dose:  4 mg     sucralfate 1 GM Tabs  Commonly known as:  CARAFATE   Take 1 g by mouth 4 Times a Day,Before Meals and at Bedtime.  Dose:  1 g        STOP taking these medications    HYDROcodone-acetaminophen 5-325 MG Tabs per tablet  Commonly known as:  NORCO          Disposition:   Discharge home    Diet:   Advance slowly    Activity:   As tolerated    Code status:   Full code    Primary Care Provider:    Tg Goodwin M.D.    Follow up appointment details :      Oncology outpatient    Pending Studies:        Liver biopsy path    Time spent on discharge day patient visit: 4 minutes    #################################################    Interval history/exam for day of discharge:    Vitals:    04/18/18 1532 04/18/18 1537 04/18/18 1600 04/18/18 1630   BP: 131/73 143/80 150/80 123/71   Pulse: 79 78 91 92   Resp: 15 16 18 18   Temp:   37.6 °C (99.7 °F) 37.8 °C (100 °F)   SpO2: 98% 99% 92% 93%   Weight:       Height:         Weight/BMI: Body mass index is 21.35 kg/m².  Pulse  Oximetry: 93 %, O2 (LPM): 2, O2 Delivery: None (Room Air)    General: NAD  CVS:  RRR  PULM:  CTAB, no respiratory distress  AbD: Right upper quadrant tenderness to palpation    Most Recent Labs:    Lab Results   Component Value Date/Time    WBC 10.2 04/18/2018 12:13 AM    RBC 3.63 (L) 04/18/2018 12:13 AM    HEMOGLOBIN 12.4 04/18/2018 12:13 AM    HEMATOCRIT 36.7 (L) 04/18/2018 12:13 AM    .1 (H) 04/18/2018 12:13 AM    MCH 34.2 (H) 04/18/2018 12:13 AM    MCHC 33.8 04/18/2018 12:13 AM    MPV 10.3 04/18/2018 12:13 AM    NEUTSPOLYS 76.00 (H) 04/16/2018 11:56 PM    LYMPHOCYTES 13.60 (L) 04/16/2018 11:56 PM    MONOCYTES 8.80 04/16/2018 11:56 PM    EOSINOPHILS 0.80 04/16/2018 11:56 PM    BASOPHILS 0.40 04/16/2018 11:56 PM      Lab Results   Component Value Date/Time    SODIUM 140 04/18/2018 12:13 AM    POTASSIUM 3.7 04/18/2018 12:13 AM    CHLORIDE 108 04/18/2018 12:13 AM    CO2 24 04/18/2018 12:13 AM    GLUCOSE 98 04/18/2018 12:13 AM    BUN 8 04/18/2018 12:13 AM    CREATININE 0.51 04/18/2018 12:13 AM      Lab Results   Component Value Date/Time    ALTSGPT 57 (H) 04/18/2018 12:13 AM    ASTSGOT 102 (H) 04/18/2018 12:13 AM    ALKPHOSPHAT 198 (H) 04/18/2018 12:13 AM    TBILIRUBIN 0.4 04/18/2018 12:13 AM    ALBUMIN 3.2 04/18/2018 12:13 AM    GLOBULIN 2.5 04/18/2018 12:13 AM    INR 1.16 (H) 04/18/2018 09:27 AM     Lab Results   Component Value Date/Time    PROTHROMBTM 14.5 04/18/2018 09:27 AM    INR 1.16 (H) 04/18/2018 09:27 AM        Imaging/ Testing:      MR-ABDOMEN-WITH & W/O   Final Result      1.  Numerous large irregular centrally necrotic liver masses consistent with metastases.   2.  Multiple cysts in both kidneys, some of which are hemorrhagic.   3.  LEFT adrenal adenoma.            OUTSIDE IMAGES-CT ABDOMEN /PELVIS   Final Result      CT-FOREIGN FILM CAT SCAN   Final Result      MR-FOREIGN FILM MRI   Final Result      OUTSIDE IMAGES-CT ABDOMEN /PELVIS   Final Result      CT-NEEDLE BX-LIVER    (Results Pending)        Instructions:      The patient was instructed to return to the ER in the event of worsening symptoms. I have counseled the patient on the importance of compliance and the patient has agreed to proceed with all medical recommendations and follow up plan indicated above.   The patient understands that all medications come with benefits and risks. Risks may include permanent injury or death and these risks can be minimized with close reassessment and monitoring.

## 2018-04-18 NOTE — PROGRESS NOTES
IR Procedure Note:    Site Marked and Confirmed with MD, patient and RN pre procedure.     Dr. Iglesias performed a CT guided liver biopsy with 4 cores taken. The pt tolerated the procedure well; ETCo2 baseline 33, with consistent waveform during the procedure.  Tegaderm and gauze applied to right upper abdomen, CDI and soft.  Pt alert, oriented and verbally appropriate post procedure, vital signs stable during procedure and transport, see flow sheet for vital signs.  Report given to BERHANE Rolle.  RN transported pt to [facility] with Sao2 monitor = 95% on room air.

## 2018-04-18 NOTE — PROGRESS NOTES
Assumed care from AM shift. AAOx4. Up self. PIV flushes.  Denies pain and nausea. No further needs at this time.  Will continue to monitor.

## 2018-04-18 NOTE — PROGRESS NOTES
Patient down to liver biopsy, consent signed and in the chart, PIV DAVID, pt has been NPO since 0000.

## 2018-04-18 NOTE — OR SURGEON
Immediate Post- Operative Note        PostOp Diagnosis: liver masses      Procedure(s): liver biopsy CT      Estimated Blood Loss: Less than 5 ml        Complications: None            4/18/2018     3:26 PM     Phillip Iglesias

## 2018-04-18 NOTE — PROGRESS NOTES
Oncology/Hematology Progress Note               Author: Ricardo Zaldivar Date & Time created: 4/18/2018  11:44 AM     Interval History:  Upper and lower endoscopies were negative for a primary lesion.  Pathology from liver biopsy-poorly differentiated adenocarcinoma favoring a GI origin  MRI abdomen-numerous liver metastases with central necrosis. No evidence of any primary  -Alpha-fetoprotein-4119!  -Biopsy of liver metastasis and 2 PM    Review of Systems:  Review of Systems   Constitutional: Negative.    HENT: Negative.    Eyes: Negative.    Respiratory: Negative.    Cardiovascular: Negative.    Gastrointestinal: Negative.    Genitourinary: Negative.    Musculoskeletal: Negative.    Skin: Negative.    Neurological: Negative.        Physical Exam:  Physical Exam   Constitutional: She is oriented to person, place, and time. She appears well-developed and well-nourished.   HENT:   Head: Normocephalic.   Eyes: EOM are normal. Pupils are equal, round, and reactive to light.   Neck: Neck supple.   Cardiovascular: Normal rate and regular rhythm.    Pulmonary/Chest: Effort normal and breath sounds normal.   Abdominal: Soft. Bowel sounds are normal.   Musculoskeletal: She exhibits no edema.   Lymphadenopathy:     She has no cervical adenopathy.   Neurological: She is alert and oriented to person, place, and time.       Labs:        Invalid input(s): [id number]      Recent Labs      04/15/18   2357  04/16/18   2356 04/18/18   0013   SODIUM  137  138  140   POTASSIUM  3.7  3.6  3.7   CHLORIDE  106  106  108   CO2  23  24  24   BUN  8  7*  8   CREATININE  0.54  0.59  0.51   CALCIUM  9.0  8.7  8.9     Recent Labs      04/15/18   2357  04/16/18   2356  04/18/18   0013   ALTSGPT  20  18  57*   ASTSGOT  54*  53*  102*   ALKPHOSPHAT  132*  121*  198*   TBILIRUBIN  0.5  0.4  0.4   GLUCOSE  101*  97  98     Recent Labs      04/15/18   2357  04/16/18   2356 04/18/18   0013  04/18/18   0927   RBC  3.83*  3.72*  3.63*   --     HEMOGLOBIN  12.9  12.5  12.4   --    HEMATOCRIT  38.8  37.6  36.7*   --    PLATELETCT  225  220  216   --    PROTHROMBTM   --    --    --   14.5   INR   --    --    --   1.16*     Recent Labs      04/15/18   2357  04/16/18   2356  04/18/18   0013   WBC  11.0*  10.3  10.2   NEUTSPOLYS  77.90*  76.00*   --    LYMPHOCYTES  12.70*  13.60*   --    MONOCYTES  7.60  8.80   --    EOSINOPHILS  1.10  0.80   --    BASOPHILS  0.30  0.40   --    ASTSGOT  54*  53*  102*   ALTSGPT  20  18  57*   ALKPHOSPHAT  132*  121*  198*   TBILIRUBIN  0.5  0.4  0.4     Recent Labs      04/15/18   2357  04/16/18   2356  04/18/18   0013   SODIUM  137  138  140   POTASSIUM  3.7  3.6  3.7   CHLORIDE  106  106  108   CO2  23  24  24   GLUCOSE  101*  97  98   BUN  8  7*  8   CREATININE  0.54  0.59  0.51   CALCIUM  9.0  8.7  8.9     Hemodynamics:  Temp (24hrs), Av.9 °C (98.4 °F), Min:36.6 °C (97.9 °F), Max:37.2 °C (98.9 °F)  Temperature: 36.7 °C (98.1 °F)  Pulse  Av.4  Min: 70  Max: 95   Blood Pressure: 124/67     Respiratory:    Respiration: 18, Pulse Oximetry: 95 %        RUL Breath Sounds: Clear, RML Breath Sounds: Clear, RLL Breath Sounds: Clear, JACQUELYN Breath Sounds: Clear, LLL Breath Sounds: Clear  Fluids:    Intake/Output Summary (Last 24 hours) at 18 1411  Last data filed at 04/15/18 1649   Gross per 24 hour   Intake             1881 ml   Output                0 ml   Net             1881 ml        GI/Nutrition:  Orders Placed This Encounter   Procedures   • DIET NPO     Standing Status:   Standing     Number of Occurrences:   8     Order Specific Question:   Restrict to:     Answer:   Sips with Medications [3]     Medical Decision Making, by Problem:  Active Hospital Problems    Diagnosis   • *Intractable nausea and vomiting [R11.2]   • Tobacco abuse [Z72.0]   • HTN (hypertension) [I10]   • Adenocarcinoma (CMS-HCC) [C80.1]   • Abdominal pain [R10.9]       Plan:  1. Metastatic poorly differentiated adenocarcinoma which  immunohistochemically appears to be from a gastrointestinal origin. I did discuss the pathology with the pathologist, Dr. Escoto. Unfortunately, most of the biopsy was necrotic tissue with very little tissue for further diagnostic studies. She has an elevated CA-125 of 179 and a negative CEA and CA 19-9. She has had a hysterectomy when she was 23 which sounds as though she had a total hysterectomy since she had hot flashes ever since. There is no family history of GI malignancies or breast or ovarian cancer. She will be having an MRI of her abdomen procedure can locate a possible primary or other lesion to biopsy. She has multiple lesions in her liver many of which do look necrotic. We might require further tissue for more pathologic evaluation with immunohistochemical stains and possibly Foundation one analysis for treatment options  MRI of the liver shows only the numerous multiple hepatic metastases with a large ones having significant necrosis. There is no evidence of a primary cancer. Remarkably, her alpha-fetoprotein is elevated at 4119. I spoke with the radiologist, Dr. Booker, he said that the picture is not typical for hepatocellular carcinoma but it is certainly a possibility. Necrosis is somewhat unusual. I will discuss with Dr. Aleman whether we want to have a another biopsy performed with more tissue for further IHC studies as well as to [facility].  If the patient is very stable after the biopsy of her liver metastases this afternoon, she could be discharged home to follow up with Dr. Aleman next week after the results are available    Quality-Core Measures

## 2018-04-18 NOTE — PROGRESS NOTES
Bedside report received from noc RN, assumed pt care, assessment completed, pt aox4, pt ambulates steady gait up independently, NPO at present time due to procedure spoke with IR and time is tentative for 1400, updated pt on POC and agreeable, PIV IVF per Jasson, pt medicated per MAR for pain, bed in low position, call light and personal belongings within reach, hourly rounding in place, pt needs met.

## 2018-04-19 NOTE — PROGRESS NOTES
Patient left with family via private car.  RN escorted to car, transported in wheelchair.  All belongings with patient.  No issues during transport.

## 2018-04-19 NOTE — PROGRESS NOTES
Called in response to Dr. Echeverria's referral. Pt stated that she just got home from the hospital last night, and today she has been able to get up and walk around.  She stated that she has to be careful because of her peripheral neuropathy, but she is planning to  a walker at a thrift store to make sure she does not fall.  She has also called and LM at Dr. Aleman's office to schedule a follow up visit with him, but she has not heard back yet.  Enc pt to call ONN if no return call in a few days.   Also explained to pt that bx results also take a few days.  Pt verbalized understanding.  Pt wrote down contact information.  Plan to send letter in follow up.  Pt indicated no other questions or concerns at this time. dsw

## 2018-04-19 NOTE — DISCHARGE INSTRUCTIONS
Discharge Instructions    Discharged to home by car with relative. Discharged via wheelchair, hospital escort: Yes.  Special equipment needed: Not Applicable    Be sure to schedule a follow-up appointment with your primary care doctor or any specialists as instructed.     Discharge Plan:   Diet Plan: Discussed  Activity Level: Discussed  Smoking Cessation Offered: Patient Counseled  Confirmed Follow up Appointment: Patient to Call and Schedule Appointment  Confirmed Symptoms Management: Discussed  Medication Reconciliation Updated: Yes  Influenza Vaccine Indication: Patient Refuses    I understand that a diet low in cholesterol, fat, and sodium is recommended for good health. Unless I have been given specific instructions below for another diet, I accept this instruction as my diet prescription.   Other diet: Regular diet as tolerated    Special Instructions: None    · Is patient discharged on Warfarin / Coumadin?   No     Depression / Suicide Risk    As you are discharged from this [facility] facility, it is important to learn how to keep safe from harming yourself.    Recognize the warning signs:  · Abrupt changes in personality, positive or negative- including increase in energy   · Giving away possessions  · Change in eating patterns- significant weight changes-  positive or negative  · Change in sleeping patterns- unable to sleep or sleeping all the time   · Unwillingness or inability to communicate  · Depression  · Unusual sadness, discouragement and loneliness  · Talk of wanting to die  · Neglect of personal appearance   · Rebelliousness- reckless behavior  · Withdrawal from people/activities they love  · Confusion- inability to concentrate     If you or a loved one observes any of these behaviors or has concerns about self-harm, here's what you can do:  · Talk about it- your feelings and reasons for harming yourself  · Remove any means that you might use to hurt yourself (examples: pills, rope, extension  cords, firearm)  · Get professional help from the community (Mental Health, Substance Abuse, psychological counseling)  · Do not be alone:Call your Safe Contact- someone whom you trust who will be there for you.  · Call your local CRISIS HOTLINE 451-5716 or 987-239-6062  · Call your local Children's Mobile Crisis Response Team [place] (921) 542-0531 or www.[employer]  · Call the toll free National Suicide Prevention Hotlines   · National Suicide Prevention Lifeline 100-122-EESE (1975)  · National Hope Line Network 800-SUICIDE (609-6284)

## 2018-04-20 NOTE — PROGRESS NOTES
"Pt LM and asked for return call. Called pt who stated that the doctor called her and told her the cancer is ovarian.  She stated that he may be sending her to another doctor who \"treats that\" hopefully by next week.  Pt also stated she feels relieved to have an answer.  Pt was even laughing with her son (who was heard in the background) and stated he has been very helpful to her with humor and other support.  Plan to follow up with pt prn.  dsw  "

## 2018-05-04 PROBLEM — E86.0 DEHYDRATION: Status: ACTIVE | Noted: 2018-01-01

## 2018-05-04 PROBLEM — C78.7 LIVER METASTASIS: Status: ACTIVE | Noted: 2018-01-01

## 2018-05-04 PROBLEM — M54.9 CHRONIC BACK PAIN: Status: ACTIVE | Noted: 2018-01-01

## 2018-05-04 PROBLEM — G89.29 CHRONIC BACK PAIN: Status: ACTIVE | Noted: 2018-01-01

## 2018-05-04 PROBLEM — E87.1 HYPONATREMIA: Status: ACTIVE | Noted: 2018-01-01

## 2018-05-04 PROBLEM — D72.829 LEUKOCYTOSIS: Status: ACTIVE | Noted: 2018-01-01

## 2018-05-04 NOTE — ED PROVIDER NOTES
ED Provider Note    CHIEF COMPLAINT  Chief Complaint   Patient presents with   • RUQ Pain     pt with hx metastatic cancer to the liver.    • N/V         HPI  Tonya Collins is a 63 y.o. female who presents to the ED with right upper quadrant abdominal pain. The patient has a known metastatic cancer to her liver with necrotic lesions had a biopsy done apparently 2 weeks ago that showed possible ovarian cancer, has followed up with Dr. Fernandes, who ordered a CT scan of the abdomen and pelvis however the patient states that her pain is too severe that she cannot get this test done. Patient states the pain is sharp severe constant right upper quadrant radiates to the back, with associated nausea vomiting, the pain is gotten worse over the last several days, the patient denies any fevers, does state that she has some slight dysuria, states she has been taking laxatives to try to prevent constipation, she does not feel like she is constipated    . REVIEW OF SYSTEMS  See HPI for further details. All other systems are negative.     PAST MEDICAL HISTORY  Past Medical History:   Diagnosis Date   • Arthritis     osteoarthritis   • Back pain    • Cancer (HCC)     hepatic adenocarcinoma, ovaraian/uterine ca.   • Gynecological disorder     ovarian/uterine cancer   • Indigestion    • Pneumonia    • Psychiatric problem     depression       FAMILY HISTORY  No family history on file.    SOCIAL HISTORY  Social History     Social History   • Marital status:      Spouse name: N/A   • Number of children: N/A   • Years of education: N/A     Social History Main Topics   • Smoking status: Former Smoker     Packs/day: 0.50     Years: 48.00     Start date: 1/1/1970     Quit date: 4/11/2018   • Smokeless tobacco: Never Used   • Alcohol use No   • Drug use: Yes      Comment: past marijuana use   • Sexual activity: Not on file     Other Topics Concern   • Primary/Cop[facility] No   • Family Contact Information Yes     " Shawna Collins, [phone]   • Ok To Release Patient Information To The Following Yes     Shawna Collins   • Patient Preferred Routine/Privacy Concerns No   • Patient Likes And Dislikes No   • Participating In Research Study No   • Miscellaneous No     Social History Narrative   • No narrative on file       SURGICAL HISTORY  Past Surgical History:   Procedure Laterality Date   • GASTROSCOPY  4/15/2018    Procedure: GASTROSCOPY;  Surgeon: Fredi Guillen M.D.;  Location: SURGERY [facility];  Service: Gastroenterology   • COLONOSCOPY  4/15/2018    Procedure: COLONOSCOPY;  Surgeon: Fredi Guillen M.D.;  Location: SURGERY [facility];  Service: Gastroenterology   • APPENDECTOMY  1984   • GYN SURGERY  1980    total hysterectomy, L ovary tumor removal.   • EYE SURGERY Left 1968    L eye trauma with unknown eye procedure performed.   • OTHER         CURRENT MEDICATIONS  Home Medications     Reviewed by Gloria Conner (Pharmacy Tech) on 05/04/18 at 1131  Med List Status: Complete   Medication Last Dose Status   buPROPion SR (WELLBUTRIN-SR) 150 MG TABLET SR 12 HR sustained-release tablet 5/3/2018 Active   gabapentin (NEURONTIN) 300 MG Cap 5/3/2018 Active   ondansetron (ZOFRAN ODT) 4 MG TABLET DISPERSIBLE <2 weeks Active   oxyCODONE immediate release (ROXICODONE) 10 MG immediate release tablet 5/4/2018 Active                ALLERGIES  Allergies   Allergen Reactions   • Iodine Anaphylaxis   • Shellfish Allergy Anaphylaxis       PHYSICAL EXAM  VITAL SIGNS: /77   Pulse 85   Temp 37.6 °C (99.7 °F)   Resp 18   Ht 1.727 m (5' 8\")   Wt 61.5 kg (135 lb 9.3 oz)   SpO2 94%   BMI 20.62 kg/m²   Constitutional: Well developed, Well nourished, moderate distress, Non-toxic appearance.   HENT: Normocephalic, Atraumatic, Bilateral external ears normal, Oropharynx clear with dry mucous membranes, No oral exudates, Nose normal.   Eyes: PERRLA, EOMI, Conjunctiva normal, No discharge.   Neck: Normal " range of motion, No tenderness, Supple, No stridor.   Lymphatic: No lymphadenopathy noted.   Cardiovascular: Normal heart rate, Normal rhythm.   Thorax & Lungs: Normal breath sounds, No respiratory distress, No wheezing, No chest tenderness.   Abdomen: Exquisitely tender especially in the right upper quadrant, the patient cannot hold still, no rebound  Skin: Warm, Dry, No erythema, No rash.   Back: No tenderness, No CVA tenderness.   Extremities: Intact distal pulses, No edema, No tenderness.   Neurologic: Alert & oriented x 3, moves all extremities spontaneously  Psych: Anxious hyperventilating.     RADIOLOGY/PROCEDURES  CT-ABDOMEN-PELVIS W/O   Final Result      1.  Too numerous to count hepatic metastases are without significant change and measure up to 17 cm      2.  Minimal pelvic ascites, post hysterectomy and oophorectomies without lymphadenopathy seen      3.  Collateral retroperitoneal vein formation secondary to mass effect upon the inferior vena cava      4.  Left adrenal adenoma      5.  Bilateral renal cysts are likely Bosniak 1 and 2 although they are not fully characterized          Results for orders placed or performed during the hospital encounter of 05/04/18   CBC WITH DIFFERENTIAL   Result Value Ref Range    WBC 11.6 (H) 4.8 - 10.8 K/uL    RBC 4.19 (L) 4.20 - 5.40 M/uL    Hemoglobin 14.0 12.0 - 16.0 g/dL    Hematocrit 42.0 37.0 - 47.0 %    .2 (H) 81.4 - 97.8 fL    MCH 33.4 (H) 27.0 - 33.0 pg    MCHC 33.3 (L) 33.6 - 35.0 g/dL    RDW 47.0 35.9 - 50.0 fL    Platelet Count 263 164 - 446 K/uL    MPV 9.7 9.0 - 12.9 fL    Neutrophils-Polys 81.80 (H) 44.00 - 72.00 %    Lymphocytes 9.50 (L) 22.00 - 41.00 %    Monocytes 7.70 0.00 - 13.40 %    Eosinophils 0.30 0.00 - 6.90 %    Basophils 0.30 0.00 - 1.80 %    Immature Granulocytes 0.40 0.00 - 0.90 %    Nucleated RBC 0.00 /100 WBC    Neutrophils (Absolute) 9.52 (H) 2.00 - 7.15 K/uL    Lymphs (Absolute) 1.10 1.00 - 4.80 K/uL    Monos (Absolute) 0.90 (H)  0.00 - 0.85 K/uL    Eos (Absolute) 0.03 0.00 - 0.51 K/uL    Baso (Absolute) 0.04 0.00 - 0.12 K/uL    Immature Granulocytes (abs) 0.05 0.00 - 0.11 K/uL    NRBC (Absolute) 0.00 K/uL   LIPASE   Result Value Ref Range    Lipase 11 7 - 58 U/L   COMP METABOLIC PANEL   Result Value Ref Range    Sodium 131 (L) 135 - 145 mmol/L    Potassium 4.0 3.6 - 5.5 mmol/L    Chloride 99 96 - 112 mmol/L    Co2 23 20 - 33 mmol/L    Anion Gap 9.0 0.0 - 11.9    Glucose 122 (H) 65 - 99 mg/dL    Bun 14 8 - 22 mg/dL    Creatinine 0.75 0.50 - 1.40 mg/dL    Calcium 10.3 (H) 8.4 - 10.2 mg/dL    AST(SGOT) 92 (H) 12 - 45 U/L    ALT(SGPT) 37 2 - 50 U/L    Alkaline Phosphatase 226 (H) 30 - 99 U/L    Total Bilirubin 0.9 0.1 - 1.5 mg/dL    Albumin 3.6 3.2 - 4.9 g/dL    Total Protein 6.9 6.0 - 8.2 g/dL    Globulin 3.3 1.9 - 3.5 g/dL    A-G Ratio 1.1 g/dL   LACTIC ACID   Result Value Ref Range    Lactic Acid 1.48 0.50 - 2.00 mmol/L   APTT   Result Value Ref Range    APTT 23.7 (L) 24.7 - 36.0 sec   PROTHROMBIN TIME (INR)   Result Value Ref Range    PT 14.2 12.0 - 14.6 sec    INR 1.11 0.87 - 1.13   ESTIMATED GFR   Result Value Ref Range    GFR If African American >60 >60 mL/min/1.73 m 2    GFR If Non African American >60 >60 mL/min/1.73 m 2        COURSE & MEDICAL DECISION MAKING  Pertinent Labs & Imaging studies reviewed. (See chart for details)  Patient with moderate to severe abdominal pain with associated nausea vomiting she has a history of metastatic lesions in her liver. Discussed the case with Dr. Epstein the radiologist, he feels the patient requires a CT scan with by mouth and IV contrast to further delineate the patient's cancer and to see if there is any obstructions, perforations or bleeding however the patient has an iodine allergy therefore the patient will not get IV contrast but will get a barium-based oral contrast. We will treat the patient with IV pain medicines, give the patient IV fluids due to her dry mucous permits and nausea  vomiting which improved the patient's symptoms.    Patient is feeling improved after IV fluids, pain medicines however CT scan shows diffuse metastatic lesions in the liver, uncertain etiology, discussed the case with Dr. Fernandes's physician assistant, I believe the patient should be transferred to the main hospital for further evaluation and treatment of her cancer. Dr. Ibarra will consult, discussed the case with the hospitalist for admission to hospital.    FINAL IMPRESSION  1. Liver metastases (HCC)    2. Intractable vomiting with nausea, unspecified vomiting type    3. Right upper quadrant abdominal pain            This dictation was created using voice recognition software. The accuracy of the dictation is limited to the abilities of the software. I expect there may be some errors of grammar and possibly content. The nursing notes were reviewed and certain aspects of this information were incorporated into this note.    Electronically signed by: Andrey Kaur, 5/4/2018 11:07 AM

## 2018-05-04 NOTE — PROGRESS NOTES
Direct admit from [facility], Dr. Mcclendon for Liver metastasis.  Discharge and readmit orders signed and held, need to be released upon pt arrival.  Pt coming by ground.

## 2018-05-04 NOTE — ED NOTES
"Chief Complaint   Patient presents with   • RUQ Pain     pt with hx metastatic cancer to the liver.    • N/V     /77   Pulse 95   Temp 37.6 °C (99.7 °F)   Resp 18   Ht 1.727 m (5' 8\")   Wt 61.5 kg (135 lb 9.3 oz)   SpO2 95%   BMI 20.62 kg/m²     "

## 2018-05-04 NOTE — ED NOTES
Patient vomiting while drinking solution for CT. ERP informed, orders received, Medicinal interventions carried out per ERP orders.

## 2018-05-04 NOTE — ED NOTES
The Medication Reconciliation process has been completed by interviewing the patient who did have her oxycodone bottle with her which was reviewed and returned.    Allergies have been reviewed  Antibiotic use in 30 days - none    Home Pharmacy:  [facility]

## 2018-05-05 PROBLEM — C80.1 ADENOCARCINOMA (HCC): Status: RESOLVED | Noted: 2018-01-01 | Resolved: 2018-01-01

## 2018-05-05 NOTE — PROGRESS NOTES
Assumed care of pt @0700. Bedside report received. Pt AOX 4. Pt rates pain 9/10. Morphine wasn't helping. MD notified. Dilaudid 1 mg ordered. Dilaudid gave adequate relief. Pt complains about nausea. Zofran, Compazine given. PIV running IV fluids. Pt states she had BM few days ago. Pt passes gas. Stool softeners given. Pt refuse at this time Miralax or MOM. Pt up with standby assist. Fall precaution in place. POC discussed with pt, all questions answered at this time. Pt makes needs known, call light within reach, hourly rounding in place.

## 2018-05-05 NOTE — CARE PLAN
Problem: Venous Thromboembolism (VTW)/Deep Vein Thrombosis (DVT) Prevention:  Goal: Patient will participate in Venous Thrombosis (VTE)/Deep Vein Thrombosis (DVT)Prevention Measures  Outcome: PROGRESSING AS EXPECTED  Pt refuses SCD's despite education. Pt ambulates around the room with standby assist.     Problem: Bowel/Gastric:  Goal: Normal bowel function is maintained or improved  Outcome: PROGRESSING AS EXPECTED  Pt complains about nausea. Zofran and Compazine given.     Problem: Pain Management  Goal: Pain level will decrease to patient's comfort goal  Outcome: PROGRESSING AS EXPECTED  Pt complains about pain. Morphine not helping. Md notified. Dilaudid ordered.

## 2018-05-05 NOTE — PROGRESS NOTES
Patient received via ground transport as a direct from [facility] at change of shift. New CSN encounter and new armband applied. A/Ox4 up with standby assistance. Patient reports 8/10 RLQ pain with n/v. Medicated per MAR. PIV 20G right forearm w/ NS @ 100ml/hr. POC discussed with patient and all questions answered at this time. Safety precautions in place, bed locked in low, call light within reach, non-skid socks on. Will round every hour.

## 2018-05-05 NOTE — PROGRESS NOTES
Med rec updated and complete.  Allergies reviewed per med rec   At [facility] prior to transfer.

## 2018-05-05 NOTE — ASSESSMENT & PLAN NOTE
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 However, unfortunately the tissue sample was necrotic and there was no enough samples to run further testing. Oncology not convinced its gynecologic in origin especially with a history of total hysterectomy in the past. Dr. Fernandes wanted to transfer the patient for pain management and to discuss further management at the oncology unit at [facility].

## 2018-05-05 NOTE — PROGRESS NOTES
2 RN skin check completed.     Patient has small scabs on left lower back and bilateral buttock area. Sacrum blanching.

## 2018-05-05 NOTE — H&P
Hospital Medicine History and Physical    Date of Service  5/4/2018    Chief Complaint  Chief Complaint   Patient presents with   • RUQ Pain     pt with hx metastatic cancer to the liver.    • N/V       History of Presenting Illness  63 y.o. female who presents to the ED with right upper quadrant abdominal pain. The patient has a known metastatic cancer to her liver with necrotic lesions had a biopsy done apparently 2 weeks ago that showed possible ovarian cancer, has followed up with Dr. Fernandes, who ordered a CT scan of the abdomen and pelvis however the patient states that her pain is too severe that she cannot get this test done. Patient states the pain is sharp severe constant right upper quadrant radiates to the back, with associated nausea vomiting, the pain is gotten worse over the last several days, the patient denies any fevers, does state that she has some slight dysuria, states she has been taking laxatives to try to prevent constipation. Denies any fever or chills. Denies any dysuria or hematuria. Denies any cough or sore throat.   Primary Care Physician  Tg Goodwin M.D.    Consultants  [facility] oncology Dr. Fernandes    Code Status  Full    Review of Systems  Review of Systems   Constitutional: Positive for malaise/fatigue and weight loss. Negative for chills and fever.   HENT: Negative for hearing loss.    Eyes: Negative for blurred vision and double vision.   Respiratory: Negative for cough and hemoptysis.    Cardiovascular: Negative for chest pain.   Gastrointestinal: Positive for abdominal pain, constipation, nausea and vomiting. Negative for heartburn.   Genitourinary: Negative for dysuria and urgency.   Musculoskeletal: Negative for myalgias and neck pain.   Skin: Negative for rash.   Neurological: Negative for dizziness and headaches.   Endo/Heme/Allergies: Negative for environmental allergies. Does not bruise/bleed easily.   Psychiatric/Behavioral: Negative for depression and suicidal ideas.         Past Medical History  Past Medical History:   Diagnosis Date   • Arthritis     osteoarthritis   • Back pain    • Cancer (HCC)     hepatic adenocarcinoma, ovaraian/uterine ca.   • Gynecological disorder     ovarian/uterine cancer   • Indigestion    • Pneumonia    • Psychiatric problem     depression       Surgical History  Past Surgical History:   Procedure Laterality Date   • GASTROSCOPY  4/15/2018    Procedure: GASTROSCOPY;  Surgeon: Fredi Guillen M.D.;  Location: SURGERY [facility];  Service: Gastroenterology   • COLONOSCOPY  4/15/2018    Procedure: COLONOSCOPY;  Surgeon: Fredi Guillen M.D.;  Location: SURGERY [facility];  Service: Gastroenterology   • APPENDECTOMY     • GYN SURGERY      total hysterectomy, L ovary tumor removal.   • EYE SURGERY Left     L eye trauma with unknown eye procedure performed.   • OTHER         Medications  No current facility-administered medications on file prior to encounter.      Current Outpatient Prescriptions on File Prior to Encounter   Medication Sig Dispense Refill   • ondansetron (ZOFRAN ODT) 4 MG TABLET DISPERSIBLE Take 1 Tab by mouth every 6 hours as needed for Nausea. 30 Tab 1   • buPROPion SR (WELLBUTRIN-SR) 150 MG TABLET SR 12 HR sustained-release tablet Take 150 mg by mouth 2 times a day.     • gabapentin (NEURONTIN) 300 MG Cap Take 300 mg by mouth 3 times a day.         Family History  Reviewed. Noncontributory.    Social History  Social History   Substance Use Topics   • Smoking status: Former Smoker     Packs/day: 0.50     Years: 48.00     Start date: 1970     Quit date: 2018   • Smokeless tobacco: Never Used   • Alcohol use No       Allergies  Allergies   Allergen Reactions   • Iodine Anaphylaxis   • Shellfish Allergy Anaphylaxis        Physical Exam  Laboratory   Hemodynamics  Temp (24hrs), Av.6 °C (99.7 °F), Min:37.6 °C (99.7 °F), Max:37.6 °C (99.7 °F)   Temperature: 37.6 °C (99.7 °F)  Pulse  Av.8  Min: 80   Max: 101    Blood Pressure: 127/77, NIBP: 111/74      Respiratory      Respiration: 18, Pulse Oximetry: 97 %             Physical Exam   Constitutional: She is oriented to person, place, and time. She appears cachectic. She has a sickly appearance. No distress.   HENT:   Head: Normocephalic and atraumatic.   Eyes: Pupils are equal, round, and reactive to light. Left eye exhibits no discharge.   Neck: Normal range of motion. No thyromegaly present.   Cardiovascular: Normal rate and regular rhythm.  Exam reveals no friction rub.    Pulmonary/Chest: Effort normal. No respiratory distress.   Abdominal: Soft. There is tenderness in the right upper quadrant. There is guarding.   Musculoskeletal: Normal range of motion. She exhibits no edema or deformity.   Neurological: She is alert and oriented to person, place, and time.   Skin: Skin is warm.   Psychiatric: Her behavior is normal.       Recent Labs      05/04/18   1123   WBC  11.6*   RBC  4.19*   HEMOGLOBIN  14.0   HEMATOCRIT  42.0   MCV  100.2*   MCH  33.4*   MCHC  33.3*   RDW  47.0   PLATELETCT  263   MPV  9.7     Recent Labs      05/04/18   1123   SODIUM  131*   POTASSIUM  4.0   CHLORIDE  99   CO2  23   GLUCOSE  122*   BUN  14   CREATININE  0.75   CALCIUM  10.3*     Recent Labs      05/04/18   1123   ALTSGPT  37   ASTSGOT  92*   ALKPHOSPHAT  226*   TBILIRUBIN  0.9   LIPASE  11   GLUCOSE  122*     Recent Labs      05/04/18   1123   APTT  23.7*   INR  1.11             No results found for: TROPONINI  Urinalysis:  No results found for: SPECGRAVITY, GLUCOSEUR, KETONES, NITRITE, WBCURINE, RBCURINE, BACTERIA, EPITHELCELL     Imaging  Reviewed    Assessment/Plan     I anticipate this patient will require at least 2 midnight stay for appropriate medical management.    * Intractable abdominal pain- (present on admission)   Assessment & Plan    Secondary to above. Pain management. Also has a history of obstipation and was placed on appropriate bowel regimen.        Liver  metastasis (HCC)- (present on admission)   Assessment & Plan    CT-guided biopsy done 2 weeks ago with pathology showing metastatic poorly differentiated carcinoma, histologically and immunohistochemically most consistent with ovarian serous carcinoma. However, unfortunately the tissue sample was necrotic and there was no enough samples to run further testing. Oncology not convinced its gynecologic in origin especially with a history of total hysterectomy in the past. Dr. Fernandes wanted to transfer the patient for pain management and to discuss further management at the oncology unit at [facility].        Intractable nausea and vomiting- (present on admission)   Assessment & Plan    Continue with IV hydration. When necessary medications for nausea.        Hyponatremia- (present on admission)   Assessment & Plan    Likely due to hydration. Continue to monitor.        Dehydration- (present on admission)   Assessment & Plan    Continue with IV hydration.        Chronic back pain- (present on admission)   Assessment & Plan    Continue home medications.        Leukocytosis- (present on admission)   Assessment & Plan    Could be stress-induced. No signs of active infection clinically. Continue to monitor.        Depression- (present on admission)   Assessment & Plan    Continue home medications.        HTN (hypertension)- (present on admission)   Assessment & Plan    Monitor blood pressure.        Neuropathy (HCC)- (present on admission)   Assessment & Plan    Patient has peripheral neuropathy of unknown origin. Continue home medications.            VTE prophylaxis: Heparin

## 2018-05-05 NOTE — ASSESSMENT & PLAN NOTE
Secondary to above. Pain management. Also has a history of obstipation and was placed on appropriate bowel regimen.

## 2018-05-05 NOTE — CARE PLAN
Problem: Safety  Goal: Will remain free from injury  Outcome: PROGRESSING AS EXPECTED  Assessed patient mobility as stand by assist and tolerates well. Ensured room is clean and clear of clutter to prevent unnecessary injury.

## 2018-05-05 NOTE — ASSESSMENT & PLAN NOTE
Due to liver mets; pain better controlled   MS Contin to 30mg bid and PRN MSIR as needed  Feeling distended and bloated today with poor po intake, KUB ordered some stool present, states pain is better following 2 bowel movements yesterday, given her persistent high WBC, ordered CT abdomen to make sure no infectious process in abdomen present - none found.  Liver appears same.  Left sided pneumonia again mentioned on imaging

## 2018-05-05 NOTE — PROGRESS NOTES
[facility]ist Progress Note    Date of Service: 2018    Chief Complaint  63 y.o. female admitted 2018 with progressive, severe RUQ abdom pain with n/v over last several weeks    Interval Problem Update  Still has severe pain, up to 10/10 in RUQ not well relieved with Morphine IV here; n/v better and ate breakfast ok.    Consultants/Specialty  Gyne Onc    Disposition  TBD        Review of Systems   Constitutional: Negative for fever.   HENT: Negative for hearing loss.    Eyes: Negative for blurred vision.   Respiratory: Negative for cough.    Cardiovascular: Negative for chest pain.   Gastrointestinal: Positive for abdominal pain and nausea.   Genitourinary: Negative for dysuria.   Musculoskeletal: Negative for myalgias.   Skin: Negative for rash.   Neurological: Negative for dizziness.      Physical Exam  Laboratory/Imaging   Hemodynamics  Temp (24hrs), Av.6 °C (97.9 °F), Min:36.4 °C (97.5 °F), Max:36.9 °C (98.5 °F)   Temperature: 36.4 °C (97.6 °F)  Pulse  Av.7  Min: 79  Max: 93    Blood Pressure: 104/57      Respiratory      Respiration: 16, Pulse Oximetry: 96 %        RUL Breath Sounds: Clear, RML Breath Sounds: Clear, RLL Breath Sounds: Clear, JACQUELYN Breath Sounds: Clear, LLL Breath Sounds: Clear    Fluids  No intake or output data in the 24 hours ending 18 1055    Nutrition  Orders Placed This Encounter   Procedures   • Diet Order     Standing Status:   Standing     Number of Occurrences:   1     Order Specific Question:   Diet:     Answer:   Hepatic [9]     Physical Exam   Constitutional: She is oriented to person, place, and time. She appears distressed.   Distressed due to abd pain   HENT:   Head: Normocephalic and atraumatic.   Eyes: EOM are normal.   Neck: Neck supple.   Cardiovascular: Normal rate and regular rhythm.    Pulmonary/Chest: Effort normal and breath sounds normal. No respiratory distress.   Abdominal: Soft. Bowel sounds are normal. She exhibits no distension. There is  tenderness. There is no rebound.   Mod-severe tenderness in RUQ, no rebound   Musculoskeletal: She exhibits no edema.   Neurological: She is alert and oriented to person, place, and time.   Skin: Skin is warm.   Psychiatric: Her behavior is normal.       Recent Labs      05/04/18   1123  05/05/18   0005   WBC  11.6*  10.9*   RBC  4.19*  3.64*   HEMOGLOBIN  14.0  11.9*   HEMATOCRIT  42.0  37.0   MCV  100.2*  101.6*   MCH  33.4*  32.7   MCHC  33.3*  32.2*   RDW  47.0  47.8   PLATELETCT  263  219   MPV  9.7  10.0     Recent Labs      05/04/18   1123  05/05/18   0006   SODIUM  131*  134*   POTASSIUM  4.0  4.0   CHLORIDE  99  102   CO2  23  25   GLUCOSE  122*  103*   BUN  14  12   CREATININE  0.75  0.61   CALCIUM  10.3*  9.2     Recent Labs      05/04/18   1123   APTT  23.7*   INR  1.11                  Assessment/Plan     * Intractable abdominal pain- (present on admission)   Assessment & Plan    Due to liver mets; needs better pain control; was on Oxycodone 10 mg at home which has not been adequate;  Start MS Contin along with Dilaudid IV PRN.  Consider Palliative Care consult if not better.        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 Less likely ovarian CA per Dr. Fernandes.  Further treatment per Dr. Fernandes.        Intractable nausea and vomiting- (present on admission)   Assessment & Plan    Better today; tolerates solid food so far; cont anti emetic/supportive care        Hyponatremia- (present on admission)   Assessment & Plan    Mild, maybe due to hypovolemia; on IVF with improvement.  F/u labs.        HTN (hypertension)- (present on admission)   Assessment & Plan    Stable; cont current rx          Quality-Core Measures

## 2018-05-05 NOTE — ASSESSMENT & PLAN NOTE
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   Less likely ovarian CA per Dr. Fernandes given the abscence of usual symptoms (carcinomatosis/ileus that would usually be present if ovarian had gone to liver)  Current tissue undergoing further stains - if still not diagnostic then patient   D/w Dr Aleman - suspicion for cholangiocarcinoma, will need PORT prior to chemotherapy initiation.  PET scan scheduled for May 21. 1230pm

## 2018-05-06 PROBLEM — E87.1 HYPONATREMIA: Status: RESOLVED | Noted: 2018-01-01 | Resolved: 2018-01-01

## 2018-05-06 NOTE — PROGRESS NOTES
Patient received stable, c/o pain in the ruq 7/10, allergies noted, alert times 4, full code, iv access patent and hep lock, ambulates with front wheel walker, last bm greater than 5 days ago, will continue to monitor.

## 2018-05-06 NOTE — PROGRESS NOTES
[facility]ist Progress Note    Date of Service: 2018    Chief Complaint  63 y.o. female admitted 2018 with progressive, severe RUQ abdom pain with n/v over last several weeks    Interval Problem Update  Pain better controlled although not optimally yet; still with 8/10 abd pain with taking deep breaths or with certain movement.  Eating better, less n/v.    Consultants/Specialty  Gyne Onc    Disposition  TBD        Review of Systems   Constitutional: Negative for fever.   HENT: Negative for hearing loss.    Eyes: Negative for blurred vision.   Respiratory: Negative for cough.    Cardiovascular: Negative for chest pain.   Gastrointestinal: Positive for abdominal pain and nausea.   Genitourinary: Negative for dysuria.   Musculoskeletal: Negative for myalgias.   Skin: Negative for rash.   Neurological: Negative for dizziness.      Physical Exam  Laboratory/Imaging   Hemodynamics  Temp (24hrs), Av.6 °C (97.9 °F), Min:36.3 °C (97.4 °F), Max:36.8 °C (98.3 °F)   Temperature: 36.5 °C (97.7 °F)  Pulse  Av.9  Min: 75  Max: 93    Blood Pressure: 110/62      Respiratory      Respiration: 17, Pulse Oximetry: 95 %        RUL Breath Sounds: Clear, RML Breath Sounds: Clear, RLL Breath Sounds: Clear, JACQUELYN Breath Sounds: Clear, LLL Breath Sounds: Clear    Fluids    Intake/Output Summary (Last 24 hours) at 18 1048  Last data filed at 18 1800   Gross per 24 hour   Intake             1480 ml   Output                0 ml   Net             1480 ml       Nutrition  Orders Placed This Encounter   Procedures   • Diet Order     Standing Status:   Standing     Number of Occurrences:   1     Order Specific Question:   Diet:     Answer:   Hepatic [9]     Physical Exam   Constitutional: She is oriented to person, place, and time. No distress.   HENT:   Head: Normocephalic and atraumatic.   Eyes: EOM are normal.   Neck: Neck supple.   Cardiovascular: Normal rate and regular rhythm.    Pulmonary/Chest: Effort normal  and breath sounds normal. No respiratory distress.   Abdominal: Soft. Bowel sounds are normal. She exhibits no distension. There is tenderness. There is no rebound.   Mod-severe tenderness in RUQ, no rebound   Musculoskeletal: She exhibits no edema.   Neurological: She is alert and oriented to person, place, and time.   Skin: Skin is warm.   Psychiatric: Her behavior is normal.       Recent Labs      05/04/18   1123  05/05/18   0005  05/06/18   0036   WBC  11.6*  10.9*  11.4*   RBC  4.19*  3.64*  3.64*   HEMOGLOBIN  14.0  11.9*  12.0   HEMATOCRIT  42.0  37.0  37.2   MCV  100.2*  101.6*  102.2*   MCH  33.4*  32.7  33.0   MCHC  33.3*  32.2*  32.3*   RDW  47.0  47.8  47.6   PLATELETCT  263  219  216   MPV  9.7  10.0  10.0     Recent Labs      05/04/18   1123  05/05/18   0006  05/06/18   0036   SODIUM  131*  134*  136   POTASSIUM  4.0  4.0  4.0   CHLORIDE  99  102  104   CO2  23  25  25   GLUCOSE  122*  103*  139*   BUN  14  12  9   CREATININE  0.75  0.61  0.59   CALCIUM  10.3*  9.2  9.0     Recent Labs      05/04/18   1123   APTT  23.7*   INR  1.11                  Assessment/Plan     * Intractable abdominal pain- (present on admission)   Assessment & Plan    Due to liver mets; needs better pain control; was on Oxycodone 10 mg at home which has not been adequate;  Start MS Contin along with Dilaudid IV PRN.  Improved pain control, titrate pain meds.  Consider Palliative Care consult if not better.        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 Less likely ovarian CA per Dr. Fernandes.  Further treatment per Dr. Fernandes, Dr. Aleman.  PET done few days ago per patient, no results available.  May need port placement for chemo per patient, need to d/w Dr. Aleman.        Intractable nausea and vomiting- (present on admission)   Assessment & Plan    Better today; tolerates solid  food so far; cont anti emetic/supportive care        HTN (hypertension)- (present on admission)   Assessment & Plan    Stable; cont current rx          Quality-Core Measures

## 2018-05-06 NOTE — PROGRESS NOTES
Assumed care of pt at 1845. Received report from BERHANE Roberson. Pt A/O x 4, resting in bed.  PIV in place - running NS at 75mL/hr. Pain is being controlled with Dilaudid. Pt has not had a BM in several days, will administer Miralax with 2100 meds.  Discussed POC for night with pt at bedside. No complaints of pain or nausea. Pt up self, on room air. Bed in lowest, locked position. Call light and personal belongings within reach. Pt has no further questions or concerns at this time. Hourly rounding in place.

## 2018-05-07 PROBLEM — R11.2 INTRACTABLE NAUSEA AND VOMITING: Status: RESOLVED | Noted: 2018-01-01 | Resolved: 2018-01-01

## 2018-05-07 NOTE — PROGRESS NOTES
No changes from epic. Aox4 VSS. Up self with FWW. Pain and nausea controlled with current regimen. Did require IV compazine after x1 episode of emesis. PIV SL. Pt anxious to go home. Unknown plan of care at this time- home versus further workup. Pt states she might get a port placement. Call light and belongings within reach, able to make needs known, rounding in place, will monitor.

## 2018-05-07 NOTE — DISCHARGE PLANNING
Clinical Note per Chart Review:   CC: RUQ pain, N/V, Direct Admit from [facility].   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  Followed by Dr. Aleman, and JESSICA Foster, RN.     Consults:  PT    Compazine 5-10 mg every 4 hrs PRN N/V  Hepatic Diet    Discharge Plan: Will continue to assess for discharge planning needs.

## 2018-05-07 NOTE — DIETARY
Nutrition Services: Day 3 of admit.  Tonya Collins is a 63 y.o. female with admitting DX of Liver metastasis  Consult received for Poor PO & Wt Loss on Nutrition Admit Screen     Assessment:  Ht: 172.7 cm, Wt 5/5: 64.2 kg via stand up scale, BMI: 21.52  Diet/Intake: Hepatic - Per chart pt PO 25-75%     Evaluation:   1. Attempted to visit pt, pt was busy with other discipline.   2. Per chart review pt's wt on 2/28 - 66.4 kg via stand up scale. Wt loss percent is 3.3%, which is not significant however it is worth noting.   3. Meds: wellbutrin-sr, neurontin, ms contin, pericolace, phenergan (prn)  4. Last BM: 5/1    Recommendations/Plan:  1. Encourage intake of meals  2. Document intake of all meals as % taken in ADL's to provide interdisciplinary communication across all shifts.   3. Monitor weight.  4. Nutrition rep will continue to see patient for ongoing meal and snack preferences.  5. Obtain supplement order per RD as needed.    RD following

## 2018-05-07 NOTE — DISCHARGE PLANNING
TCN spoke to Dr. Sifuentes regarding his thoughts about a Palliative Care Consult for Advanced Care Planning and GOC due to pts diagnosis/prognosis. He agreed the pt/family would benefit from a PC RN visit and will put the order in.      Plan: RUSSELL to follow as needed     Updated: BERHANE Manzanares Case Manager; JAVIER De Los Santos           Thank you for allowing this Transitional Care Navigator to participate in this patient's care. Please feel free to call [phone] with any questions or concerns

## 2018-05-07 NOTE — PROGRESS NOTES
[facility] Hospitalist Progress Note    Date of Service: 2018    Chief Complaint  63 y.o. female admitted 2018 with progressive, severe RUQ abdom pain with n/v over last several weeks    Interval Problem Update  Pain better, between -7/10 involving RUQ.  Nausea is controlled and patient tolerates po.  Walked in hallway earlier.      Consultants/Specialty  Gyne Onc    Disposition  TBD        Review of Systems   Constitutional: Negative for fever.   HENT: Negative for hearing loss.    Eyes: Negative for blurred vision.   Respiratory: Negative for cough.    Cardiovascular: Negative for chest pain.   Gastrointestinal: Positive for abdominal pain and nausea.   Genitourinary: Negative for dysuria.   Musculoskeletal: Negative for myalgias.   Skin: Negative for rash.   Neurological: Negative for dizziness.      Physical Exam  Laboratory/Imaging   Hemodynamics  Temp (24hrs), Av.7 °C (98 °F), Min:36.2 °C (97.1 °F), Max:37.2 °C (98.9 °F)   Temperature: 37.2 °C (98.9 °F)  Pulse  Av.7  Min: 75  Max: 93    Blood Pressure: 124/74      Respiratory      Respiration: 19, Pulse Oximetry: 91 %        RUL Breath Sounds: Clear, RML Breath Sounds: Diminished, RLL Breath Sounds: Diminished, JACQUELYN Breath Sounds: Clear, LLL Breath Sounds: Diminished    Fluids    Intake/Output Summary (Last 24 hours) at 18 1220  Last data filed at 18 0820   Gross per 24 hour   Intake              240 ml   Output                0 ml   Net              240 ml       Nutrition  Orders Placed This Encounter   Procedures   • Diet Order     Standing Status:   Standing     Number of Occurrences:   1     Order Specific Question:   Diet:     Answer:   Hepatic [9]     Physical Exam   Constitutional: She is oriented to person, place, and time. No distress.   HENT:   Head: Normocephalic and atraumatic.   Eyes: EOM are normal.   Neck: Neck supple.   Cardiovascular: Normal rate and regular rhythm.    Pulmonary/Chest: Effort normal and breath sounds  normal. No respiratory distress.   Abdominal: Soft. Bowel sounds are normal. She exhibits no distension. There is tenderness. There is no rebound.   Mod tenderness in RUQ, no rebound   Musculoskeletal: She exhibits no edema.   Neurological: She is alert and oriented to person, place, and time.   Skin: Skin is warm.   Psychiatric: Her behavior is normal.       Recent Labs      05/05/18   0005  05/06/18   0036   WBC  10.9*  11.4*   RBC  3.64*  3.64*   HEMOGLOBIN  11.9*  12.0   HEMATOCRIT  37.0  37.2   MCV  101.6*  102.2*   MCH  32.7  33.0   MCHC  32.2*  32.3*   RDW  47.8  47.6   PLATELETCT  219  216   MPV  10.0  10.0     Recent Labs      05/05/18   0006  05/06/18   0036   SODIUM  134*  136   POTASSIUM  4.0  4.0   CHLORIDE  102  104   CO2  25  25   GLUCOSE  103*  139*   BUN  12  9   CREATININE  0.61  0.59   CALCIUM  9.2  9.0                      Assessment/Plan     * Intractable abdominal pain- (present on admission)   Assessment & Plan    Due to liver mets; pain better controlled although not optimal yet;  Now on MS Contin along with Dilaudid po/IV PRN. titrate pain meds.    Palliative care consult.        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 Less likely ovarian CA per Dr. Fernandes.  Further treatment per Dr. Fernandes, Dr. Aleman.  PET done few days ago per patient, no results available.  May need port placement for chemo per patient, need to d/w Dr. Aleman.        HTN (hypertension)- (present on admission)   Assessment & Plan    Stable; cont current rx          Quality-Core Measures

## 2018-05-07 NOTE — THERAPY
"Pt is a 64 y/o female admitted for R upper quadrant pain with hx of liver mass and undifferentiated adenocarcinoma. Pt is very independent and motivated to maintain such. Pt does endorse good support from family at home. Pt presents to physical therapy near PLOF baseline. Noted improved activity tolerance, balance and ambulation with FWW. Pt will benefit from acute PT interventions to address present impairments noted below.  Please continue to allow pt to ambulate in hallways with nursing and/or family. Pt does have peripheral neuropathy and is aware to ask for help if feeling more unsteady (particularly mornings)      Physical Therapy Evaluation completed.   Bed Mobility:  Supine to Sit: Modified Independent (HOB elevated)  Transfers: Sit to Stand: Supervised  Gait: Level Of Assist: Contact Guard Assist (-> SBA) with Front-Wheel Walker  x500 ft     Plan of Care: Will benefit from Physical Therapy x2 more sessions to assess needs for home.  Discharge Recommendations: Equipment: Pt may benefit from either FWW or 4WW upon D/C home- will continue to assess which device is most appropraite at this time.   Post-acute therapy: Believe pt should be able to D/C home with family assist, may benefit from HH therapy follow up depending on medical POC    See \"Rehab Therapy-Acute\" Patient Summary Report for complete documentation.     "

## 2018-05-07 NOTE — CARE PLAN
Problem: Safety  Goal: Will remain free from falls    Intervention: Implement fall precautions  Bed locked and in lowest position. Non-skid socks in place.      Problem: Venous Thromboembolism (VTW)/Deep Vein Thrombosis (DVT) Prevention:  Goal: Patient will participate in Venous Thrombosis (VTE)/Deep Vein Thrombosis (DVT)Prevention Measures    Intervention: Assess and monitor for anticoagulation complications  No s/s of bleeding with SQ heparin therapy, monitoring

## 2018-05-07 NOTE — CARE PLAN
Problem: Safety  Goal: Will remain free from falls  Outcome: PROGRESSING AS EXPECTED  Bed in lowest, locked position. Call light and personal belongings within reach. Night light provided. Room free from clutter.     Problem: Bowel/Gastric:  Goal: Normal bowel function is maintained or improved  Outcome: PROGRESSING AS EXPECTED  Assessed pt's bowel sounds and abdomen. Encouraged ambulation and hydration. Administered medication to promote BM per MAR.

## 2018-05-08 NOTE — DISCHARGE PLANNING
Medical Social Work    Awaiting PET scan results to determine POC, per palliative.     SW following.

## 2018-05-08 NOTE — PROGRESS NOTES
No changes from epic. AOx4 VSS. Desaturates into high 70's when sleeping but promptly goes up to 90's when woken up, pt states it hurts to take deep breaths and probably shallow breathes during sleep. O2 provided for when sleeping, otherwise on RA,  in use to monitor. Up self with FWW. Pain and nausea controlled with current regimen, denies any acute complaints at this time. Pt anxious to go home. Unknown plan of care at this time- home versus further workup and port placement. Call light and belongings within reach, able to make needs known, rounding in place, will monitor.

## 2018-05-08 NOTE — PROGRESS NOTES
Bedside report received. Assumed patient care at 1900. Pt updated on POC. Pt up self with steady gait with FWW. Pt resting in bed. No complaints of nausea; medicated per MAR for pain. Pt abdomen, RUQ/RLQ tender to touch and distended. Pt eager for discharge soon. No bowel movement yet, pt did drink prune juice and took Senna tonight with meds--will monitor.  No IV access and pt declined any attempt at insertion--she reported no need if she is discharging tomorrow. Safety precautions in place and call light within reach. All needs met at this time.

## 2018-05-08 NOTE — CARE PLAN
Problem: Bowel/Gastric:  Goal: Normal bowel function is maintained or improved  Outcome: PROGRESSING SLOWER THAN EXPECTED  Medicated per MAR with stool softener; PO intake of prune juice. Encouraging PO intake of fluids and ambulation to help with bowel passage.     Problem: Knowledge Deficit  Goal: Knowledge of disease process/condition, treatment plan, diagnostic tests, and medications will improve  Outcome: PROGRESSING AS EXPECTED  Pt educated regarding plan of care and medications. All questions answered.

## 2018-05-08 NOTE — CONSULTS
"Reason for PC Consult: Advance Care Planning    Consulted by: Dr. Hernandez    Assessment:  General: Pt is 63 YOF who was admitted to [facility] on 4/13/18 for severe nausea and abdominal pain x 2 days. Pt then transferred to [facility] on 4/13/18.  Pt has PMH: Liver mass, htn, hyperlipidemia, malignant neoplasm of the uterus and ovaries.  Awaiting PET scan results.     Dyspnea: No.   Last BM: 05/01/18   Pain: No- Per RN Assessment  Depression: Yes- Pt admits to having depression.  Dementia: No       Spiritual:  Is Hoahaoism or spirituality important for coping with this illness? No   Has a  or spiritual provider visit been requested? No    Palliative Performance Scale: 60%    Advance Directive: None- Advance Dir completed at this encounter. Statement of Desires - 2, 3, and 5 selected. Signed, Notarized, and original scanned into chart.   DPOA: No. Completed during this encounter. Primary, Hanna SOUSA [phone], and alternate, son, Marcin Collins [phone].    POLST: No- POLST completed during this encounter. No CPR, selective treatments, No Artificial nutrition, No IV fluids. Signed by pt and Dr. Melara and scanned original into EPIC. Original to pt's chart. To be given to pt upon d/c.     Code Status: Full- Addressed at this encounter, pt made choice for DNR.    Outcome:  PC RN met with pt at pt's bedside. Pt has been living with her son, Vincenzo, and Hanna SOUSA, in [place], [place].for approximately 1 year. Previously, pt was living alone in her Mobile Home in [place], [place].  Pt has 2 grandchildren, Keith, and Barb.  Pt has an older son, Marcin, who lives in [place], [place]. Marcin visits frequently and stays in pt's Mobile Home.     PC RN described the role of Palliative Care, pt's goals of care, and advance care planning with pt. Pt verbalized understanding of her current medical condition. When PC RN asked about pt's wishes, pt immediately stated, \"I don't want to be kept alive on machines.\" PC RN " completed an Advance Directive and POLST form during consult.     PC RN provided active listening and emotional support during consult.    Updated: Dr. Melara, Palliative, BS RN Stephanie    Plan: Pt to return home with her son, Vincenzo, and Hanna SOUSA    Recommendations: I do not recommend an ethics or hospice consult at this time because pt is awaiting the PET scan results before making further medical decisions..    Thank you for allowing Palliative Care to participate in this patient's care. Please feel free to call [phone] with any questions or concerns.

## 2018-05-09 PROBLEM — R50.9 FEVER: Status: ACTIVE | Noted: 2018-01-01

## 2018-05-09 NOTE — CARE PLAN
Problem: Safety  Goal: Will remain free from falls    Intervention: Assess risk factors for falls  Fall precautions in place      Problem: Pain Management  Goal: Pain level will decrease to patient's comfort goal    Intervention: Follow pain managment plan developed in collaboration with patient and Interdisciplinary Team  Denies pain at this hour      Problem: Respiratory:  Goal: Respiratory status will improve    Intervention: Administer and titrate oxygen therapy  Patient on 2L 02.  Chest xray today  Doing a home o2 check today

## 2018-05-09 NOTE — THERAPY
"Pt continues to make good progress with activity tolerance and gait. Discussed benefits of FWW and 4WW upon D/C home. Pt feels the 4WW will allow enough stability while providing an easy opportunity to sit and rest as needed. Pt demonstrated adequate balance capable of using 4WW upon D/C, encouraged pt to obtain FWW should she require greater support and stability and she moves forward with chemotherapy. PT will continue to follow while in house to progress activity, please continue to ambulate pt around unit 1-2x/day as tolerated.     Physical Therapy Treatment completed.   Bed Mobility:  Supine to Sit: Modified Independent  Transfers: Sit to Stand: Supervised  Gait: Level Of Assist: Stand by Assist with Front-Wheel Walker       Plan of Care: Will benefit from Physical Therapy x1 more visit to address other D/C needs  Discharge Recommendations: Equipment: 4-Wheel Walker.   Post-acute therapy: Pt declined need for HH therapies, would prefer Outpatient PT follow up as needed upon D/C from acute setting and upon initiating chemotherapy.      See \"Rehab Therapy-Acute\" Patient Summary Report for complete documentation.       "

## 2018-05-09 NOTE — ASSESSMENT & PLAN NOTE
CXR showing left lower lung atx vs pneumonia - empiric omnicef/azithromycin changed to levaquin for HCAP  Elevated WBC decreasing 13<--15<--15 <-- 15 <-- 17  UA - Trace LE - will be covered by levaquin  Right arm with thrombophlebitis from previous IV site - improving.  Urine culture pending

## 2018-05-09 NOTE — PROGRESS NOTES
Assumed pt care - bedside report received.    Pt is aaox4 - tearful after talking to Dr. Durbin - emotional support given.   febrile 100.4 - congested then runny nose per pt.  dr. Melara aware - ibuprofen given.  Labs, Ua sent, xray today.  Reports right arm pain and headache - prn pain meds given with good relief.  Up with x1 assist/fww - PT worked with pt this morning.  Right arm red and tender where previous IV - ice applied.  Loose stool last night.  Voids without difficulty - very dark yellow urine.  Pt makes needs known - will continue to round.

## 2018-05-09 NOTE — PROGRESS NOTES
[facility] Hospitalist Progress Note    Date of Service: 5/9/2018    Chief Complaint  63 y.o. female admitted 5/4/2018 with uncontrolled RUQ pain, adenocarcinoma on biopsy of liver but there is confusion to primary source.    Interval Problem Update  5/8 Patient's pain is under good control with current regimen but she is having hypoxia while sleeping and poor mentation on waking - likely over sedated with current narcotic regimen.  I've reduced the dose of dilaudid and keeping long acting MS contin dose the same.  5/9 Patient stopped taking dilaudid yesterday with some improvement in mentation but today started having fever, headache and malaise.  UA shows trace LE and CXR concerning for infiltrate left lower lobe and increasing WBC.  Empiric rocephin/azitromycin started to cover both lung and urine possibilities.  Dr Aleman evaluated patient this am and has a pathology report at the office stating possible cholangiocarcinoma - the first of the three results on tissue taken 4/19 - clinically would make more sense for this presentation.  Once patient is stable for discharge, a PET scan and PORT placement will need coordination.  Patient worked with PT today and Four Wheeled Walker recommended for discharge - order placed.    Consultants/Specialty  D/w Dr Fernandes  D/w Dr Aleman    Disposition  Home soon  Four wheeled walker          Review of Systems   Constitutional: Positive for fever and malaise/fatigue. Negative for chills.   HENT: Negative for congestion and sore throat.    Eyes: Negative for blurred vision and photophobia.   Respiratory: Negative for cough and shortness of breath.    Cardiovascular: Negative for chest pain, claudication and leg swelling.   Gastrointestinal: Positive for abdominal pain (improved). Negative for constipation, diarrhea, heartburn, nausea and vomiting.   Genitourinary: Negative for dysuria and hematuria.   Musculoskeletal: Positive for myalgias (hip pain). Negative for joint pain.   Skin:  Negative for itching and rash.   Neurological: Negative for dizziness, sensory change, speech change, weakness and headaches.   Psychiatric/Behavioral: Negative for depression. The patient is not nervous/anxious and does not have insomnia.       Physical Exam  Laboratory/Imaging   Hemodynamics  Temp (24hrs), Av.1 °C (98.7 °F), Min:36.2 °C (97.2 °F), Max:38 °C (100.4 °F)   Temperature: 36.6 °C (97.9 °F)  Pulse  Av  Min: 75  Max: 108    Blood Pressure: 104/63      Respiratory      Respiration: 16, Pulse Oximetry: 93 %        RUL Breath Sounds: Clear, RML Breath Sounds: Diminished, RLL Breath Sounds: Diminished, JACQUELYN Breath Sounds: Clear, LLL Breath Sounds: Diminished    Fluids    Intake/Output Summary (Last 24 hours) at 18 1613  Last data filed at 18 1100   Gross per 24 hour   Intake              720 ml   Output                0 ml   Net              720 ml       Nutrition  Orders Placed This Encounter   Procedures   • Diet Order     Standing Status:   Standing     Number of Occurrences:   1     Order Specific Question:   Diet:     Answer:   Hepatic [9]     Physical Exam   Constitutional: She is oriented to person, place, and time. She appears well-developed and well-nourished. No distress.   HENT:   Head: Normocephalic and atraumatic.   Eyes: Conjunctivae are normal. No scleral icterus.   Neck: Neck supple. No JVD present.   Cardiovascular: Normal rate, regular rhythm and normal heart sounds.  Exam reveals no gallop and no friction rub.    No murmur heard.  Pulmonary/Chest: Effort normal and breath sounds normal. No respiratory distress. She has no rales. She exhibits no tenderness.   Abdominal: Soft. Bowel sounds are normal. She exhibits no distension. There is tenderness (mild RUQ pain). There is no guarding.   Musculoskeletal: She exhibits tenderness (right forearm induration consistent with phlebitis). She exhibits no edema.   Neurological: She is alert and oriented to person, place, and time.  No cranial nerve deficit.   Mentation improved today     Skin: Skin is warm and dry. She is not diaphoretic. No erythema. No pallor.   Psychiatric: She has a normal mood and affect. Her behavior is normal.   Nursing note and vitals reviewed.      Recent Labs      05/07/18   2344  05/09/18   1123   WBC  13.3*  17.8*   RBC  3.56*  3.76*   HEMOGLOBIN  11.9*  12.4   HEMATOCRIT  36.8*  38.7   MCV  103.4*  102.9*   MCH  33.4*  33.0   MCHC  32.3*  32.0*   RDW  49.3  50.1*   PLATELETCT  204  211   MPV  10.0  9.9     Recent Labs      05/07/18   2344  05/09/18   1123   SODIUM  138  136   POTASSIUM  4.4  3.8   CHLORIDE  100  99   CO2  30  27   GLUCOSE  120*  133*   BUN  9  12   CREATININE  0.61  0.64   CALCIUM  9.3  9.5                      Assessment/Plan     * Intractable abdominal pain- (present on admission)   Assessment & Plan    Due to liver mets; pain better controlled although not optimal yet;  Was on MS Contin 45mg bid  along with Dilaudid po/IV PRN.   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   Less likely ovarian CA per Dr. Fernandes given the abscence of usual symptoms (carcinomatosis/ileus that would usually be present if ovarian had gone to liver)  Current tissue undergoing further stains - if still not diagnostic then patient will have EX-Lap biospy with Dr Fernandes at a later date.  PET was not yet done, was regular CT  Will need PET scan scheduled prior to discharge.        Fever   Assessment & Plan    CXR showing left lower lung atx vs pneumonia - empiric rocephin/azithromycin  Elevated WBC - 17  UA - Trace LE - will be covered by rocephin  Right arm with thrombophlebitis from previous IV site          HTN (hypertension)- (present on  admission)   Assessment & Plan    Stable; cont current rx          Quality-Core Measures   Reviewed items::  Labs reviewed, Medications reviewed and Radiology images reviewed  Duarte catheter::  No Duarte  DVT prophylaxis pharmacological::  Heparin  Assessed for rehabilitation services:  Patient was assess for and/or received rehabilitation services during this hospitalization

## 2018-05-09 NOTE — CARE PLAN
Problem: Nutritional:  Goal: Achieve adequate nutritional intake  Patient will consume >50% of meals   Outcome: PROGRESSING AS EXPECTED  Pt reports only able to to eat ~ 25% of meals and tries to drink ~ one-two Boost drinks/day (brought from home). Pt states she just has poor appetite. Pt requesting Boost Plus be sent twice daily with meals as family is not always available to bring them from home. RD added supplements to meals per pt request. RD following to monitor for consistently adequate intake.

## 2018-05-09 NOTE — PROGRESS NOTES
[facility] Hospitalist Progress Note    Date of Service: 2018    Chief Complaint  63 y.o. female admitted 2018 with uncontrolled RUQ pain, adenocarcinoma on biopsy of liver but there is confusion to primary source.    Interval Problem Update  Patient's pain is under good control with current regimen but she is having hypoxia while sleeping and poor mentation on waking - likely over sedated with current narcotic regimen.  I've reduced the dose of dilaudid and keeping long acting MS contin dose the same.    Consultants/Specialty  D/w Dr Fernandes    Disposition  Home when pain controled without poor mentation/hypoxia.        Review of Systems   Constitutional: Negative for chills and fever.   HENT: Negative for congestion and sore throat.    Eyes: Negative for blurred vision and photophobia.   Respiratory: Negative for cough and shortness of breath.    Cardiovascular: Negative for chest pain, claudication and leg swelling.   Gastrointestinal: Positive for abdominal pain (improved). Negative for constipation, diarrhea, heartburn, nausea and vomiting.   Genitourinary: Negative for dysuria and hematuria.   Musculoskeletal: Negative for joint pain and myalgias.   Skin: Negative for itching and rash.   Neurological: Negative for dizziness, sensory change, speech change, weakness and headaches.   Psychiatric/Behavioral: Negative for depression. The patient is not nervous/anxious and does not have insomnia.       Physical Exam  Laboratory/Imaging   Hemodynamics  Temp (24hrs), Av.8 °C (98.2 °F), Min:36.6 °C (97.8 °F), Max:37.1 °C (98.7 °F)   Temperature: 36.8 °C (98.2 °F)  Pulse  Av.5  Min: 75  Max: 108    Blood Pressure: 108/62      Respiratory      Respiration: 14, Pulse Oximetry: 90 %        RUL Breath Sounds: Clear, RML Breath Sounds: Diminished, RLL Breath Sounds: Diminished, JACQUELYN Breath Sounds: Clear, LLL Breath Sounds: Diminished    Fluids    Intake/Output Summary (Last 24 hours) at 18 0267  Last data filed  at 05/08/18 0700   Gross per 24 hour   Intake              240 ml   Output                0 ml   Net              240 ml       Nutrition  Orders Placed This Encounter   Procedures   • Diet Order     Standing Status:   Standing     Number of Occurrences:   1     Order Specific Question:   Diet:     Answer:   Hepatic [9]     Physical Exam   Constitutional: She is oriented to person, place, and time. She appears well-developed and well-nourished. No distress.   HENT:   Head: Normocephalic and atraumatic.   Eyes: Conjunctivae are normal. No scleral icterus.   Neck: Neck supple. No JVD present.   Cardiovascular: Normal rate, regular rhythm and normal heart sounds.  Exam reveals no gallop and no friction rub.    No murmur heard.  Pulmonary/Chest: Effort normal and breath sounds normal. No respiratory distress. She exhibits no tenderness.   Abdominal: Soft. Bowel sounds are normal. She exhibits no distension. There is tenderness (mild RUQ pain). There is no guarding.   Musculoskeletal: She exhibits no edema or tenderness.   Neurological: She is alert and oriented to person, place, and time. No cranial nerve deficit.   Skin: Skin is warm and dry. She is not diaphoretic. No erythema. No pallor.   Psychiatric: She has a normal mood and affect. Her behavior is normal.   Nursing note and vitals reviewed.      Recent Labs      05/06/18   0036  05/07/18   2344   WBC  11.4*  13.3*   RBC  3.64*  3.56*   HEMOGLOBIN  12.0  11.9*   HEMATOCRIT  37.2  36.8*   MCV  102.2*  103.4*   MCH  33.0  33.4*   MCHC  32.3*  32.3*   RDW  47.6  49.3   PLATELETCT  216  204   MPV  10.0  10.0     Recent Labs      05/06/18   0036  05/07/18   2344   SODIUM  136  138   POTASSIUM  4.0  4.4   CHLORIDE  104  100   CO2  25  30   GLUCOSE  139*  120*   BUN  9  9   CREATININE  0.59  0.61   CALCIUM  9.0  9.3                      Assessment/Plan     * Intractable abdominal pain- (present on admission)   Assessment & Plan    Due to liver mets; pain better controlled  although not optimal yet;  Now on MS Contin 45mg bid  along with Dilaudid po/IV PRN.   Patient altered in am when woken - hypoxic when sleeping - too sedated on current regimen - decrease PO dilaudid, if pain is not well controlled with this decrease, will consider increasing MS contin and leaving dilaudid same.          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   Less likely ovarian CA per Dr. Fernandes given the abscence of usual symptoms (carcinomatosis/ileus that would usually be present if ovarian had gone to liver)  Current tissue undergoing further stains - if still not diagnostic then patient will have EX-Lap biospy with Dr Fernandes at a later date.  PET done few days ago per patient, patient unsure where she had test completed - not in [facility] so not at [facility] facility.           HTN (hypertension)- (present on admission)   Assessment & Plan    Stable; cont current rx          Quality-Core Measures   Reviewed items::  Labs reviewed, Medications reviewed and Radiology images reviewed  Duarte catheter::  No Duarte  DVT prophylaxis pharmacological::  Heparin

## 2018-05-09 NOTE — CARE PLAN
Problem: Safety  Goal: Will remain free from injury  Outcome: PROGRESSING AS EXPECTED  Calls for help, uses walker

## 2018-05-09 NOTE — PROGRESS NOTES
Pt alert and oriented x4 on RA, sitting up o2= 90%. Pt educated to take deep breaths and cough hard. Pt states she does not want anymore dilaudid. Discussed frequency and dose of MS contin, wearing o2 and keeping  monitor on. Pt states understanding. Pain to right abdomen is severe and now the left hip is painful from laying only on the left side. Pt has diarrhea, help senna. No IV call-light and items within reach pt walks with steady gait to bathroom with walker

## 2018-05-10 PROBLEM — M25.552 LEFT HIP PAIN: Status: ACTIVE | Noted: 2018-01-01

## 2018-05-10 NOTE — CARE PLAN
Problem: Safety  Goal: Will remain free from falls  Outcome: PROGRESSING AS EXPECTED  Assess risk factors for falls  Fall precautions in place                 Problem: Pain Management  Goal: Pain level will decrease to patient's comfort goal  Outcome: PROGRESSING AS EXPECTED  Follow pain managment plan developed in collaboration with patient and Interdisciplinary Team  Denies pain at this hour

## 2018-05-10 NOTE — DISCHARGE PLANNING
Received choice form from ELIZABETH De Los Santos for patients DME services, referral has been sent to [facility] per patient request.

## 2018-05-10 NOTE — DISCHARGE PLANNING
Anticipated Discharge Disposition: Home with 4-wheel walker    Action: Met with pt at bedside to discuss discharge plan and resources.  Pt lives with her son and daughter-in-law.  States she would be interested in HH but her son and daughter-in-law don't want people coming into the home so she cannot sign up for this service at this time.  Pt states she feels safe in her home and denies abuse.  She expressed she would like to move out and has been speaking with her other son about possibly moving in with him.  Discussed choice for 4-wheel-walker, [facility] [employer]  Pt states she would like a 4WW with a seat.  SW indicated insurance may not cover a 4WW with a seat, [facility] and provided her [facility] application.  Also provided pt with [facility] community resource guide and list of HHC agencies in the event pt decides she needs HHC.  No other questions/concerns at this time.    Barriers to Discharge: None    Plan: Pt discharging home pending delivery of 4WW.  SW to remain available as needed.

## 2018-05-10 NOTE — CONSULTS
DATE OF SERVICE:  05/09/2018    REASON FOR CONSULTATION:  Cholangiocarcinoma.    HISTORY OF PRESENT ILLNESS:  This is a very pleasant 63-year-old who   established care with me back in the office on 04/27/2018.  She was in [facility] for some time and being worked up as an outpatient by Dr. Echeverria.  She was having increased nausea and vomiting back on 04/13/2018   when she originally presented.  She had a liver biopsy at that time, which   came back as a poorly-differentiated adenocarcinoma, and they were worried   that it was more of an upper GI source.  She had upper and lower endoscopy and   that is what was favored.  She had reported CK20 positive at that time.  CK7   and CDX2 and TTF-1 negative.  She stayed in the hospital.  She had repeat   biopsies of the liver done back on 04/18/2018.  This was reviewed by Dr. North   in pathology.  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  She never had any ascites   or other abdominal presentation.  She was then referred for evaluation with   Dr. Juno Fernandes, who questioned the diagnosis as well and felt that her clinical   presentation did not fit.  This was rediscussed with pathology this past   week, and her pathology was sent out to [facility].  This has come back with   some preliminary findings suggestive of favoring cholangiocarcinoma.    She came in this hospitalization for nausea, vomiting, and abdominal   discomfort.  She says she is feeling better.  Her nausea is controlled.  Her   pain is better controlled as well.  Overall, she is doing good.  The only   complaint she really has is some pain on the right forearm, where she had an   IV.    PAST MEDICAL HISTORY:  Uterine cancer at age 23 with total abdominal   hysterectomy and oophorectomy.  She has had migraines, anemia in the past,   liver biopsy as above, hysterectomy and oophorectomy as reported.    SOCIAL HISTORY:  She is a  former smoker.  She has 2 children.    REVIEW OF SYSTEMS:  As above, otherwise negative.    PHYSICAL EXAMINATION:  GENERAL:  She is alert and oriented x3.  Neurologically intact.  She has had a   temperature that was documented, otherwise in no other acute distress.  ABDOMEN:  Soft.  She has some pain in the right upper quadrant, but no rebound   tenderness.  EXTREMITIES:  No lower extremity swelling.  NEUROLOGICAL:  She is otherwise intact.  She has some tenderness in the right   forearm, where she has had her IV with some minimal redness with no signs of   any cord or thrombosis on exam.    IMPRESSION AND PLAN:  A 63-year-old ECOG performance status of 1 with a   poorly-differentiated adenocarcinoma of the liver biopsy, originally felt to   be maybe favoring a gastrointestinal origin, and a repeat biopsy then was felt   to be an ovarian source; however, more staining and review from an outside   laboratory on that second biopsy was favoring potential cholangiocarcinoma at   this time.    I discussed with her that probably makes more sense on her presentation than   ovarian.  I told her at this current time I think controlling her pain and   nausea and trying to get her out of the hospital.    She also has poor venous access and she may have ultimately need a port   placement, but she will have to have this arm evaluated before we place any   catheters.  She will also need a PET CT as an outpatient since she has not had   one.    We briefly discussed if the final diagnosis does come back as a   cholangiocarcinoma with multiple lesions that in on these situation more or   likely than not it is just going to be palliative.  We talked about some   basics of cisplatin plus Gemzar based chemotherapy.    I relayed all this to Dr. Melara.  She is going to be seeing the patient and   evaluating her arm and following through with some of the recommendations.  I   have also discussed later that evening with Dr. Juno Fernandes, who  agrees as well.    We talked about goals of therapy, prognosis, and the palliative nature of   treatment.       ____________________________________     MD YAZAN Oleary / EMPERATRIZ    DD:  05/09/2018 22:38:29  DT:  05/09/2018 23:28:55    D#:  [id number]  Job#:  [id number]    cc: ERMELINDA ULRICH MD, MICHELLE LONGO MD

## 2018-05-10 NOTE — ASSESSMENT & PLAN NOTE
Minimal pain now  Examination consistent with greater trochanteric bursitis  Ice for pain and inflammation control

## 2018-05-11 NOTE — DISCHARGE PLANNING
Follow up Faraz spoke to Carmen Ruth is not contracted with insurance and sent referral to [facility]. Contacted [facility] spoke to Joao referral was not received. Re-sent at 1113. Notified ARAVIND Vidal via voicemail.

## 2018-05-11 NOTE — DIETARY
Nutrition Services: Update   Pt is currently on hepatic diet. Per ADLs, pt consuming % the last 6 meals.  Pt is also drinking one Boost per day, sometimes two.  Pt and family confirm pt has fair appetite and eating well.    Admit wt: 63.3 kg via stand up scale  Wt: 64.2 kg via stand up scale on 5/5 - wt fairly stable compared with admit wt    Recommendations/Plan:  1. Encourage intake of meals and snacks  2. Document intake of all meals and snacks as % taken in ADL's to provide interdisciplinary communication across all shifts.   3. Monitor weight; please obtain new wt when feasible  4. Nutrition rep will continue to see patient for ongoing meal and snack preferences.    RD available as needed

## 2018-05-11 NOTE — CARE PLAN
Problem: Nutritional:  Goal: Achieve adequate nutritional intake  Patient will consume >50% of meals   Outcome: MET Date Met: 05/11/18

## 2018-05-11 NOTE — PROGRESS NOTES
Patient is tearful and having pain. States today is not a good day and says she is trying to be ok. RN believes pt is having anxiety about being discharged.

## 2018-05-11 NOTE — THERAPY
"Physical Therapy Treatment completed.   Bed Mobility:  Supine to Sit: Modified Independent  Transfers: Sit to Stand: Supervised  Gait: Level Of Assist: Stand by Assist with Front-Wheel Walker       Plan of Care: Will benefit from Physical Therapy 3 times per week  Discharge Recommendations: Equipment: 4-Wheel Walker. Post-acute therapy   Discharge to home with outpatient or home health for additional skilled therapy services.     Patient able to perform bed<>chair transfer today at [facility] level. Ambulated 300 ft with FWW on RA, O2 Sats 90-93% for duration. 4WW not yet delivered to patient's room. Discussed mechanics, safety and brake use of 4WW with picture demo. Patient verbalized understanding. Advised patient to trial 4WW with nsg SBA prior to d/c. Will continue to follow while in house. Expect patient to be okay to d/c to home with family and HH PT or OP PT, with use of 4WW.     4WW has not yet been delivered to patient's room.    See \"Rehab Therapy-Acute\" Patient Summary Report for complete documentation.       "

## 2018-05-11 NOTE — DISCHARGE PLANNING
S/w PT who advised pt's 4WW has not been delivered.  Per [facility] Al[facility]so Ruth is no longer contracted with [employer] and referral has been sent to [facility].

## 2018-05-11 NOTE — CARE PLAN
Problem: Psychosocial Needs:  Goal: Level of anxiety will decrease  Outcome: PROGRESSING SLOWER THAN EXPECTED  Patient is tearful and upset when discharge planning discussed. Pain medicated per MAR.

## 2018-05-11 NOTE — CARE PLAN
Problem: Safety  Goal: Will remain free from injury  Outcome: PROGRESSING AS EXPECTED  Pt educated on fall risk.  Pt's call light in reach. Non slip socks in use, and pt placed close to the nursing station.     Problem: Bowel/Gastric:  Goal: Normal bowel function is maintained or improved  Outcome: PROGRESSING AS EXPECTED  Per pt report she had a BM 05/10/18. Continue to drink fluids and eat meals.

## 2018-05-11 NOTE — PROGRESS NOTES
Assumed care of pt at 0645. Rc'd report from Heather. Pt is Aox4. Pt is up standby, with walker.  Denies nausea. Pt complains of pain, medicated per MAR. Hourly rounding in place. Call light within reach. Bed in lowest, locked position.

## 2018-05-11 NOTE — PROGRESS NOTES
[facility] Hospitalist Progress Note    Date of Service: 5/11/2018    Chief Complaint  63 y.o. female admitted 5/4/2018 with uncontrolled RUQ pain, adenocarcinoma on biopsy of liver but there is confusion to primary source.    Interval Problem Update  5/8 Patient's pain is under good control with current regimen but she is having hypoxia while sleeping and poor mentation on waking - likely over sedated with current narcotic regimen.  I've reduced the dose of dilaudid and keeping long acting MS contin dose the same.  5/9 Patient stopped taking dilaudid yesterday with some improvement in mentation but today started having fever, headache and malaise.  UA shows trace LE and CXR concerning for infiltrate left lower lobe and increasing WBC.  Empiric rocephin/azitromycin started to cover both lung and urine possibilities.  Dr Aleman evaluated patient this am and has a pathology report at the office stating possible cholangiocarcinoma - the first of the three results on tissue taken 4/19 - clinically would make more sense for this presentation.  Once patient is stable for discharge, a PET scan and PORT placement will need coordination.  Patient worked with PT today and Four Wheeled Walker recommended for discharge - order placed.  5/10 Patient feeling better today, states she is not confused nor disoriented like before with the higher dose pain medication.  She is having slightly more pain in her left hip over the trochanteric bursa and no erythema seen nor warmth palpated.  Recommended ice to the area for pain control.  She remains afebrile since yesterday am.  She is still requiring 2 liters of oxygen and hoping she does not need this for discharge home - will continue with IS and antibiotics to see if she can wean from this.  5/11 Patient feeling okay, no significant improvement compared to yesterday, still on 2 liters, has been 2 days since last bowel movement and only able to tolerate small meals before she gets satiated.   She is very tender most of her abdomen and liver palpated in all 4 quadrants.  Will order KUB to evaluate for stool burden vs other for increased tenderness of her abdomen.  If this does not show significant stool burden then will check CT abdomen/pelvis with oral contrast.  Blood cultures collected given continued WBC elevation and abx changed to levaquin.    Consultants/Specialty  D/w Dr Fernandes  D/w Dr Aleman    Disposition  Home soon  Four wheeled walker          Review of Systems   Constitutional: Positive for fever and malaise/fatigue. Negative for chills.   HENT: Negative for congestion and sore throat.    Eyes: Negative for blurred vision and photophobia.   Respiratory: Negative for cough, sputum production and shortness of breath.    Cardiovascular: Negative for chest pain, claudication and leg swelling.   Gastrointestinal: Positive for abdominal pain (improved overal but tender to palp.). Negative for constipation, diarrhea, heartburn, nausea and vomiting.   Genitourinary: Negative for dysuria and hematuria.   Musculoskeletal: Positive for myalgias (left hip pain - mild). Negative for joint pain.   Skin: Negative for itching and rash.   Neurological: Negative for dizziness, sensory change, speech change, weakness and headaches.   Psychiatric/Behavioral: Negative for depression. The patient is not nervous/anxious and does not have insomnia.       Physical Exam  Laboratory/Imaging   Hemodynamics  Temp (24hrs), Av.7 °C (98 °F), Min:36.2 °C (97.2 °F), Max:36.9 °C (98.5 °F)   Temperature: 36.8 °C (98.2 °F)  Pulse  Av.7  Min: 75  Max: 108    Blood Pressure: 130/69      Respiratory      Respiration: 18, Pulse Oximetry: 93 %        RUL Breath Sounds: Clear, RML Breath Sounds: Diminished, RLL Breath Sounds: Diminished, JACQUELYN Breath Sounds: Clear, LLL Breath Sounds: Diminished    Fluids  No intake or output data in the 24 hours ending 18 1557    Nutrition  Orders Placed This Encounter   Procedures   • Diet  Order     Standing Status:   Standing     Number of Occurrences:   1     Order Specific Question:   Diet:     Answer:   Hepatic [9]     Physical Exam   Constitutional: She is oriented to person, place, and time. She appears well-developed and well-nourished. No distress.   HENT:   Head: Normocephalic and atraumatic.   Eyes: Conjunctivae are normal. No scleral icterus.   Neck: Neck supple. No JVD present.   Cardiovascular: Normal rate, regular rhythm and normal heart sounds.  Exam reveals no gallop and no friction rub.    No murmur heard.  Pulmonary/Chest: Effort normal and breath sounds normal. No respiratory distress. She has no rales. She exhibits no tenderness.   Abdominal: Soft. Bowel sounds are normal. She exhibits no distension. There is tenderness (global tenderness today.). There is no guarding.   Musculoskeletal: She exhibits tenderness (right forearm induration consistent with phlebitis - improving, left trochanteric bursa - ttp). She exhibits no edema.   Neurological: She is alert and oriented to person, place, and time. No cranial nerve deficit.   Mentation normal.   Skin: Skin is warm and dry. She is not diaphoretic. No erythema. No pallor.   Psychiatric: She has a normal mood and affect. Her behavior is normal.   Nursing note and vitals reviewed.      Recent Labs      05/09/18   1123  05/09/18   2355  05/11/18   0034   WBC  17.8*  15.2*  15.1*   RBC  3.76*  3.38*  3.40*   HEMOGLOBIN  12.4  11.4*  11.3*   HEMATOCRIT  38.7  34.3*  34.4*   MCV  102.9*  101.5*  101.2*   MCH  33.0  33.7*  33.2*   MCHC  32.0*  33.2*  32.8*   RDW  50.1*  48.7  48.5   PLATELETCT  211  194  211   MPV  9.9  10.2  10.0     Recent Labs      05/09/18   1123  05/09/18 2355  05/11/18   0034   SODIUM  136  132*  135   POTASSIUM  3.8  4.1  4.4   CHLORIDE  99  100  101   CO2  27  28  27   GLUCOSE  133*  129*  122*   BUN  12  10  8   CREATININE  0.64  0.50  0.53   CALCIUM  9.5  9.5  9.6                      Assessment/Plan     *  Intractable abdominal pain- (present on admission)   Assessment & Plan    Due to liver mets; pain better controlled   MS Contin to 30mg bid and PRN MSIR as needed  Feeling distended and bloated today with poor po intake, KUB ordered and if doesn't show significant stool burden will follow up with CT abdomen with PO contrast only.            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   Less likely ovarian CA per Dr. Fernandes given the abscence of usual symptoms (carcinomatosis/ileus that would usually be present if ovarian had gone to liver)  Current tissue undergoing further stains - if still not diagnostic then patient   D/w Dr Aleman - suspicion for cholangiocarcinoma, will need PORT prior to chemotherapy initiation.  Will need PET scan scheduled for May 21. 1230pm        Left hip pain   Assessment & Plan    Minimal pain now  Examination consistent with greater trochanteric bursitis  Ice for pain and inflammation control          Fever   Assessment & Plan    CXR showing left lower lung atx vs pneumonia - empiric omnicef/azithromycin changed to levaquin for HCAP  Elevated WBC decreasing 15 <-- 15 <-- 17  UA - Trace LE - will be covered by levaquin  Right arm with thrombophlebitis from previous IV site - improving.  Urine culture pending            HTN (hypertension)- (present on admission)   Assessment & Plan    Stable; cont current rx          Quality-Core Measures   Reviewed items::  Labs reviewed, Medications reviewed and Radiology images reviewed  Duarte catheter::  No Duarte  DVT prophylaxis pharmacological::  Heparin  Assessed for rehabilitation services:  Patient was assess for and/or received rehabilitation services during this hospitalization

## 2018-05-12 NOTE — PROGRESS NOTES
Received report from day RN, assumed care of PT at 1900. PT A&Ox 4, sitting up in chair having ambulated with FWW, family at bedside, discussed plan of care for this shift.  Pain managed with meds per MAR. PT up SBA, safety precautions in place. Call light and personal possessions within reach.

## 2018-05-12 NOTE — PROGRESS NOTES
[facility] Hospitalist Progress Note    Date of Service: 5/12/2018    Chief Complaint  63 y.o. female admitted 5/4/2018 with uncontrolled RUQ pain, adenocarcinoma on biopsy of liver but there is confusion to primary source.    Interval Problem Update  5/8 Patient's pain is under good control with current regimen but she is having hypoxia while sleeping and poor mentation on waking - likely over sedated with current narcotic regimen.  I've reduced the dose of dilaudid and keeping long acting MS contin dose the same.  5/9 Patient stopped taking dilaudid yesterday with some improvement in mentation but today started having fever, headache and malaise.  UA shows trace LE and CXR concerning for infiltrate left lower lobe and increasing WBC.  Empiric rocephin/azitromycin started to cover both lung and urine possibilities.  Dr Aleman evaluated patient this am and has a pathology report at the office stating possible cholangiocarcinoma - the first of the three results on tissue taken 4/19 - clinically would make more sense for this presentation.  Once patient is stable for discharge, a PET scan and PORT placement will need coordination.  Patient worked with PT today and Four Wheeled Walker recommended for discharge - order placed.  5/10 Patient feeling better today, states she is not confused nor disoriented like before with the higher dose pain medication.  She is having slightly more pain in her left hip over the trochanteric bursa and no erythema seen nor warmth palpated.  Recommended ice to the area for pain control.  She remains afebrile since yesterday am.  She is still requiring 2 liters of oxygen and hoping she does not need this for discharge home - will continue with IS and antibiotics to see if she can wean from this.  5/11 Patient feeling okay, no significant improvement compared to yesterday, still on 2 liters, has been 2 days since last bowel movement and only able to tolerate small meals before she gets satiated.   She is very tender most of her abdomen and liver palpated in all 4 quadrants.  Will order KUB to evaluate for stool burden vs other for increased tenderness of her abdomen.  If this does not show significant stool burden then will check CT abdomen/pelvis with oral contrast.  Blood cultures collected given continued WBC elevation and abx changed to levaquin.   Patient without new complaint, states abdominal pain better today after 2 bowel movements yesterday.  Given persistent leukocytosis despite antibiotic change, CT abdomen/pelvis ordered with PO contrast only, no new findings, no fluid pockets to suggest abscess.  Still concern for pneumonia Left lung which is likely the source of her fever and continued leukocytosis, will continue Levaquin.    Consultants/Specialty  D/w Dr Fernandes  D/w Dr Aleman    Disposition  Home soon  Four wheeled walker delivered.          Review of Systems   Constitutional: Positive for fever and malaise/fatigue. Negative for chills.   HENT: Negative for congestion and sore throat.    Eyes: Negative for blurred vision and photophobia.   Respiratory: Negative for cough, sputum production and shortness of breath.    Cardiovascular: Negative for chest pain, claudication and leg swelling.   Gastrointestinal: Positive for abdominal pain (improved overal but tender to palp. better). Negative for constipation, diarrhea, heartburn, nausea and vomiting.   Genitourinary: Negative for dysuria and hematuria.   Musculoskeletal: Positive for myalgias (left hip pain - mild). Negative for joint pain.   Skin: Negative for itching and rash.   Neurological: Negative for dizziness, sensory change, speech change, weakness and headaches.   Psychiatric/Behavioral: Negative for depression. The patient is not nervous/anxious and does not have insomnia.       Physical Exam  Laboratory/Imaging   Hemodynamics  Temp (24hrs), Av.5 °C (97.7 °F), Min:36.3 °C (97.3 °F), Max:36.8 °C (98.2 °F)   Temperature: 36.3 °C  (97.3 °F)  Pulse  Av.8  Min: 75  Max: 108    Blood Pressure: 113/67      Respiratory      Respiration: 17, Pulse Oximetry: 92 %        RUL Breath Sounds: Clear, RML Breath Sounds: Diminished, RLL Breath Sounds: Diminished, JACQUELYN Breath Sounds: Clear, LLL Breath Sounds: Diminished    Fluids    Intake/Output Summary (Last 24 hours) at 18 1458  Last data filed at 18 0400   Gross per 24 hour   Intake             1400 ml   Output                0 ml   Net             1400 ml       Nutrition  Orders Placed This Encounter   Procedures   • Diet Order     Standing Status:   Standing     Number of Occurrences:   1     Order Specific Question:   Diet:     Answer:   Hepatic [9]     Physical Exam   Constitutional: She is oriented to person, place, and time. She appears well-developed and well-nourished. No distress.   HENT:   Head: Normocephalic and atraumatic.   Eyes: Conjunctivae are normal. No scleral icterus.   Neck: Neck supple. No JVD present.   Cardiovascular: Normal rate, regular rhythm and normal heart sounds.  Exam reveals no gallop and no friction rub.    No murmur heard.  Pulmonary/Chest: Effort normal and breath sounds normal. No respiratory distress. She has no wheezes. She has no rales. She exhibits no tenderness.   Abdominal: Soft. Bowel sounds are normal. She exhibits no distension. There is tenderness (global tenderness improveed). There is no guarding.   Musculoskeletal: She exhibits tenderness (right forearm induration consistent with phlebitis - improving, left trochanteric bursa - ttp). She exhibits no edema.   Neurological: She is alert and oriented to person, place, and time. No cranial nerve deficit.   Mentation normal.   Skin: Skin is warm and dry. She is not diaphoretic. No erythema. No pallor.   Psychiatric: She has a normal mood and affect. Her behavior is normal.   Nursing note and vitals reviewed.      Recent Labs      18   2355  18   0034  18   2352   WBC  15.2*   15.1*  15.5*   RBC  3.38*  3.40*  3.55*   HEMOGLOBIN  11.4*  11.3*  11.5*   HEMATOCRIT  34.3*  34.4*  36.4*   MCV  101.5*  101.2*  102.5*   MCH  33.7*  33.2*  32.4   MCHC  33.2*  32.8*  31.6*   RDW  48.7  48.5  49.9   PLATELETCT  194  211  256   MPV  10.2  10.0  10.3     Recent Labs      05/09/18   2355  05/11/18   0034  05/11/18   2352   SODIUM  132*  135  136   POTASSIUM  4.1  4.4  4.3   CHLORIDE  100  101  100   CO2  28  27  27   GLUCOSE  129*  122*  107*   BUN  10  8  9   CREATININE  0.50  0.53  0.53   CALCIUM  9.5  9.6  9.8                      Assessment/Plan     * Intractable abdominal pain- (present on admission)   Assessment & Plan    Due to liver mets; pain better controlled   MS Contin to 30mg bid and PRN MSIR as needed  Feeling distended and bloated today with poor po intake, KUB ordered some stool present, states pain is better following 2 bowel movements yesterday, given her persistent high WBC, ordered CT abdomen to make sure no infectious process in abdomen present - none found.  Liver appears same.  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   Less likely ovarian CA per Dr. Fernandes given the abscence of usual symptoms (carcinomatosis/ileus that would usually be present if ovarian had gone to liver)  Current tissue undergoing further stains - if still not diagnostic then patient   D/w Dr Aleman - suspicion for cholangiocarcinoma, will need PORT prior to chemotherapy initiation.  PET scan scheduled for May 21. 1230pm        Left hip pain   Assessment & Plan    Minimal pain now  Examination consistent with greater trochanteric bursitis  Ice for pain and inflammation control          Fever   Assessment & Plan    CXR showing left lower lung atx vs pneumonia - empiric omnicef/azithromycin changed to  levaquin for HCAP  Elevated WBC decreasing 15<--15 <-- 15 <-- 17  UA - Trace LE - will be covered by levaquin  Right arm with thrombophlebitis from previous IV site - improving.  Urine culture pending            HTN (hypertension)- (present on admission)   Assessment & Plan    Stable; cont current rx          Quality-Core Measures   Reviewed items::  Labs reviewed, Medications reviewed and Radiology images reviewed  Duarte catheter::  No Duarte  DVT prophylaxis pharmacological::  Heparin  Assessed for rehabilitation services:  Patient was assess for and/or received rehabilitation services during this hospitalization

## 2018-05-12 NOTE — PROGRESS NOTES
Son and daughter in law at bedside, lots of questions about plan for pt. Pt is frustrated that he feels like his mothers care is being passed over and that they would like to know more about plan of care of pt. Spent time with family and pt and educated and discussed care of patient including the use of HH and palliative team if needed.

## 2018-05-12 NOTE — CARE PLAN
Problem: Infection  Goal: Will remain free from infection  Outcome: PROGRESSING SLOWER THAN EXPECTED  ABX per MAR   Free from signs and symptoms of infection   PT and family educated on handwashing   All staff performing proper hand hygiene prior to PT contact    Problem: Bowel/Gastric:  Goal: Will not experience complications related to bowel motility  Outcome: PROGRESSING SLOWER THAN EXPECTED  Bowel protocol given per MAR   PT and family educated about diet, fluid intake and activity to promote bowel function   Bathroom opportunities scheduled and/or offered

## 2018-05-12 NOTE — PROGRESS NOTES
Assumed care of pt at 0645. Rc'd report from [facility]. Pt is Aox4. Pt is up standby with front wheel walker.  Denies nausea. Patient complains of pain, medicated per MAR. Hourly rounding in place. Call light within reach. Bed in lowest, locked position.

## 2018-05-12 NOTE — CARE PLAN
Problem: Safety  Goal: Will remain free from injury  Outcome: PROGRESSING AS EXPECTED  Pt is using her front wheel walker. Bed in lowest and locked position. Non-slip socks on, bed near the nursing station.     Problem: Psychosocial Needs:  Goal: Level of anxiety will decrease  Outcome: PROGRESSING AS EXPECTED  Educated pt on today's plan of care. Verbalizes she feels less anxiety. Hourly rounding in place.

## 2018-05-13 PROBLEM — J18.9 PNEUMONIA: Status: ACTIVE | Noted: 2018-01-01

## 2018-05-13 NOTE — CARE PLAN
Problem: Knowledge Deficit  Goal: Knowledge of disease process/condition, treatment plan, diagnostic tests, and medications will improve  Outcome: PROGRESSING SLOWER THAN EXPECTED  Continue to educate the pt on plan of care  Continue to educate pt on diagnostics and procedures
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
## 2018-05-13 NOTE — PROGRESS NOTES
Received report from day RN, assumed care of PT at 1900. PT A&Ox 4, sitting up in bed watching TV, pt is a bit concerned with having a port placed because she doesn't understand what that procedure looks like, provided educational materials and discussed port placement with pt. Further discussed plan of care for this shift.  Pain managed with meds per MAR, pt complaining of 10/10 stabbing pain, medicated appropriately. PT up SBA w/fww, safety precautions in place. Call light and personal possessions within reach.
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
## 2018-05-13 NOTE — PROGRESS NOTES
[facility] Hospitalist Progress Note    Date of Service: 5/13/2018    Chief Complaint  63 y.o. female admitted 5/4/2018 with uncontrolled RUQ pain, adenocarcinoma on biopsy of liver but there is confusion to primary source.    Interval Problem Update  5/8 Patient's pain is under good control with current regimen but she is having hypoxia while sleeping and poor mentation on waking - likely over sedated with current narcotic regimen.  I've reduced the dose of dilaudid and keeping long acting MS contin dose the same.  5/9 Patient stopped taking dilaudid yesterday with some improvement in mentation but today started having fever, headache and malaise.  UA shows trace LE and CXR concerning for infiltrate left lower lobe and increasing WBC.  Empiric rocephin/azitromycin started to cover both lung and urine possibilities.  Dr Aleman evaluated patient this am and has a pathology report at the office stating possible cholangiocarcinoma - the first of the three results on tissue taken 4/19 - clinically would make more sense for this presentation.  Once patient is stable for discharge, a PET scan and PORT placement will need coordination.  Patient worked with PT today and Four Wheeled Walker recommended for discharge - order placed.  5/10 Patient feeling better today, states she is not confused nor disoriented like before with the higher dose pain medication.  She is having slightly more pain in her left hip over the trochanteric bursa and no erythema seen nor warmth palpated.  Recommended ice to the area for pain control.  She remains afebrile since yesterday am.  She is still requiring 2 liters of oxygen and hoping she does not need this for discharge home - will continue with IS and antibiotics to see if she can wean from this.  5/11 Patient feeling okay, no significant improvement compared to yesterday, still on 2 liters, has been 2 days since last bowel movement and only able to tolerate small meals before she gets satiated.   She is very tender most of her abdomen and liver palpated in all 4 quadrants.  Will order KUB to evaluate for stool burden vs other for increased tenderness of her abdomen.  If this does not show significant stool burden then will check CT abdomen/pelvis with oral contrast.  Blood cultures collected given continued WBC elevation and abx changed to levaquin.  5/12 Patient without new complaint, states abdominal pain better today after 2 bowel movements yesterday.  Given persistent leukocytosis despite antibiotic change, CT abdomen/pelvis ordered with PO contrast only, no new findings, no fluid pockets to suggest abscess.  Still concern for pneumonia Left lung which is likely the source of her fever and continued leukocytosis, will continue Levaquin.  5/13 Patient feeling well today, she ambulated on the unit with her four wheel walker with good stability.  PORT to be placed tomorrow prior to discharge, she will be NPO at [facility] and heparin stopped.  She is currently off oxygen and saturating well and WBC finally dropped again.    Consultants/Specialty  D/w Dr Fernandes  D/w Dr Aleman    Disposition  Home soon  Four wheeled walker delivered.          Review of Systems   Constitutional: Negative for chills, fever and malaise/fatigue.   HENT: Negative for congestion and sore throat.    Eyes: Negative for blurred vision and photophobia.   Respiratory: Negative for cough, sputum production and shortness of breath.    Cardiovascular: Negative for chest pain, claudication and leg swelling.   Gastrointestinal: Positive for abdominal pain (improved overal but tender to palp. better). Negative for constipation, diarrhea, heartburn, nausea and vomiting.   Genitourinary: Negative for dysuria and hematuria.   Musculoskeletal: Positive for myalgias (left hip pain - mild). Negative for joint pain.   Skin: Negative for itching and rash.   Neurological: Negative for dizziness, sensory change, speech change, weakness and headaches.    Psychiatric/Behavioral: Negative for depression. The patient is not nervous/anxious and does not have insomnia.       Physical Exam  Laboratory/Imaging   Hemodynamics  Temp (24hrs), Av.4 °C (97.6 °F), Min:36.2 °C (97.1 °F), Max:37 °C (98.6 °F)   Temperature: 36.2 °C (97.1 °F)  Pulse  Av.6  Min: 75  Max: 109    Blood Pressure: 108/71      Respiratory      Respiration: 17, Pulse Oximetry: 94 %        RUL Breath Sounds: Clear, RML Breath Sounds: Diminished, RLL Breath Sounds: Diminished, JACQUELYN Breath Sounds: Clear, LLL Breath Sounds: Diminished    Fluids    Intake/Output Summary (Last 24 hours) at 18 1528  Last data filed at 18 2229   Gross per 24 hour   Intake              480 ml   Output                0 ml   Net              480 ml       Nutrition  Orders Placed This Encounter   Procedures   • Diet Order     Standing Status:   Standing     Number of Occurrences:   1     Order Specific Question:   Diet:     Answer:   Hepatic [9]   • DIET NPO     Standing Status:   Standing     Number of Occurrences:   8     Order Specific Question:   Restrict to:     Answer:   Sips with Medications [3]     Physical Exam   Constitutional: She is oriented to person, place, and time. She appears well-developed and well-nourished. No distress.   HENT:   Head: Normocephalic and atraumatic.   Eyes: Conjunctivae are normal. No scleral icterus.   Neck: Neck supple. No JVD present.   Cardiovascular: Normal rate, regular rhythm and normal heart sounds.  Exam reveals no gallop and no friction rub.    No murmur heard.  Pulmonary/Chest: Effort normal and breath sounds normal. No respiratory distress. She has no wheezes. She has no rales. She exhibits no tenderness.   Abdominal: Soft. Bowel sounds are normal. She exhibits no distension. There is tenderness (global tenderness improveed). There is no guarding.   Musculoskeletal: She exhibits tenderness (right forearm induration consistent with phlebitis - improving, left  trochanteric bursa - ttp). She exhibits no edema.   Neurological: She is alert and oriented to person, place, and time. No cranial nerve deficit.   Mentation normal.   Skin: Skin is warm and dry. She is not diaphoretic. No erythema. No pallor.   Psychiatric: She has a normal mood and affect. Her behavior is normal.   Nursing note and vitals reviewed.      Recent Labs      05/11/18   0034  05/11/18 2352 05/13/18   0027   WBC  15.1*  15.5*  13.0*   RBC  3.40*  3.55*  3.58*   HEMOGLOBIN  11.3*  11.5*  11.6*   HEMATOCRIT  34.4*  36.4*  36.6*   MCV  101.2*  102.5*  102.2*   MCH  33.2*  32.4  32.4   MCHC  32.8*  31.6*  31.7*   RDW  48.5  49.9  50.0   PLATELETCT  211  256  262   MPV  10.0  10.3  10.4     Recent Labs      05/11/18   0034  05/11/18 2352  05/13/18   0027   SODIUM  135  136  135   POTASSIUM  4.4  4.3  4.7   CHLORIDE  101  100  101   CO2  27  27  27   GLUCOSE  122*  107*  99   BUN  8  9  9   CREATININE  0.53  0.53  0.40*   CALCIUM  9.6  9.8  9.7                      Assessment/Plan     * Intractable abdominal pain- (present on admission)   Assessment & Plan    Due to liver mets; pain better controlled   MS Contin to 30mg bid and PRN MSIR as needed  Feeling distended and bloated today with poor po intake, KUB ordered some stool present, states pain is better following 2 bowel movements yesterday, given her persistent high WBC, ordered CT abdomen to make sure no infectious process in abdomen present - none found.  Liver appears same.  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   Less likely ovarian CA per Dr. Fernandes given the abscence of usual symptoms (carcinomatosis/ileus that would usually be present if ovarian had gone to liver)  Current tissue undergoing further stains - if still not diagnostic  then patient   D/w Dr Aleman - suspicion for cholangiocarcinoma, will need PORT prior to chemotherapy initiation.  PET scan scheduled for May 21. 1230pm        Pneumonia   Assessment & Plan    Left sided on imaging, no cough  Hypoxic prior but weaning from oxygen well  10 day course of treatment with levaquin planned          Left hip pain   Assessment & Plan    Minimal pain now  Examination consistent with greater trochanteric bursitis  Ice for pain and inflammation control          Fever   Assessment & Plan    CXR showing left lower lung atx vs pneumonia - empiric omnicef/azithromycin changed to levaquin for HCAP  Elevated WBC decreasing 13<--15<--15 <-- 15 <-- 17  UA - Trace LE - will be covered by levaquin  Right arm with thrombophlebitis from previous IV site - improving.  Urine culture pending            HTN (hypertension)- (present on admission)   Assessment & Plan    Stable; cont current rx          Quality-Core Measures   Reviewed items::  Labs reviewed, Medications reviewed and Radiology images reviewed  Duarte catheter::  No Duarte  DVT prophylaxis pharmacological::  Heparin  Assessed for rehabilitation services:  Patient was assess for and/or received rehabilitation services during this hospitalization

## 2018-05-13 NOTE — PROGRESS NOTES
Assumed care of pt at 0700. Rc'd report from [facility]. Pt is Aox4. Pt is up standby assist, front wheel walker.  Denies nausea. Complains of 4/10 pain, medicated per MAR.  Hourly rounding in place. Call light within reach. Bed in lowest, locked position.

## 2018-05-13 NOTE — CARE PLAN
Problem: Knowledge Deficit  Goal: Knowledge of the prescribed therapeutic regimen will improve  Outcome: PROGRESSING SLOWER THAN EXPECTED  Pt is educated about when she will be NPO. She was very anxious.     Problem: Psychosocial Needs:  Goal: Level of anxiety will decrease  Outcome: PROGRESSING SLOWER THAN EXPECTED  Pt is very anxious about things at home. Spent time today comforting her.

## 2018-05-13 NOTE — ASSESSMENT & PLAN NOTE
Left sided on imaging, no cough  Hypoxic prior but weaning from oxygen well  10 day course of treatment with levaquin planned

## 2018-05-14 NOTE — PROGRESS NOTES
Patient discharge paperwork discussed, all questions answered. Patient voices understanding of PET ct diet and when to start. Understands follow up appointments. Port flushed and heparinized. Awaiting ride for discharge.

## 2018-05-14 NOTE — DISCHARGE INSTRUCTIONS
Discharge Instructions    Discharged to home by car with relative. Discharged via wheelchair, hospital escort: Yes.  Special equipment needed: Not Applicable    Be sure to schedule a follow-up appointment with your primary care doctor or any specialists as instructed.     Discharge Plan:   Diet Plan: Discussed  Activity Level: Discussed  Confirmed Follow up Appointment: Patient to Call and Schedule Appointment  Confirmed Symptoms Management: Discussed  Pneumococcal Vaccine Administered/Refused: Not given - Patient refused pneumococcal vaccine  Influenza Vaccine Indication: Patient Refuses    I understand that a diet low in cholesterol, fat, and sodium is recommended for good health. Unless I have been given specific instructions below for another diet, I accept this instruction as my diet prescription.   Other diet: Regular     Special Instructions: None    · Is patient discharged on Warfarin / Coumadin?   No     Depression / Suicide Risk    As you are discharged from this [facility] facility, it is important to learn how to keep safe from harming yourself.    Recognize the warning signs:  · Abrupt changes in personality, positive or negative- including increase in energy   · Giving away possessions  · Change in eating patterns- significant weight changes-  positive or negative  · Change in sleeping patterns- unable to sleep or sleeping all the time   · Unwillingness or inability to communicate  · Depression  · Unusual sadness, discouragement and loneliness  · Talk of wanting to die  · Neglect of personal appearance   · Rebelliousness- reckless behavior  · Withdrawal from people/activities they love  · Confusion- inability to concentrate     If you or a loved one observes any of these behaviors or has concerns about self-harm, here's what you can do:  · Talk about it- your feelings and reasons for harming yourself  · Remove any means that you might use to hurt yourself (examples: pills, rope, extension cords,  firearm)  · Get professional help from the community (Mental Health, Substance Abuse, psychological counseling)  · Do not be alone:Call your Safe Contact- someone whom you trust who will be there for you.  · Call your local CRISIS HOTLINE 223-8022 or 770-053-0681  · Call your local Children's Mobile Crisis Response Team [place] (126) 958-8002 or www.Springbot  · Call the toll free National Suicide Prevention Hotlines   · National Suicide Prevention Lifeline 129-457-ESHD (7934)  · National Hope Line Network 800-SUICIDE (945-4700)

## 2018-05-14 NOTE — PROGRESS NOTES
IR Procedure Note:    Site Marked and Confirmed with MD, patient and RN pre procedure. Dr. Rowland placed a tunneled port.  The pt tolerated the procedure well; ETCo2 baseline 14, with consistent waveform during the procedure.  Sutures, gauze, tegaderm, steristrips applied to right chest, CDI and soft.  Pt alert and verbally appropriate post procedure, vital signs stable during procedure and transport, see flow sheet for vital signs.  Report given to Jeremie LAST.  RN transported pt to R306.      BARD Power Port.  REF: [id number]. LOT: [id number]

## 2018-05-14 NOTE — OR SURGEON
Immediate Post- Operative Note    PostOp Diagnosis: VENOUS ACCESS REQUIRED FOR CHEMOTHERAPY. LIVER METS.      Procedure(s): RIGHT INTERNAL JUGULAR POWER PORT VENOUS ACCESS PLACEMENT WITH U/S AND FLUOROSCOPIC GUIDANCE      Estimated Blood Loss: <5CC        Complications: NONE          5/14/2018     9:51 AM     Fredi Rowland

## 2018-05-14 NOTE — PROGRESS NOTES
Received report from day RN, assumed care of PT at 1900. PT A&Ox 4, is now up self w/FWW, but, will call and let us know that she is moving around her room, PIV placed for PORT placement tomorrow, had pt teach back port education from yesterday, pt has good understanding of the procedure to occur,  discussed plan of care for this shift.  Pain managed with meds per MAR, no unmet pain needs at this time. Hourly rounding in place, safety precautions in place. Call light and personal possessions within reach.

## 2018-05-14 NOTE — CARE PLAN
Problem: Safety  Goal: Will remain free from injury  Outcome: PROGRESSING AS EXPECTED  Educated PT on use of call light   PT oriented to room, all possessions within reach, call light within reach   Slip resistant socks on PT (yellow if fall risk)        Problem: Infection  Goal: Will remain free from infection  Outcome: PROGRESSING AS EXPECTED  Free from signs and symptoms of infection   PT and family educated on handwashing   All staff performing proper hand hygiene prior to PT contact

## 2018-05-15 NOTE — DISCHARGE SUMMARY
CHIEF COMPLAINT ON ADMISSION  RUQ pain and nausea    CODE STATUS  DNR    HPI & HOSPITAL COURSE  This is a 63 y.o. female who initially presented to [facility] for uncontrolled abdominal pain with nausea and vomiting despite her zofran and oxycodone at home.  She had liver biopsy that showed possible metastatic ovarian cancer and had followed up with Dr Fernandes who ordered a CT abdomen/pelvis.  Patient transferred to [facility] for oncology consultation.  Discussed with Dr Fernandes and her presentation was not consistent with ovarian cancer as the omental caking and ascites were absent.  Earlier pathology report had also mentioned cholangiocarcinoma which fits patient's presentation.  Dr Aleman consulted and recommended PORT placement for planning for initiation of chemotherapy.   Patient's pain was managed with adjustement of medications and she is doing well on combination of MS Contin and MSIR.  Because of the pain she was experiencing she was not taking deep breaths and did develop a pneumonia on hospitalization.  Treatment for HCAP was started and she is feeling much better, weaned from oxygen and WBC trending back down.  She will complete course of Levaquin, follow up with PET scan on 5/21 and with Dr Aleman on 5/23.  Four wheel walker provided for discharge, patient currently does not want home health assistance but does know it is available to her.    The patient met 2-midnight criteria for an inpatient stay at the time of discharge.    Therefore, she is discharged in good and stable condition with close outpatient follow-up.    SPECIFIC OUTPATIENT FOLLOW-UP  Dr Alemna 5/23  PCP as needed    DISCHARGE PROBLEM LIST  Principal Problem:    Intractable abdominal pain POA: Yes  Active Problems:    Liver metastasis (HCC) POA: Yes    HTN (hypertension) POA: Yes    Fever POA: Unknown    Left hip pain POA: Unknown    Pneumonia POA: Unknown  Resolved Problems:    Intractable nausea and vomiting POA: Yes    Hyponatremia POA: Yes      FOLLOW  UP  Future Appointments  Date Time Provider Department Center   5/21/2018 12:30 PM [address] 1 W75K [address] Goodwin M.D.  [address]  [address]  [facility] [address]  [phone]    Go on 5/18/2018  Please arrive at 9 am for your appointment. Thank you     Raj Aleman M.D.  [address] [facility] [id number] [phone]    On 5/23/2018  10:45 am       MEDICATIONS ON DISCHARGE   Tonya Collins   Home Medication Instructions [id number]    Printed on:05/14/18 2057   Medication Information                      buPROPion SR (WELLBUTRIN-SR) 150 MG TABLET SR 12 HR sustained-release tablet  Take 150 mg by mouth 2 times a day.             gabapentin (NEURONTIN) 300 MG Cap  Take 300 mg by mouth 3 times a day.             levoFLOXacin (LEVAQUIN) 750 MG tablet  Take 1 Tab by mouth every day for 5 days.             morphine (MS IR) 15 MG tablet  Take 0.5-1 Tabs by mouth every four hours as needed for up to 30 days.             morphine ER (MS CONTIN) 30 MG Tab CR tablet  Take 1 Tab by mouth every 12 hours for 30 days.             ondansetron (ZOFRAN ODT) 4 MG TABLET DISPERSIBLE  Take 1 Tab by mouth every 6 hours as needed for Nausea.                 DIET  No orders of the defined types were placed in this encounter.      ACTIVITY  As tolerated.  Weight bearing as tolerated      CONSULTATIONS  Dr Aleman - oncology    PROCEDURES  PORT [facility] 5/14/18 - Dr Rowland    LABORATORY  Lab Results   Component Value Date/Time    SODIUM 133 (L) 05/13/2018 11:55 PM    POTASSIUM 4.3 05/13/2018 11:55 PM    CHLORIDE 102 05/13/2018 11:55 PM    CO2 26 05/13/2018 11:55 PM    GLUCOSE 96 05/13/2018 11:55 PM    BUN 10 05/13/2018 11:55 PM    CREATININE 0.62 05/13/2018 11:55 PM        Lab Results   Component Value Date/Time    WBC 13.3 (H) 05/13/2018 11:55 PM    HEMOGLOBIN 11.8 (L) 05/13/2018 11:55 PM    HEMATOCRIT 36.1 (L) 05/13/2018 11:55 PM    PLATELETCT 224 05/13/2018 11:55 PM        Total time of the  discharge process exceeds 38 minutes

## 2018-05-19 NOTE — DOCUMENTATION QUERY
DOCUMENTATION QUERY    PROVIDERS: Please select “Cosign w/ note”to reply to query.    To better represent the severity of illness of your patient, please review the following information and exercise your independent professional judgment in responding to this query.     Cholangiocarcinoma is documented in the Discharge Summary. Based upon the clinical findings, risk factors, and treatment, was this ruled in or ruled out?      • Cholangiocarcinoma has been ruled in  • Cholangiocarcinoma has been ruled out  • Other explanation of clinical findings  (Please explain)  • Unable to determine          The medical record reflects the following:   Clinical Findings  presented to [facility] for uncontrolled abdominal pain with nausea and vomiting despite her zofran and oxycodone at home.  She had liver biopsy that showed possible metastatic ovarian cancer and had followed up with Dr Fernandes who ordered a CT abdomen/pelvis.  Patient transferred to [facility] for oncology consultation.  Discussed with Dr Fernandes and her presentation was not consistent with ovarian cancer as the omental caking and ascites were absent.  Earlier pathology report had also mentioned cholangiocarcinoma which fits patient's presentation.    Discharge summary list liver metastasis  Port placed for chemotherapy   Treatment  Labs  Port placed  Levaquin     Risk Factors  Pneumonia  Hypoxia  Intractable pain  Hysterectomy at age of 23   Location within medical record  Discharge Summary     Thank you,   Sara Cortes LPN,[facility]  Inpatient   [email or web address]

## 2018-06-23 PROBLEM — G93.40 ACUTE ENCEPHALOPATHY: Status: ACTIVE | Noted: 2018-01-01

## 2018-06-23 PROBLEM — T45.1X5A ANEMIA ASSOCIATED WITH CHEMOTHERAPY: Status: ACTIVE | Noted: 2018-01-01

## 2018-06-23 PROBLEM — E87.1 HYPONATREMIA: Status: ACTIVE | Noted: 2018-01-01

## 2018-06-23 PROBLEM — D64.81 ANEMIA ASSOCIATED WITH CHEMOTHERAPY: Status: ACTIVE | Noted: 2018-01-01

## 2018-06-23 PROBLEM — N30.00 ACUTE CYSTITIS WITHOUT HEMATURIA: Status: ACTIVE | Noted: 2018-01-01

## 2018-06-23 NOTE — ED PROVIDER NOTES
CHIEF COMPLAINT  Chief Complaint   Patient presents with   • ALOC     Per pt's family, pt's family is concerned pt. has become so dehydrated she has become confused she is hallucinating. Pt. is A&O x 3 confused only to time.   • GLF     Per pt's family pt. has had multipl GLFs. Per report from family pt. did hit back of head but did not have any LOC. Pt. has small superficial lacs on arms bilaterally.       HPI  Tonya Collins is a 63 y.o. female with a history of metastatic cancer, thought to be secondary to cholangiocarcinoma (oncologist is Dr Aleman), currently undergoing chemotherapy, who presents with decreased appetite, confusion, ground-level fall. No recent fevers. Daughter at bedside notes that the patient has been slightly confused for the past few days and appears to be having generalized weakness. Family notes that the patient did fall in the home earlier today and they're concerned that she struck her head as well.    The patient is not fully oriented. She cannot give the right date/month and cannot give her birth date.    Last admission was in May 2018 for abdominal pain symptoms. She is DNR status.    REVIEW OF SYSTEMS  See HPI for further details. Limited secondary to the patient's mental status..     PAST MEDICAL HISTORY   has a past medical history of Arthritis; Back pain; Cancer (HCC); Gynecological disorder; Indigestion; Pneumonia; and Psychiatric problem.    SOCIAL HISTORY  Social History     Social History Main Topics   • Smoking status: Former Smoker     Packs/day: 0.50     Years: 48.00     Start date: 1/1/1970     Quit date: 4/11/2018   • Smokeless tobacco: Never Used   • Alcohol use No   • Drug use: Yes      Comment: past marijuana use   • Sexual activity: Not on file       SURGICAL HISTORY   has a past surgical history that includes gyn surgery (1980); other; eye surgery (Left, 1968); appendectomy (1984); gastroscopy (4/15/2018); and colonoscopy (4/15/2018).    CURRENT  "MEDICATIONS  Home Medications    **Home medications have not yet been reviewed for this encounter**         ALLERGIES  Allergies   Allergen Reactions   • Iodine Anaphylaxis   • Shellfish Allergy Anaphylaxis       PHYSICAL EXAM  VITAL SIGNS: /45   Pulse (!) 114   Temp 36.6 °C (97.9 °F)   Resp 18   Ht 1.727 m (5' 8\")   Wt 55.3 kg (122 lb)   SpO2 91%   BMI 18.55 kg/m²   Pulse ox interpretation: I interpret this pulse ox as normal.  Constitutional: Alert, not fully oriented  HENT: No signs of trauma, Bilateral external ears normal, Nose normal. No signs of basilar skull fracture.  Eyes: Pupils are equal and reactive, Conjunctiva normal, Non-icteric.   Neck: Normal range of motion, No tenderness, Supple, No stridor.   Cardiovascular: Regular rate and rhythm, no murmurs.   Thorax & Lungs: Normal breath sounds, No respiratory distress, No wheezing, No chest tenderness.   Abdomen: Bowel sounds normal, Soft, No tenderness, No masses, No pulsatile masses. No peritoneal signs.  Skin: Warm, Dry, No erythema, No rash.   Back: No bony tenderness, No CVA tenderness.   Extremities: Intact distal pulses, No edema, No tenderness, No cyanosis  Musculoskeletal: Good range of motion in all major joints. No tenderness to palpation or major deformities noted. No hip tenderness  Neurologic: Alert, confused, Normal motor function, Normal sensory function, no facial droop, cranial nerves II through XII grossly intact, No focal deficits noted.       DIAGNOSTIC STUDIES / PROCEDURES      LABS  Labs Reviewed   CBC WITH DIFFERENTIAL - Abnormal; Notable for the following:        Result Value    RBC 3.50 (*)     Hemoglobin 11.3 (*)     Hematocrit 35.0 (*)     .0 (*)     MCHC 32.3 (*)     RDW 61.1 (*)     Lymphocytes 10.60 (*)     Monocytes 16.40 (*)     Immature Granulocytes 1.10 (*)     Lymphs (Absolute) 0.98 (*)     Monos (Absolute) 1.52 (*)     All other components within normal limits   COMP METABOLIC PANEL - Abnormal; " Notable for the following:     Sodium 133 (*)     Glucose 118 (*)     AST(SGOT) 75 (*)     Alkaline Phosphatase 289 (*)     All other components within normal limits   URINALYSIS - Abnormal; Notable for the following:     Character Cloudy (*)     Ketones Trace (*)     Protein 100 (*)     Bilirubin Moderate (*)     Leukocyte Esterase Moderate (*)     All other components within normal limits   URINE MICROSCOPIC (W/UA) - Abnormal; Notable for the following:     WBC 10-20 (*)     Bacteria Few (*)     Hyaline Cast 11-20 (*)     All other components within normal limits   BLOOD CULTURE    Narrative:     1 of 2 for Blood Culture x 2 sites order   BLOOD CULTURE    Narrative:     2 of 2 blood culture x2  Sites order   LACTIC ACID   ESTIMATED GFR   URINE CULTURE(NEW)    Narrative:     Indication for culture:->Emergency Room Patient       RADIOLOGY  CT-HEAD W/O   Final Result         1. No acute intracranial abnormality. No evidence of acute intracranial hemorrhage or mass lesion.               DX-CHEST-PORTABLE (1 VIEW)   Final Result         1. No acute cardiopulmonary abnormalities are identified.            COURSE & MEDICAL DECISION MAKING  Pertinent Labs & Imaging studies reviewed. (See chart for details)  63 y.o. female with a history of advanced metastatic disease presenting with confusion, falls, decreased appetite and oral intake. Currently undergoing chemotherapy. Has a right-sided chest port. No recent cough. No fevers. No bloody stool or black stool. No active vomiting here.    Patient's daughter at bedside notes that she had similar symptoms recently and was given IV fluid hydration with improvement. Also has had recent MRI in the past month that I was able to review which was unremarkable. I also reviewed the patient's prior medical record from her admission in the past month for abdominal pain symptoms.    Laboratory studies were performed. No evidence of leukocytosis. Anemia is consistent with prior studies.  No metabolic acidosis. No lactic acidosis.  Laboratory studies were largely unchanged from prior approximately one month ago.    There was delay in obtaining urine sample. Patient was given IV fluid hydration without significant urine output. Patient does have dry mucous membranes and history of poor oral intake. Likely dehydrated. Was given further IV fluid hydration. She was eventually able to give a urine sample. She was refusing a straight catheterization prior to this. Was able to obtain a small urine sample that was concerning for possible urinary tract infection. Ceftriaxone was ordered for this patient.    Given the patient's falls with reports of head trauma and confusion, CT of the head was performed. Found to be unremarkable. No signs of intracranial injury. Did review the patient's prior MRI from the past month and there were no acute abnormalities.    Patient does not appear to be septic at this time. No leukocytosis or tachycardia. Normal blood pressure. No evidence of pneumonia. Concerns for possible urinary tract infection according to urinalysis. Pending urine culture.    Given the patient's constellation of symptoms including confusion, generalized weakness and poor oral intake, concerns for dehydration, evidence of a urinary tract infection, recommending admission for further observation. The patient is agreeable with the plan.    Spoke with the hospitalist at 7:12 AM. Will see the patient for admission. The patient will be admitted in guarded condition.      FINAL IMPRESSION  1. Confusion    2. Urinary tract infection without hematuria, site unspecified    3. Fall, initial encounter    4. Weakness            Electronically signed by: Werner Wei, 6/23/2018 1:51 AM

## 2018-06-23 NOTE — ED NOTES
Rounded on pt, answered questions, addressed needs, ensured call light within reach. Provided emotional support for pt. Discussed admit, and wait status and ordered diet.

## 2018-06-23 NOTE — PROGRESS NOTES
Noted a small tear on left forearm. Noted a healing scab on upper middle back. All other pressure points were red and blanching.

## 2018-06-23 NOTE — ED NOTES
Rounded on pt, answered questions, addressed needs, ensured call light within reach. Provided emotional support for pt. Pt assisted to restroom and back to bed.

## 2018-06-23 NOTE — ED TRIAGE NOTES
"Tonya Collins  63 y.o. female  Chief Complaint   Patient presents with   • ALOC     Per pt's family, pt's family is concerned pt. has become so dehydrated she has become confused she is hallucinating. Pt. is A&O x 3 confused only to time.   • GLF     Per pt's family pt. has had multipl GLFs. Per report from family pt. did hit back of head but did not have any LOC. Pt. has small superficial lacs on arms bilaterally.     /45   Pulse (!) 114   Temp 36.6 °C (97.9 °F)   Resp 18   Ht 1.727 m (5' 8\")   Wt 55.3 kg (122 lb)   SpO2 91%   BMI 18.55 kg/m²     Pt amb to triage via wheelchair. Pt's last chemotherapy treatment was two weeks ago. Pt. Has not recently had a fever or been told she was neutropenic. Charge RN notified. Pt. Placed in senior lounge. Pt. Wearing mask for protection.  Pt is alert and oriented, speaking in full sentences, follows commands and responds appropriately to questions. NAD. Resp are even and unlabored.  Pt placed in senior lounge. Pt educated on triage process. Pt encouraged to alert staff for any changes.  "

## 2018-06-23 NOTE — CARE PLAN
Problem: Communication  Goal: The ability to communicate needs accurately and effectively will improve  Outcome: PROGRESSING AS EXPECTED  Discussed POC with pt. All questions answered.    Problem: Safety  Goal: Will remain free from injury  Outcome: PROGRESSING AS EXPECTED  Pt's room is near nurses station. Bed in lowest and locked position. Bed alarm in place. Fall risk education provided.

## 2018-06-23 NOTE — H&P
PRIMARY CARE PROVIDER:  Tg Goodwin MD    PRIMARY ONCOLOGIST:  Dr. Aleman.    CHIEF COMPLAINT:  Generalized malaise and weakness.    HISTORY OF PRESENT ILLNESS:  The patient is a 63-year-old female with a   history of suspected metastatic cholangiocarcinoma, being treated with   chemotherapy under the care of Dr. Aleman.  Last chemotherapy was about 10 days   ago according to her daughter.  Over the past 3 days, the patient has been   having poor intake with nausea, intermittent vomiting and generalized malaise   and fatigue.  She has had multiple falls over the past few days.  No diarrhea.    No melena or rectal bleeding.  No hemetemesis or coffee-ground emesis.  She   has chronic abdominal pain secondary to her cancer which has been at baseline.    She has been having intermittent confusion.  No dysuria.  She has had some   urgency.  No documented fever.  No chills.    According to the daughter, the patient had similar presentation with previous   episodes of dehydration.    REVIEW OF SYSTEMS:  As above.  Otherwise, all other systems reviewed and   negative.    PAST MEDICAL HISTORY:  Significant for peripheral neuropathy, chronic back   pain, degenerative joint disease, cholangiocarcinoma.    PAST SURGICAL HISTORY:  Eye surgery, appendectomy, gastroscopy, colonoscopy,   GYN surgery.    SOCIAL HISTORY:  She is an ex-smoker, no alcohol or illicit drug use.    FAMILY HISTORY:  Reviewed, not pertinent to the presenting problem.    HOME MEDICATIONS:  Acyclovir 200 mg b.i.d., Wellbutrin 75 mg b.i.d., Colace   100 mg b.i.d., gabapentin 300 mg 3 times a day, MS Contin 30 mg every 12   hours, Zofran as needed, oxycodone 10 mg b.i.d. as needed, Phenergan as   needed.    PHYSICAL EXAMINATION:  GENERAL:  The patient is alert.  She is oriented to self, place, and year.  VITAL SIGNS:  Temperature is 36.6, pulse is 106, respiratory rate is 18, blood   pressure 113/45 and pulse oximetry is 92%.  HEAD AND NECK:  Pupils equal.   Supple neck.  No jugular venous distention.    Oropharynx is clear.  No cervical lymphadenopathy.  HEART:  Regular rate and rhythm, normal S1, S2.  No murmurs, rubs or gallops.  LUNGS:  Clear with symmetric air entry bilaterally.  ABDOMEN:  Soft.  She has mild generalized tenderness.  No guarding or rebound.    Bowel sounds are positive.  No hepatosplenomegaly.  EXTREMITIES:  No edema, no clubbing, no cyanosis.  NEUROLOGIC:  No focal deficits.  SKIN:  No rash.    DIAGNOSTICS:  White blood cell count is 9.3, hemoglobin 11.3, hematocrit 35,   platelet count 415 and 70% neutrophils.  Sodium is 133, potassium 3.7,   chloride 99, bicarbonate 24, glucose 118, BUN 13 and creatinine 0.84, calcium   is 9.8, AST 75, ALT 28, alkaline phosphatase 289, total bilirubin 1.9, lactic   acid 1.9.  Urinalysis reveals moderate leukocyte esterase, 10-20 white blood   cells, few bacteria were seen.  CT of the head without contrast reveals no   acute intracranial abnormality, no evidence of acute intracranial hemorrhage   or mass lesion.  Chest x-ray reveals no acute cardiopulmonary abnormalities   are identified.  The patient had an MRI of the brain on 06/04/2018 which   revealed no evidence of intracranial metastases, mild cerebral atrophy, mild   chronic microvascular ischemic disease.  There has been no significant   interval change.    ASSESSMENT AND PLAN:  1.  Acute encephalopathy, likely related to her urinary tract infection and   dehydration.  2.  Urinary tract infection.  3.  Dehydration.  4.  Metastatic cholangiocarcinoma.  5.  Peripheral neuropathy.  6.  Hyponatremia secondary to dehydration.  7.  Anemia likely secondary to recent chemotherapy.    PLAN:  The patient will be admitted.  She will be started on IV fluids for   hydration.  She will be started on IV ceftriaxone and follow up on her urine   culture results and deescalate antibiotics accordingly.    We will continue with pain management with her gabapentin, MS Contin  and   p.r.n. oxycodone.  According to the patient and her daughter, she has been   stable on those dosages of pain medications.    She will be started on Lovenox for DVT prophylaxis.    We will continue with antiemetics as needed.    Code status discussed.  Her wishes are to be DNR/DNI.    The patient will likely require more than 2 midnights stay for treatment of   her medical condition.    If her symptoms do not improve with hydration and antibiotic treatment, we   will consider further workup.       ____________________________________     RJ DE LEÓN MD    GB / NTS    DD:  06/23/2018 08:48:47  DT:  06/23/2018 09:14:19    D#:  [id number]  Job#:  [id number]    cc: JO JACOME MD

## 2018-06-23 NOTE — ED NOTES
The Medication Reconciliation process has been completed by interviewing the patient and family who reports that she had chemo last on 6/12.    Allergies have been reviewed      Home Pharmacy:  [facility] [address]

## 2018-06-23 NOTE — ED NOTES
Pt refused straight cath, pt assisted to wheelchair and to bathroom and back to room. Pt only able to give small amount of urine. Verbal for 2nd liter of NS

## 2018-06-23 NOTE — PROGRESS NOTES
"Pt was transferred to floor at 1000 by transport, by preston. Pt is A/Ox3, time. Pt is up to commode with 2 assist. Bed alarm in place. Bed close to nursing station, in lowest and locked position.  Yellow socks in place. R PIV 20g NS 100mL/hr.  Call light within reach. Rounding in place.     /45   Pulse 92   Temp 36.6 °C (97.9 °F)   Resp 18   Ht 1.727 m (5' 8\")   Wt 58.9 kg (129 lb 13.6 oz)   SpO2 91%   BMI 19.74 kg/m²     "

## 2018-06-24 PROBLEM — C22.1 CHOLANGIOCARCINOMA METASTATIC TO LIVER (HCC): Status: ACTIVE | Noted: 2018-01-01

## 2018-06-24 PROBLEM — E87.6 HYPOKALEMIA: Status: ACTIVE | Noted: 2018-01-01

## 2018-06-24 NOTE — PROGRESS NOTES
Assumed care of pt at 1845. Received report from BERHANE Nicole. Pt A/O x 4, sitting up in bed watching TV.  PIV in place - NS running at 100 mL/hr. Discussed POC with pt at bedside. No complaints of pain or nausea. Pt up 1-2 assist to bedside commode. Bed in lowest, locked position. Call light and personal belongings within reach. Pt has no further questions or concerns at this time. Hourly rounding in place.

## 2018-06-24 NOTE — PROGRESS NOTES
[facility]    Date of Service: 2018    Chief Complaint  63 y.o. female admitted 2018 with altered mental status with hallucinations, dehydration and falls.  Recently started chemotherapy for cholangiocarcinoma with Dr Aleman.    Interval Problem Update  Patient feeling better but still having hallucinations and confusion.  She does remember me from previous admission and confides that she thought she was dying and has going to be crazy the remainder of her life.  Support given that her confusion was due to the urine infection and should continue to improve now with treatment.  She states that gives her hope.  She thinks she is supposed to have appointment with Dr Aleman tomorrow - I will let him know about her admission.    Consultants/Specialty  none    Disposition  Home when appropriate.        Review of Systems   Constitutional: Positive for malaise/fatigue. Negative for chills and fever.   HENT: Negative for congestion and sore throat.    Eyes: Negative for blurred vision and photophobia.   Respiratory: Negative for cough and shortness of breath.    Cardiovascular: Negative for chest pain, claudication and leg swelling.   Gastrointestinal: Positive for abdominal pain (chronic). Negative for constipation, diarrhea, heartburn, nausea and vomiting.   Genitourinary: Negative for dysuria and hematuria.   Musculoskeletal: Negative for joint pain and myalgias.   Skin: Negative for itching and rash.   Neurological: Positive for weakness. Negative for dizziness, sensory change, speech change and headaches.   Psychiatric/Behavioral: Positive for hallucinations. Negative for depression. The patient is nervous/anxious. The patient does not have insomnia.       Physical Exam  Laboratory/Imaging   Hemodynamics  Temp (24hrs), Av.7 °C (98 °F), Min:36.1 °C (97 °F), Max:37.3 °C (99.2 °F)   Temperature: 36.2 °C (97.2 °F)  Pulse  Av.7  Min: 86  Max: 114    Blood Pressure: 110/52       Respiratory      Respiration: 16, Pulse Oximetry: 91 %             Fluids    Intake/Output Summary (Last 24 hours) at 06/24/18 1547  Last data filed at 06/24/18 0551   Gross per 24 hour   Intake             2000 ml   Output              150 ml   Net             1850 ml       Nutrition  Orders Placed This Encounter   Procedures   • Diet Order Regular     Standing Status:   Standing     Number of Occurrences:   1     Order Specific Question:   Diet:     Answer:   Regular [1]     Physical Exam   Constitutional: She is oriented to person, place, and time. She appears well-developed and well-nourished. No distress.   HENT:   Head: Normocephalic and atraumatic.   Eyes: Conjunctivae are normal. No scleral icterus.   Neck: Neck supple. No JVD present.   Cardiovascular: Normal rate, regular rhythm and normal heart sounds.  Exam reveals no gallop and no friction rub.    No murmur heard.  Pulmonary/Chest: Effort normal and breath sounds normal. No respiratory distress. She exhibits no tenderness.   PORT right chest     Abdominal: Soft. Bowel sounds are normal. She exhibits no distension. There is no guarding.   Musculoskeletal: She exhibits no edema or tenderness.   Lymphadenopathy:     She has no cervical adenopathy.   Neurological: She is alert and oriented to person, place, and time. No cranial nerve deficit.   Skin: Skin is warm and dry. She is not diaphoretic. No erythema. No pallor.   Psychiatric: She has a normal mood and affect. Her behavior is normal.   Nursing note and vitals reviewed.      Recent Labs      06/23/18   0204  06/24/18   1014   WBC  9.3  11.5*   RBC  3.50*  2.94*   HEMOGLOBIN  11.3*  9.6*   HEMATOCRIT  35.0*  30.8*   MCV  100.0*  104.8*   MCH  32.3  32.7   MCHC  32.3*  31.2*   RDW  61.1*  65.3*   PLATELETCT  415  476*   MPV  10.2  9.8     Recent Labs      06/23/18   0204  06/24/18   1014   SODIUM  133*  134*   POTASSIUM  3.7  3.4*   CHLORIDE  99  104   CO2  24  22   GLUCOSE  118*  90   BUN  13  7*    CREATININE  0.84  0.47*   CALCIUM  9.8  8.3*                      Assessment/Plan     * Acute encephalopathy   Assessment & Plan    Suspect due to UTI  Continue to orient patient, avoid sedatives as able.          Acute cystitis without hematuria   Assessment & Plan    Likely the primary source of encephalopathy  Rocephin  Urine culture pending          Cholangiocarcinoma metastatic to liver (HCC)- (present on admission)   Assessment & Plan    Started chemotherapy with Dr Coe 2 weeks ago, thinks she is supposed to return to clinic tomorrow - will notify dr coe of patient's admission.          Hypokalemia   Assessment & Plan    Add Kcl to IVF          Anemia associated with chemotherapy   Assessment & Plan    HGB dropped with hydration  Chemotherapy/cancer induced  No signs of bleeding  Macrocytic - check b12/folate            Hyponatremia   Assessment & Plan    Mild, should improve with IVF         Dehydration- (present on admission)   Assessment & Plan    Continue with IVF          Depression- (present on admission)   Assessment & Plan    Continue wellbutrin          Neuropathy (HCC)- (present on admission)   Assessment & Plan    Continue neurontin            Quality-Core Measures   Reviewed items::  Labs reviewed, Medications reviewed and Radiology images reviewed  Duarte catheter::  No Duarte  DVT prophylaxis - mechanical:  SCDs  Antibiotics:  Treating active infection/contamination beyond 24 hours perioperative coverage

## 2018-06-24 NOTE — CARE PLAN
Problem: Communication  Goal: The ability to communicate needs accurately and effectively will improve  Outcome: PROGRESSING AS EXPECTED  Discussed POC for night with pt at bedside. Encouraged pt to call for assistance when needed and to voice feelings and any questions.     Problem: Safety  Goal: Will remain free from falls  Outcome: PROGRESSING AS EXPECTED  Bed in lowest position. Call light within reach. Pt verbalized/demonstrated how to reach staff when needed. Pt belongings within reach. Bed alarm on.     Problem: Pain Management  Goal: Pain level will decrease to patient's comfort goal  Outcome: PROGRESSING AS EXPECTED

## 2018-06-24 NOTE — ASSESSMENT & PLAN NOTE
Possibly primary source of encephalopathy? With continued leukocytosis concern for ongoing infection from some other source.  Completed 6 days of levaquin, urine cx neg.

## 2018-06-24 NOTE — PROGRESS NOTES
Aox3, disoriented to time. Pt is complaining of hallucinations. Port accessed with + blood return. Pt is up x1 to BSC.  Bed alarm in place.

## 2018-06-24 NOTE — ASSESSMENT & PLAN NOTE
Started chemotherapy with Dr Alemna 3 weeks prior to admit.  Will need to f/u with Dr Aleman when d/c and acute issues controlled.

## 2018-06-25 NOTE — DISCHARGE PLANNING
"Anticipated Discharge Disposition: Home with continued support of sweta and MERRY.  Pt denies discharge planning needs at this time.     Action: Met with pt at bedside.  Conversing appropriately regarding discharge assessment questions.   Pt reports living with son and DIL, uses a 4WW when ambulating.  Pt sts, 'I feel so much better than last night.'    Education provided r/t [facility], pt denies this, sts,\"It would just add too much chaos, to a crazy house.  There are neighbor kids, and and constant chaos.\"   Pt denies needing additional support, last visit SW provided resources for [facility].   S/w Dr. Melara regarding PT/OT will order when condition warrants.  Will continue to assess for discharge planning needs.      Barriers to Discharge: none identified.     Plan: Home with continued support of son and DIL, f/u with primary oncologist Dr. Aleman and JESSICA Song Rn.     Care Transition Team Assessment    Information Source  Orientation : Unable to Assess  Information Given By: Patient  Who is responsible for making decisions for patient? : Patient    Readmission Evaluation  Is this a readmission?: No    Elopement Risk  Legal Hold: No  Ambulatory or Self Mobile in Wheelchair: Yes  Disoriented: No  Psychiatric Symptoms: None  History of Wandering: No  Elopement this Admit: No  Vocalizing Wanting to Leave: No  Displays Behaviors, Body Language Wanting to Leave: No-Not at Risk for Elopement  Elopement Risk: Not at Risk for Elopement    Interdisciplinary Discharge Planning  Patient or legal guardian wants to designate a caregiver (see row info): No    Discharge Preparedness  What is your plan after discharge?: Home with help  What are your discharge supports?: Child  Prior Functional Level: Uses Walker  Difficulity with ADLs: Walking    Functional Assesment  Prior Functional Level: Uses Walker    Finances  Financial Barriers to Discharge: No  Prescription Coverage: Yes    Vision / Hearing Impairment  Right " Eye Vision: Wears Glasses  Left Eye Vision: Wears Glasses         Advance Directive  Advance Directive?: DPOA for Health Care              Discharge Risks or Barriers  Discharge risks or barriers?: Complex medical needs  Patient risk factors: Complex medical needs    Anticipated Discharge Information  Anticipated discharge disposition: Home  Discharge Address: [address] [place] [address] [place] [id number]  Discharge Contact Phone Number: [phone]

## 2018-06-25 NOTE — CARE PLAN
Problem: Safety  Goal: Will remain free from falls  Outcome: PROGRESSING AS EXPECTED  Fall risk assessed and precautions in place. Strip alarm armed in bed and call light within reach. Room close to nurses station. Asking for assistance up to commode.     Problem: Infection  Goal: Will remain free from infection  Outcome: PROGRESSING AS EXPECTED  Port care per policy. Proper hand hygiene before and after care.

## 2018-06-25 NOTE — PROGRESS NOTES
Pt A&Ox3-disoriented to time. Up x1 person assist to commode. Pt with right chest port- flushing with brisk blood return. No acute events overnight. Safety precautions in place and strip alarm armed in bed. All needs met at this time.

## 2018-06-25 NOTE — PROGRESS NOTES
Assumed care of patient at 0700. Pt is A&Ox3, disoriented to time. Pt is up with 1 assist to bedside commode. Bed alarm on for safety. No complaints of pain or nausea. No further needs at this time. Will continue to round hourly.

## 2018-06-25 NOTE — PROGRESS NOTES
[facility] Hospitalist Progress Note    Date of Service: 6/25/2018    Chief Complaint  63 y.o. female admitted 6/23/2018 with altered mental status with hallucinations, dehydration and falls.  Recently started chemotherapy for cholangiocarcinoma with Dr Aleman.    Interval Problem Update  6/24 Patient feeling better but still having hallucinations and confusion.  She does remember me from previous admission and confides that she thought she was dying and has going to be crazy the remainder of her life.  Support given that her confusion was due to the urine infection and should continue to improve now with treatment.  She states that gives her hope.  She thinks she is supposed to have appointment with Dr Aleman tomorrow - I will let him know about her admission.  6/25 Patient diaphoretic when examined, states she is finally feeling better as her fever just broke. T max as documented 100.6.  Given her increasing wbc, continued fever and negative blood and urine cultures for guidance, will change abx from rocephin to levaquin.    Consultants/Specialty  none    Disposition  Home when appropriate.        Review of Systems   Constitutional: Positive for chills, diaphoresis, fever and malaise/fatigue.   HENT: Negative for congestion and sore throat.    Eyes: Negative for blurred vision and photophobia.   Respiratory: Negative for cough and shortness of breath.    Cardiovascular: Negative for chest pain, claudication and leg swelling.   Gastrointestinal: Positive for abdominal pain (chronic). Negative for constipation, diarrhea, heartburn, nausea and vomiting.   Genitourinary: Negative for dysuria and hematuria.   Musculoskeletal: Negative for joint pain and myalgias.   Skin: Negative for itching and rash.   Neurological: Positive for weakness. Negative for dizziness, sensory change, speech change and headaches.   Psychiatric/Behavioral: Positive for hallucinations (better). Negative for depression. The patient is nervous/anxious  (better). The patient does not have insomnia.       Physical Exam  Laboratory/Imaging   Hemodynamics  Temp (24hrs), Av.9 °C (98.5 °F), Min:36.3 °C (97.3 °F), Max:38.1 °C (100.6 °F)   Temperature: (!) 38.1 °C (100.6 °F) (RN notified)  Pulse  Av.3  Min: 86  Max: 114    Blood Pressure: 123/73      Respiratory      Respiration: 18, Pulse Oximetry: 95 %             Fluids    Intake/Output Summary (Last 24 hours) at 18 1011  Last data filed at 18 0900   Gross per 24 hour   Intake             2081 ml   Output              550 ml   Net             1531 ml       Nutrition  Orders Placed This Encounter   Procedures   • Diet Order Regular     Standing Status:   Standing     Number of Occurrences:   1     Order Specific Question:   Diet:     Answer:   Regular [1]     Physical Exam   Constitutional: She is oriented to person, place, and time. She appears well-developed and well-nourished. No distress.   HENT:   Head: Normocephalic and atraumatic.   Eyes: Conjunctivae are normal. No scleral icterus.   Neck: Neck supple. No JVD present.   Cardiovascular: Normal rate, regular rhythm and normal heart sounds.  Exam reveals no gallop and no friction rub.    No murmur heard.  Pulmonary/Chest: Effort normal and breath sounds normal. No respiratory distress. She exhibits no tenderness.   PORT right chest     Abdominal: Soft. Bowel sounds are normal. She exhibits no distension. There is no guarding.   Musculoskeletal: She exhibits no edema or tenderness.   Lymphadenopathy:     She has no cervical adenopathy.   Neurological: She is alert and oriented to person, place, and time. No cranial nerve deficit.   Skin: Skin is warm and dry. She is not diaphoretic. No erythema. No pallor.   Psychiatric: She has a normal mood and affect. Her behavior is normal.   Nursing note and vitals reviewed.      Recent Labs      18   0204  18   1014  18   0220   WBC  9.3  11.5*  12.8*   RBC  3.50*  2.94*  2.93*    HEMOGLOBIN  11.3*  9.6*  9.4*   HEMATOCRIT  35.0*  30.8*  30.2*   MCV  100.0*  104.8*  103.1*   MCH  32.3  32.7  32.1   MCHC  32.3*  31.2*  31.1*   RDW  61.1*  65.3*  63.9*   PLATELETCT  415  476*  519*   MPV  10.2  9.8  9.6     Recent Labs      06/23/18   0204  06/24/18   1014  06/25/18   0220   SODIUM  133*  134*  133*   POTASSIUM  3.7  3.4*  3.8   CHLORIDE  99  104  105   CO2  24  22  22   GLUCOSE  118*  90  102*   BUN  13  7*  5*   CREATININE  0.84  0.47*  0.47*   CALCIUM  9.8  8.3*  8.2*                      Assessment/Plan     * Acute encephalopathy   Assessment & Plan    Patient states it is improving  Suspect due to UTI  Continue to orient patient, avoid sedatives as able.          Acute cystitis without hematuria   Assessment & Plan    Likely the primary source of encephalopathy  Rocephin to levaquin to broaden coverage as patient febrile and increasing WBC  Urine culture negative           Cholangiocarcinoma metastatic to liver (HCC)- (present on admission)   Assessment & Plan    Started chemotherapy with Dr Aleman 2 weeks ago, clinic aware of admission, today's appointment cancelled.          Hypokalemia   Assessment & Plan    Add Kcl to IVF          Anemia associated with chemotherapy   Assessment & Plan    HGB dropped with hydration  Chemotherapy/cancer induced  No signs of bleeding  Macrocytic - b12/folate normal.            Hyponatremia   Assessment & Plan    Mild, should improve with IVF         Dehydration- (present on admission)   Assessment & Plan    Continue with IVF          Depression- (present on admission)   Assessment & Plan    Continue wellbutrin          Neuropathy (HCC)- (present on admission)   Assessment & Plan    Continue neurontin            Quality-Core Measures   Reviewed items::  Labs reviewed, Medications reviewed and Radiology images reviewed  Duaret catheter::  No Duarte  DVT prophylaxis - mechanical:  SCDs  Antibiotics:  Treating active infection/contamination beyond 24 hours  perioperative coverage

## 2018-06-26 NOTE — PROGRESS NOTES
[facility] Hospitalist Progress Note    Date of Service: 2018    Chief Complaint    63 y.o. female admitted 2018 with altered mental status with hallucinations, dehydration and falls.  Recently started chemotherapy for cholangiocarcinoma with Dr Aleman  Interval Problem Update  Resting in bed, no new complains, not in distress, last fever last night. Tolerating diet.    Consultants/Specialty  none    Disposition  Probably home when stable,will get PT/OT eval.         Review of Systems   Constitutional: Negative for fever.   HENT: Negative for congestion and sinus pain.    Eyes: Negative for blurred vision and photophobia.   Respiratory: Negative for cough and sputum production.    Cardiovascular: Negative for chest pain and PND.   Gastrointestinal: Negative for abdominal pain and heartburn.   Genitourinary: Negative for dysuria and frequency.   Musculoskeletal: Negative for back pain and myalgias.   Skin: Negative for itching.   Neurological: Positive for weakness. Negative for dizziness and tingling.   Endo/Heme/Allergies: Does not bruise/bleed easily.   Psychiatric/Behavioral: Negative for depression and substance abuse.      Physical Exam  Laboratory/Imaging   Hemodynamics  Temp (24hrs), Av.1 °C (97 °F), Min:35.9 °C (96.7 °F), Max:36.3 °C (97.4 °F)   Temperature: 36.1 °C (97 °F)  Pulse  Av.3  Min: 86  Max: 114    Blood Pressure: (!) 94/59 (RN notified)      Respiratory      Respiration: 18, Pulse Oximetry: 97 %             Fluids    Intake/Output Summary (Last 24 hours) at 18 1626  Last data filed at 18 0500   Gross per 24 hour   Intake             1070 ml   Output              200 ml   Net              870 ml       Nutrition  Orders Placed This Encounter   Procedures   • Diet Order Regular     Standing Status:   Standing     Number of Occurrences:   1     Order Specific Question:   Diet:     Answer:   Regular [1]     Physical Exam   Constitutional: She is oriented to person, place, and  time. She appears cachectic. No distress.   HENT:   Head: Normocephalic.   Mouth/Throat: No oropharyngeal exudate.   Eyes: Conjunctivae are normal.   Neck: Normal range of motion. Neck supple. No JVD present.   Cardiovascular: Normal rate, regular rhythm and normal heart sounds.    Pulmonary/Chest: Effort normal and breath sounds normal. No respiratory distress. She has no wheezes. She has no rales.   Abdominal: Soft. Bowel sounds are normal. She exhibits no distension. There is no tenderness. There is no rebound.   Musculoskeletal: Normal range of motion. She exhibits no tenderness.   Lymphadenopathy:     She has no cervical adenopathy.   Neurological: She is alert and oriented to person, place, and time. She exhibits normal muscle tone.   Skin: She is not diaphoretic. No erythema.   Psychiatric: She has a normal mood and affect.   Nursing note and vitals reviewed.      Recent Labs      06/24/18   1014  06/25/18   0220  06/26/18   0100   WBC  11.5*  12.8*  12.3*   RBC  2.94*  2.93*  2.98*   HEMOGLOBIN  9.6*  9.4*  9.5*   HEMATOCRIT  30.8*  30.2*  30.7*   MCV  104.8*  103.1*  103.0*   MCH  32.7  32.1  31.9   MCHC  31.2*  31.1*  30.9*   RDW  65.3*  63.9*  64.1*   PLATELETCT  476*  519*  540*   MPV  9.8  9.6  9.5     Recent Labs      06/24/18   1014  06/25/18   0220  06/26/18   0100   SODIUM  134*  133*  135   POTASSIUM  3.4*  3.8  3.8   CHLORIDE  104  105  105   CO2  22  22  23   GLUCOSE  90  102*  90   BUN  7*  5*  4*   CREATININE  0.47*  0.47*  0.39*   CALCIUM  8.3*  8.2*  8.4*                      Assessment/Plan     * Acute encephalopathy   Assessment & Plan    Better, likely due to uti.          Acute cystitis without hematuria   Assessment & Plan    Likely the primary source of encephalopathy  On levaquin, urine cx neg. Last fever 6/25 at 7AM        Cholangiocarcinoma metastatic to liver (HCC)- (present on admission)   Assessment & Plan    Started chemotherapy with Dr Aleman 2 weeks ago.  Will need to f/u with  Dr Aleman when d/c.           Hypokalemia   Assessment & Plan    Resolved.           Anemia associated with chemotherapy   Assessment & Plan    Chemotherapy/cancer induced.  Hb stable. No signs of active bleeding.             Hyponatremia   Assessment & Plan    Resolved with ivf.         Dehydration- (present on admission)   Assessment & Plan    Resolved.          Depression- (present on admission)   Assessment & Plan    on wellbutrin.          Neuropathy (HCC)- (present on admission)   Assessment & Plan    Continue neurontin.            Quality-Core Measures   Reviewed items::  Labs reviewed, Medications reviewed and Radiology images reviewed  DVT prophylaxis pharmacological::  Enoxaparin (Lovenox)  Antibiotics:  Treating active infection/contamination beyond 24 hours perioperative coverage

## 2018-06-26 NOTE — CARE PLAN
Problem: Communication  Goal: The ability to communicate needs accurately and effectively will improve    Intervention: Educate patient and significant other/support system about the plan of care, procedures, treatments, medications and allow for questions  Discussed POC for night with pt at bedside. Encouraged pt to call for assistance when needed and to voice feelings and any questions.       Problem: Safety  Goal: Will remain free from falls    Intervention: Implement fall precautions  Fall precautions in place. Bed alarm - ON.

## 2018-06-26 NOTE — PROGRESS NOTES
Assumed care of pt at 1845. Received report from BERHANE Kelly. Pt A/O x 3 - time, sitting up in bed watching TV w/ family at bedside. Port accessed - NS + 20 K+ running at 100 mL/hr. Discussed POC with pt at bedside. No complaints of pain or nausea. Pt up 1 assist to bedside commode. Bed in lowest, locked position. Call light and personal belongings within reach. Pt has no further questions or concerns at this time.Q2 Hourly rounding in place.

## 2018-06-26 NOTE — PROGRESS NOTES
Pt is Aox3, disoriented to time this morning. Port in place with + blood return, c/o pain, long-acting given, heat pack provided. Declines other intervention at this time. Denies nausea.

## 2018-06-27 NOTE — CARE PLAN
Problem: Pain Management  Goal: Pain level will decrease to patient's comfort goal    Intervention: Educate and implement non-pharmacologic comfort measures. Examples: relaxation, distration, play therapy, activity therapy, massage, etc.  Patient educated to notify RN when onset of pain begins, and to use non-pharmacologic means to alleviate pain.

## 2018-06-27 NOTE — THERAPY
"Occupational Therapy Evaluation completed.   Functional Status: Supv supine > < EOB, supv transfers with FWW, supv LB dressing, supv toileting   Plan of Care: Patient with no further skilled OT needs in the acute care setting at this time  Discharge Recommendations:  Equipment: No Equipment Needed. Post-acute therapy: Pt may benefit from HH OT on DC to progress I-ADL, however declines due to living with multiple family members including grandkids.     See \"Rehab Therapy-Acute\" Patient Summary Report for complete documentation.    63 y.o. female with h/o metastatic cholangiocarcinoma, s/p chemo, admitted for malaise and weakness. Pt dx with UTI, dehydration. Seen now for OT eval. Pt completed: supine > < EOB, amb to/from bathroom, toileting for urination, LB dressing. Pt states she uses laundry hamper to faciliate leverage for on/off toilet. Educated on options of commode over toilet or RTS. Pt had difficulty standing from low toilet on this assessment. Trained on pushing from toilet seat with BUE. Pt able to demo technique with supv. Pt has shower chair at home, but does not normally use. Educated on fall risk with bathing due to increased weakness and advised on use of chair until strength improves. Pt states she will use chair as needed. Pt is at or near baseline function from OT standpoint in this setting. No further acute OT needs at this time.     "

## 2018-06-27 NOTE — PROGRESS NOTES
Assumed care of pt at 1845. Received report from BERHANE Merino. Pt A/O x 3 - time, sitting up in bed watching TV. Port patent w/good return running  NS + 20 K+ at 100 mL/hr. Discussed POC with pt at bedside. No complaints of pain or nausea. Pt up 1 assist to bedside commode. Bed in lowest, locked position. Call light and personal belongings within reach. Pt has no further questions or concerns at this time.Q2 Hourly rounding in place.

## 2018-06-27 NOTE — THERAPY
"Pt is a 62 y/o female admited for AMS, found to have UTI with hx of metastatic cholangiocarcinoma for which pt has undergone chemotherapy tx. Pt very pleasant and reports she was doing well at home following D/C from previous admission. Pt presents to physical therapy with impairments in functional mobility, gait, balance and activity tolerance. Pt ambulated 100 ft with FWW and CGA, smooth but slow gait. Ambulted on RA and noted SpO2 to maintain in low 90s. Pt will benefit from acute PT interventions to address present impairments and optimize return to independent PLOF.     Physical Therapy Evaluation completed.   Bed Mobility:  Supine to Sit: Supervised  Transfers: Sit to Stand: Stand by Assist  Gait: Level Of Assist: Contact Guard Assist with Front-Wheel Walker       Plan of Care: Will benefit from Physical Therapy 3 times per week  Discharge Recommendations: Equipment: Will Continue to Assess for Equipment Needs. Post-acute therapy: Anticipate pt should be able to D/C home with family support with continued mobility in acute setting.       See \"Rehab Therapy-Acute\" Patient Summary Report for complete documentation.     "

## 2018-06-27 NOTE — FACE TO FACE
Face to Face Supporting Documentation - Home Health    The encounter with this patient was in whole or in part the primary reason for home health admission.    Date of encounter:   Patient:                    MRN:                       YOB: 2016  Tonya Collins  [id number]  1955     Home health to see patient for:  Skilled Nursing care for assessment, interventions & education    Skilled need for:  Comment: needs PT/OT     Skilled nursing interventions to include:  Comment: needs PT/TO    Homebound status evidenced by:  Needs the assistance of another person in order to leave the home. Leaving home requires a considerable and taxing effort. There is a normal inability to leave the home.    Community Physician to provide follow up care: Tg Goodwin M.D.     Optional Interventions? No      I certify the face to face encounter for this home health care referral meets the CMS requirements and the encounter/clinical assessment with the patient was, in whole, or in part, for the medical condition(s) listed above, which is the primary reason for home health care. Based on my clinical findings: the service(s) are medically necessary, support the need for home health care, and the homebound criteria are met.  I certify that this patient has had a face to face encounter by myself.  Germain Fontana M.D. - NPI: [id number]

## 2018-06-27 NOTE — PROGRESS NOTES
[facility]ist Progress Note    Date of Service: 2018    Chief Complaint    63 y.o. female admitted 2018 with altered mental status with hallucinations, dehydration and falls.  Recently started chemotherapy for cholangiocarcinoma with Dr Aleman  Interval Problem Update  Patient still feels weak, PT saw her today and wants to f/u tomorrow if stable will probably dc home tomorrow, OT recommended HHC but patient refused, will discussed with SW, no more fever, tolerating diet.   Patient still requiring o2 needs to continue using her IS, bp is borderline will continue monitoring, needs to ambulate more.     Consultants/Specialty  none    Disposition  Probably home tomorrow if ok with PT.          Review of Systems   Constitutional: Negative for chills.   HENT: Negative for nosebleeds and sinus pain.    Eyes: Negative for blurred vision and double vision.   Respiratory: Negative for hemoptysis and sputum production.    Cardiovascular: Negative for chest pain and leg swelling.   Gastrointestinal: Negative for heartburn and vomiting.   Genitourinary: Negative for dysuria and urgency.   Musculoskeletal: Negative for myalgias and neck pain.   Neurological: Positive for weakness. Negative for dizziness and tingling.   Endo/Heme/Allergies: Does not bruise/bleed easily.   Psychiatric/Behavioral: Negative for depression and substance abuse.      Physical Exam  Laboratory/Imaging   Hemodynamics  Temp (24hrs), Av.6 °C (97.9 °F), Min:36.1 °C (97 °F), Max:37.1 °C (98.7 °F)   Temperature: 36.8 °C (98.3 °F)  Pulse  Av.5  Min: 71  Max: 114    Blood Pressure: (!) 97/57      Respiratory      Respiration: 16, Pulse Oximetry: 95 %             Fluids    Intake/Output Summary (Last 24 hours) at 18 1543  Last data filed at 18 1015   Gross per 24 hour   Intake              200 ml   Output                0 ml   Net              200 ml       Nutrition  Orders Placed This Encounter   Procedures   • Diet Order  Regular     Standing Status:   Standing     Number of Occurrences:   1     Order Specific Question:   Diet:     Answer:   Regular [1]     Physical Exam   Constitutional: She is oriented to person, place, and time. She appears cachectic. No distress.   HENT:   Head: Normocephalic and atraumatic.   Eyes: Conjunctivae are normal. No scleral icterus.   Neck: Normal range of motion. Neck supple.   Cardiovascular: Normal rate, regular rhythm and normal heart sounds.    Pulmonary/Chest: Effort normal and breath sounds normal. No respiratory distress. She has no rales.   Abdominal: Soft. Bowel sounds are normal. She exhibits no distension. There is no rebound.   Musculoskeletal: Normal range of motion. She exhibits no tenderness.   Neurological: She is alert and oriented to person, place, and time. She exhibits normal muscle tone.   Skin: No erythema.   Psychiatric: She has a normal mood and affect.   Nursing note and vitals reviewed.      Recent Labs      06/25/18   0220  06/26/18   0100  06/27/18   0032   WBC  12.8*  12.3*  11.4*   RBC  2.93*  2.98*  2.91*   HEMOGLOBIN  9.4*  9.5*  9.4*   HEMATOCRIT  30.2*  30.7*  30.1*   MCV  103.1*  103.0*  103.4*   MCH  32.1  31.9  32.3   MCHC  31.1*  30.9*  31.2*   RDW  63.9*  64.1*  66.2*   PLATELETCT  519*  540*  529*   MPV  9.6  9.5  9.6     Recent Labs      06/25/18   0220  06/26/18   0100   SODIUM  133*  135   POTASSIUM  3.8  3.8   CHLORIDE  105  105   CO2  22  23   GLUCOSE  102*  90   BUN  5*  4*   CREATININE  0.47*  0.39*   CALCIUM  8.2*  8.4*                      Assessment/Plan     * Acute encephalopathy   Assessment & Plan    resolved, likely due to uti.          Acute cystitis without hematuria   Assessment & Plan    Likely the primary source of encephalopathy  On levaquin, urine cx neg. Last fever 6/25 at 7AM        Cholangiocarcinoma metastatic to liver (HCC)- (present on admission)   Assessment & Plan    Started chemotherapy with Dr Aleman 2 weeks ago.  Will need to f/u  with Dr Aleman when d/c.           Hypokalemia   Assessment & Plan    Resolved.           Anemia associated with chemotherapy   Assessment & Plan    Chemotherapy/cancer induced.  Hb stable. No signs of active bleeding.             Hyponatremia   Assessment & Plan    Resolved with ivf.         Dehydration- (present on admission)   Assessment & Plan    Resolved.          Depression- (present on admission)   Assessment & Plan    on wellbutrin.          Neuropathy (HCC)- (present on admission)   Assessment & Plan    Continue neurontin.            Quality-Core Measures   Reviewed items::  Labs reviewed, Medications reviewed and Radiology images reviewed  DVT prophylaxis pharmacological::  Enoxaparin (Lovenox)  Antibiotics:  Treating active infection/contamination beyond 24 hours perioperative coverage

## 2018-06-28 NOTE — DISCHARGE PLANNING
Anticipated Discharge Disposition: Home w/ HH    Action: LSW met w/ pt at bedside and explained HH services and pt agreeable to HH. Pt did mention that although she is agreeable to HH that her family member will not be agreeable. Pt agreed to give it a try and will talk w/ [facility] when they call. Pt signed CHOICE for [facility] and LSW faxed signed form to [facility] ([id number]      Barriers to Discharge: Acceptance to HH.     Plan: Awaiting acceptance to HH.

## 2018-06-28 NOTE — PROGRESS NOTES
[facility]ist Progress Note    Date of Service: 2018    Chief Complaint    63 y.o. female admitted 2018 with altered mental status with hallucinations, dehydration and falls.  Recently started chemotherapy for cholangiocarcinoma with Dr Aleman  Interval Problem Update  Feel better although still requiring 2 L of o2, did ambulation test and she still required 1L with ambulation, patient needs to ambulate more and use IS, patient having no more fever, denies dizziness, wbc is back to normal.   C ordered, patient will be dc'ed home if able to be wean off o2,     Consultants/Specialty  none    Disposition  Probably home tomorrow if ok with PT.          Review of Systems   Constitutional: Negative for chills.   HENT: Negative for congestion.    Eyes: Negative for photophobia.   Respiratory: Negative for cough and shortness of breath.    Cardiovascular: Negative for palpitations and leg swelling.   Gastrointestinal: Negative for heartburn.   Genitourinary: Negative for dysuria and flank pain.   Musculoskeletal: Negative for back pain and myalgias.   Neurological: Positive for weakness. Negative for dizziness and headaches.   Endo/Heme/Allergies: Does not bruise/bleed easily.   Psychiatric/Behavioral: Negative for depression and substance abuse.      Physical Exam  Laboratory/Imaging   Hemodynamics  Temp (24hrs), Av.6 °C (97.8 °F), Min:36.2 °C (97.2 °F), Max:37.1 °C (98.7 °F)   Temperature: 36.6 °C (97.9 °F)  Pulse  Av.5  Min: 71  Max: 114    Blood Pressure: 112/63      Respiratory      Respiration: 16, Pulse Oximetry: 92 %             Fluids    Intake/Output Summary (Last 24 hours) at 18 1541  Last data filed at 18 0930   Gross per 24 hour   Intake              250 ml   Output                0 ml   Net              250 ml       Nutrition  Orders Placed This Encounter   Procedures   • Diet Order Regular     Standing Status:   Standing     Number of Occurrences:   1     Order Specific  Question:   Diet:     Answer:   Regular [1]     Physical Exam   Constitutional: She is oriented to person, place, and time. She appears cachectic. No distress.   HENT:   Head: Normocephalic and atraumatic.   Eyes: Conjunctivae are normal.   Neck: Normal range of motion. Neck supple.   Cardiovascular: Normal rate, regular rhythm and normal heart sounds.    Pulmonary/Chest: Effort normal and breath sounds normal. No respiratory distress. She has no rales.   Abdominal: Soft. Bowel sounds are normal. She exhibits no distension. There is no tenderness.   Musculoskeletal: Normal range of motion. She exhibits no tenderness.   Neurological: She is alert and oriented to person, place, and time. She exhibits normal muscle tone.   Skin: No erythema.   Psychiatric: She has a normal mood and affect.   Nursing note and vitals reviewed.      Recent Labs      06/26/18   0100  06/27/18   0032  06/28/18   0046   WBC  12.3*  11.4*  10.7   RBC  2.98*  2.91*  2.95*   HEMOGLOBIN  9.5*  9.4*  9.5*   HEMATOCRIT  30.7*  30.1*  30.0*   MCV  103.0*  103.4*  101.7*   MCH  31.9  32.3  32.2   MCHC  30.9*  31.2*  31.7*   RDW  64.1*  66.2*  64.1*   PLATELETCT  540*  529*  480*   MPV  9.5  9.6  9.1     Recent Labs      06/26/18   0100   SODIUM  135   POTASSIUM  3.8   CHLORIDE  105   CO2  23   GLUCOSE  90   BUN  4*   CREATININE  0.39*   CALCIUM  8.4*                      Assessment/Plan     * Acute encephalopathy   Assessment & Plan    resolved, likely due to uti.          Acute cystitis without hematuria   Assessment & Plan    Likely the primary source of encephalopathy  On levaquin, urine cx neg. Last fever 6/25 at 7AM  Wbc is back to normal.         Cholangiocarcinoma metastatic to liver (HCC)- (present on admission)   Assessment & Plan    Started chemotherapy with Dr Aleman 2 weeks ago.  Will need to f/u with Dr Aleman when d/c.           Hypokalemia   Assessment & Plan    Resolved.           Anemia associated with chemotherapy   Assessment & Plan     Chemotherapy/cancer induced.  Hb stable. No signs of active bleeding.             Hyponatremia   Assessment & Plan    Resolved with ivf.         Dehydration- (present on admission)   Assessment & Plan    Resolved.          Depression- (present on admission)   Assessment & Plan    on wellbutrin.          Neuropathy (HCC)- (present on admission)   Assessment & Plan    Continue neurontin.            Quality-Core Measures   Reviewed items::  Labs reviewed, Medications reviewed and Radiology images reviewed  DVT prophylaxis pharmacological::  Enoxaparin (Lovenox)  Antibiotics:  Treating active infection/contamination beyond 24 hours perioperative coverage

## 2018-06-28 NOTE — DISCHARGE PLANNING
Anticipated Discharge Disposition: Home w/ HH    Action: LSW notified that pt may require O2 upon dc. LSW notified [facility] of order and F2F and it was explained that the pt does not have a qualifying diagnosis for O2 and pt's stats are not all lower than 88. LSW notified that the pt will likely have to pay out of pocket or [facility] can do an Approved Services if pt needs O2. LSW updated bedside RN and MD.     MD explained that pt can ambulate more and nursing to try to wean pt off O2 and dc tomorrow (6/29).    Barriers to Discharge: O2    Plan: Pt to ambulate more w/ nursing to wean off O2.

## 2018-06-28 NOTE — DISCHARGE PLANNING
We are currently verifying MD acceptance of your patient. This will be resolved ASAP. If you want this escalated for a faster response please call [phone] and press option #2. Thank you for your patience.    Respectfully,   [facility]

## 2018-06-28 NOTE — CARE PLAN
Problem: Venous Thromboembolism (VTW)/Deep Vein Thrombosis (DVT) Prevention:  Goal: Patient will participate in Venous Thrombosis (VTE)/Deep Vein Thrombosis (DVT)Prevention Measures  Outcome: PROGRESSING AS EXPECTED  Lovenox given. Pt refuses SCD's despite education.     Problem: Bowel/Gastric:  Goal: Normal bowel function is maintained or improved  Outcome: PROGRESSING AS EXPECTED  Pt denies nausea and vomiting. Last BM this morning.     Problem: Pain Management  Goal: Pain level will decrease to patient's comfort goal  Outcome: NOT MET  Pt denies pain. Morphine ER 30 mg given.     Problem: Mobility  Goal: Risk for activity intolerance will decrease  Outcome: PROGRESSING AS EXPECTED  Pt ambulated to the bathroom with 1 standby assist.

## 2018-06-28 NOTE — CARE PLAN
Problem: Safety  Goal: Will remain free from falls    Intervention: Implement fall precautions  Fall precautions in place.      Problem: Pain Management  Goal: Pain level will decrease to patient's comfort goal    Intervention: Follow pain managment plan developed in collaboration with patient and Interdisciplinary Team  Medicated for pain per MAR.

## 2018-06-28 NOTE — DISCHARGE PLANNING
Received Choice form at 1597  Agency/Facility Name: [facility]  Referral sent per Choice form @ 1909

## 2018-06-28 NOTE — FACE TO FACE
Face to Face Note  -  Durable Medical Equipment    Germain Fontana M.D. - NPI: [id number]  I certify that this patient is under my care and that they had a durable medical equipment(DME)face to face encounter by myself that meets the physician DME face-to-face encounter requirements with this patient on:    Date of encounter:   Patient:                    MRN:                       YOB: 2016  Tonya Collins  [id number]  1955     The encounter with the patient was in whole, or in part, for the following medical condition, which is the primary reason for durable medical equipment:  Other - hypoxia.  cholangiocarcinoma.     I certify that, based on my findings, the following durable medical equipment is medically necessary:  Oxygen.    HOME O2 Saturation Measurements:(Values must be present for Home Oxygen orders)  Room air sat at rest: 89  Room air sat with amb: 87  With liters of O2: 1, O2 sat at rest with O2: 93  With Liters of O2: 1, O2 sat with amb with O2 : 92  Is the patient mobile?: Yes    My Clinical findings support the need for the above equipment due to:  Hypoxia    Supporting Symptoms: hypoxia with ambulation.

## 2018-06-28 NOTE — PROGRESS NOTES
Assumed care of pt @0700. Bedside report received. Pt AOX 4. Possible discharge home today. Pt denies pain. Scheduled Morphine ER 30 mg given. Pt denies nausea. Last BM this morning. Pt on 1 L02 saturating above 90 %. Port running IV fluids. Pt up with standby assist. Fall precaution in place. POC discussed with pt, all questions answered at this time. Pt makes needs known, call light within reach, hourly rounding in place.

## 2018-06-28 NOTE — PROGRESS NOTES
Assumed care of pt at 1845. Pt A&Ox4, lying in bed watching TV. Patient expresses fatigue from P/T visit today.  Port patent w/good return, running NS w/ 20 K+ at 100 mL/hr. Discussed POC with pt at bedside. No complaints of pain or nausea. Patient did not eat dinner, expressing no appetite for food.  Pt is up 1 assist to bathroom. Bed in lowest, locked position. Call light and personal belongings within reach. Pt has no further questions or concerns at this time.Q2 Hourly rounding in place.

## 2018-06-29 NOTE — PROGRESS NOTES
Received report and assumed patient care at change of shift. Patient is resting in bed, A&Ox4. Patient reports 9/10 pain at this time, medications provided per MAR.     Plan of care discussed, questions answered. Bed is in the lowest position and locked, call light within reach, non-skid socks in place, hourly rounding. Patient reports no further needs and this time.

## 2018-06-29 NOTE — CARE PLAN
Problem: Safety  Goal: Will remain free from injury  Outcome: PROGRESSING AS EXPECTED  Bed alarm on, A&O x4, slipper socks, bed locked and in low position, call light and personal belongings with reach, report given to CNA, appropriate signs outside door, hourly rounding in place.     Problem: Bowel/Gastric:  Goal: Normal bowel function is maintained or improved  Outcome: PROGRESSING SLOWER THAN EXPECTED  Pt complains about nausea. Zofran given.     Problem: Pain Management  Goal: Pain level will decrease to patient's comfort goal  Outcome: PROGRESSING SLOWER THAN EXPECTED  Pt complains about pain. Scheduled Morphine ER 30 mg and Roxicodone 5 mg given.     Problem: Mobility  Goal: Risk for activity intolerance will decrease  Outcome: PROGRESSING AS EXPECTED  Pt up for breakfast. Pt ambulates with standby assist.

## 2018-06-29 NOTE — THERAPY
"Pt continues to make steady progress with functional mobility and activity tolerance. Pt reports feeling well today, lunch was ok and she feels she ate more than she ususally is able to. Pt ambulated 125 ft with steady gait, no LOB noted. Pt only required PT assist to stand from low toilet surface height, Mod A required. PT will continue to follow, please continue to mobilize with nursing staff. Pt appears functionally capable of D/C home at this time.     Physical Therapy Treatment completed.   Bed Mobility:  Supine to Sit: Supervised  Transfers: Sit to Stand: Supervised  Gait: Level Of Assist: Stand by Assist with Front-Wheel Walker       Plan of Care: Will benefit from Physical Therapy 3 times per week  Discharge Recommendations: Equipment: Will Continue to Assess for Equipment Needs. Pt appears to have all necessary DME, refer to OT recs as well.   Post-acute therapy:  Recommend HH therapy follow up if pt agreeable and an option.     See \"Rehab Therapy-Acute\" Patient Summary Report for complete documentation.       "

## 2018-06-29 NOTE — DISCHARGE PLANNING
Agency/Facility Name: [facility]  Spoke To: Belen  Outcome: Attempted to get status, however, they are still reviewing.

## 2018-06-29 NOTE — DISCHARGE PLANNING
ATTN: Case Management  RE: Referral for Home Health                We would like to take this opportunity to thank you for submitting a referral for your patient to continue care with [facility]. Our skilled team is dedicated to helping all patients recover and gain independence in the home setting.            As of 6/29/18, we have accepted the above patient into our service. A [facility] clinician will be out to see the patient within 48 hours to conduct our initial visit. If you have any questions or concerns regarding the patient’s transition to Home Health, please do not hesitate to contact us. We are open for referrals 7 days a week from 8AM to 5PM at [phone].      We look forward to collaborating with you,  [facility] Team

## 2018-06-29 NOTE — PROGRESS NOTES
Assumed care of pt @0700. Bedside report received. Pt AOX 4. Pt on RA. Pt complains about pain. Scheduled Morphine ER 30 mg and Roxicodone 5 mg given. Pt complains about nausea. Zofran given. Port patent with positive blood return. Last Bm 06/28. Pt up with standby assist. Fall precaution in place. POC discussed with pt, all questions answered at this time. Pt makes needs known, call light within reach, hourly rounding in place.

## 2018-06-29 NOTE — DISCHARGE PLANNING
Anticipated Discharge Disposition: Home with [facility].     Action: Reviewed chart.  Pt accepted by [facility] following referral.     Barriers to Discharge: none    Plan: Home with son, DIL and [facility].

## 2018-06-29 NOTE — CARE PLAN
Problem: Safety  Goal: Will remain free from injury    Intervention: Provide assistance with mobility  Patient educated on unit policies and procedures to prevent fall risk, including: hourly rounding, call light usage, bed alarms, treaded socks and calling for assistance. Patient Donaldone verbalizes understanding of teaching. Fall prevention measures assessed and in place.

## 2018-06-29 NOTE — PROGRESS NOTES
[facility]ist Progress Note    Date of Service: 2018    Chief Complaint    63 y.o. female admitted 2018 with altered mental status with hallucinations, dehydration and falls.  Recently started chemotherapy for cholangiocarcinoma with Dr Aleman  Interval Problem Update  Patient having nausea this morning and her wbc went up to 13,000, she is off o2 now denies any fever, or chills. Will have to keep her for one extra day today if wbc stable and tolerating diet will dc in am.      Consultants/Specialty  none    Disposition  Probably home tomorrow if ok with PT.          Review of Systems   Constitutional: Negative for chills and fever.   HENT: Negative for congestion.    Respiratory: Negative for cough, sputum production and shortness of breath.    Cardiovascular: Negative for palpitations and claudication.   Gastrointestinal: Negative for heartburn.   Genitourinary: Negative for dysuria and urgency.   Musculoskeletal: Negative for myalgias and neck pain.   Neurological: Positive for weakness. Negative for dizziness and headaches.   Endo/Heme/Allergies: Does not bruise/bleed easily.   Psychiatric/Behavioral: Negative for depression and substance abuse.      Physical Exam  Laboratory/Imaging   Hemodynamics  Temp (24hrs), Av.5 °C (97.7 °F), Min:36.2 °C (97.2 °F), Max:36.8 °C (98.3 °F)   Temperature: 36.7 °C (98 °F)  Pulse  Av.9  Min: 71  Max: 114    Blood Pressure: 113/70      Respiratory      Respiration: 20, Pulse Oximetry: 91 %             Fluids    Intake/Output Summary (Last 24 hours) at 18 1545  Last data filed at 18 1345   Gross per 24 hour   Intake              940 ml   Output                0 ml   Net              940 ml       Nutrition  Orders Placed This Encounter   Procedures   • Diet Order Regular     Standing Status:   Standing     Number of Occurrences:   1     Order Specific Question:   Diet:     Answer:   Regular [1]     Physical Exam   Constitutional: She is oriented  to person, place, and time. She appears cachectic. No distress.   HENT:   Head: Normocephalic and atraumatic.   Eyes: Conjunctivae are normal.   Neck: Normal range of motion. Neck supple.   Cardiovascular: Normal rate, regular rhythm and normal heart sounds.    Pulmonary/Chest: Effort normal and breath sounds normal. No respiratory distress. She has no rales.   Abdominal: Soft. Bowel sounds are normal. She exhibits no distension. There is no rebound.   Musculoskeletal: Normal range of motion. She exhibits no tenderness.   Neurological: She is alert and oriented to person, place, and time. She exhibits normal muscle tone.   Skin: No erythema.   Psychiatric: She has a normal mood and affect.   Nursing note and vitals reviewed.      Recent Labs      06/28/18   0046  06/29/18   0155  06/29/18   0827   WBC  10.7  13.9*  13.9*   RBC  2.95*  3.08*  3.10*   HEMOGLOBIN  9.5*  10.0*  9.9*   HEMATOCRIT  30.0*  31.1*  31.9*   MCV  101.7*  101.0*  102.9*   MCH  32.2  32.5  31.9   MCHC  31.7*  32.2*  31.0*   RDW  64.1*  66.6*  66.6*   PLATELETCT  480*  530*  497*   MPV  9.1  9.2  9.5                          Assessment/Plan     * Acute encephalopathy   Assessment & Plan    resolved, likely due to uti.          Acute cystitis without hematuria   Assessment & Plan    Likely the primary source of encephalopathy  On levaquin, urine cx neg. Last fever 6/25 at 7AM  Had a increased in her wbc today, no fever, will keep her for one more day, cx neg.         Cholangiocarcinoma metastatic to liver (HCC)- (present on admission)   Assessment & Plan    Started chemotherapy with Dr Aleman 2 weeks ago.  Will need to f/u with Dr Aleman when d/c.           Hypokalemia   Assessment & Plan    Resolved.           Anemia associated with chemotherapy   Assessment & Plan    Chemotherapy/cancer induced.  Hb stable. No signs of active bleeding.             Hyponatremia   Assessment & Plan    Resolved with ivf.         Dehydration- (present on admission)    Assessment & Plan    Resolved.          Depression- (present on admission)   Assessment & Plan    on wellbutrin.          Neuropathy (HCC)- (present on admission)   Assessment & Plan    Continue neurontin.            Quality-Core Measures   Reviewed items::  Labs reviewed, Medications reviewed and Radiology images reviewed  DVT prophylaxis pharmacological::  Enoxaparin (Lovenox)  Antibiotics:  Treating active infection/contamination beyond 24 hours perioperative coverage

## 2018-06-30 PROBLEM — J18.9 HCAP (HEALTHCARE-ASSOCIATED PNEUMONIA): Status: ACTIVE | Noted: 2018-01-01

## 2018-06-30 NOTE — CARE PLAN
Problem: Skin Integrity  Goal: Risk for impaired skin integrity will decrease    Intervention: Assess risk factors for impaired skin integrity and/or pressure ulcers  Educated pt on importance of turning while in bed and lying on alternates sides to promote circulation to the sacral area and prevent pu's. Pt states understanding and agreed to comply.      Problem: Mobility  Goal: Risk for activity intolerance will decrease    Intervention: Assess and monitor signs of activity intolerance  Pt is SBA with walker. Pt has good spatial awareness of IV pole and lines. Pt steady on feet and encouraged to ambulate 3xday. Pt states understanding and agrees to comply.

## 2018-06-30 NOTE — CARE PLAN
Problem: Safety  Goal: Will remain free from injury  Patient educated on unit policies and procedures to prevent fall risk, including: hourly rounding, call light usage, bed alarms, treaded socks and calling for assistance. Patient Ferne verbalizes understanding of teaching. Fall prevention measures assessed and in place.     Problem: Pain Management  Goal: Pain level will decrease to patient's comfort goal    Intervention: Educate and implement non-pharmacologic comfort measures. Examples: relaxation, distration, play therapy, activity therapy, massage, etc.  Pain assessed and documented per unit protocol and appropriate pharmacological and non-pharmacological interventions implemented. Reviewed pain goals with patient and family.

## 2018-06-30 NOTE — PROGRESS NOTES
De-accessed port, re-accessed with new needle and dressing per protocol. Confirmed placement with blood return. Patient tolerated procedure well. Charted accordingly in EPIC.

## 2018-06-30 NOTE — ASSESSMENT & PLAN NOTE
Maryann completed 7/6  ID consultation  s/p thoracentesis 7/2/18 with 360cc removed  Culture from this pending  Increased WBC persistent though improving respiratory wise  ID s/o     Will resume levaquin 750 daily x 7 more days    f/u cxr 7/10 showed: 1.  Persistent hypoinflation and mild bibasilar atelectasis.  2.  Probable minimal bilateral pleural effusions.    Will add is

## 2018-06-30 NOTE — PROGRESS NOTES
[facility] Hospitalist Progress Note    Date of Service: 2018    Chief Complaint    63 y.o. female admitted 2018 with altered mental status with hallucinations, dehydration and falls.  Recently started chemotherapy for cholangiocarcinoma with Dr Aleman  Interval Problem Update  Developed new pneumonia, wbc is trending up, no fever, elevated procalcitonin, will stop levaquin and start zosyn, needs to work with IS, needs to ambulate.      Consultants/Specialty  none    Disposition  Probably home tomorrow if ok with PT.          Review of Systems   Constitutional: Negative for diaphoresis and fever.   HENT: Negative for nosebleeds.    Respiratory: Negative for cough, hemoptysis, sputum production and shortness of breath.    Cardiovascular: Negative for palpitations, orthopnea and claudication.   Gastrointestinal: Negative for heartburn.   Genitourinary: Negative for dysuria and frequency.   Musculoskeletal: Negative for myalgias and neck pain.   Neurological: Negative for dizziness and weakness.   Endo/Heme/Allergies: Does not bruise/bleed easily.   Psychiatric/Behavioral: Negative for depression.      Physical Exam  Laboratory/Imaging   Hemodynamics  Temp (24hrs), Av.7 °C (98.1 °F), Min:36.6 °C (97.8 °F), Max:36.8 °C (98.3 °F)   Temperature: 36.6 °C (97.8 °F)  Pulse  Av  Min: 71  Max: 117    Blood Pressure: 108/65      Respiratory      Respiration: 16, Pulse Oximetry: 94 %             Fluids    Intake/Output Summary (Last 24 hours) at 18 1639  Last data filed at 18 1800   Gross per 24 hour   Intake              120 ml   Output                0 ml   Net              120 ml       Nutrition  Orders Placed This Encounter   Procedures   • Diet Order Regular     Standing Status:   Standing     Number of Occurrences:   1     Order Specific Question:   Diet:     Answer:   Regular [1]     Physical Exam   Constitutional: She is oriented to person, place, and time. She appears cachectic. No distress.    HENT:   Head: Normocephalic and atraumatic.   Eyes: Conjunctivae are normal.   Neck: Normal range of motion. Neck supple. No JVD present.   Cardiovascular: Normal rate, regular rhythm and normal heart sounds.    Pulmonary/Chest: Effort normal. No stridor. No respiratory distress. She has no wheezes. She has rales.   Abdominal: Soft. Bowel sounds are normal. She exhibits no distension. There is no rebound.   Musculoskeletal: Normal range of motion. She exhibits no tenderness.   Lymphadenopathy:     She has no cervical adenopathy.   Neurological: She is alert and oriented to person, place, and time. She exhibits normal muscle tone.   Skin: No erythema.   Psychiatric: She has a normal mood and affect.   Nursing note and vitals reviewed.      Recent Labs      06/29/18   0155  06/29/18   0827  06/30/18   0301   WBC  13.9*  13.9*  17.7*   RBC  3.08*  3.10*  3.31*   HEMOGLOBIN  10.0*  9.9*  10.8*   HEMATOCRIT  31.1*  31.9*  33.3*   MCV  101.0*  102.9*  100.6*   MCH  32.5  31.9  32.6   MCHC  32.2*  31.0*  32.4*   RDW  66.6*  66.6*  67.4*   PLATELETCT  530*  497*  554*   MPV  9.2  9.5  9.1     Recent Labs      06/30/18   1218   SODIUM  134*   POTASSIUM  3.6   CHLORIDE  102   CO2  23   GLUCOSE  99   BUN  5*   CREATININE  0.37*   CALCIUM  8.7                      Assessment/Plan     * Acute encephalopathy   Assessment & Plan    resolved, likely due to uti.          Acute cystitis without hematuria   Assessment & Plan    Likely the primary source of encephalopathy  On levaquin, urine cx neg. Last fever 6/25 at 7AM  D/c levaquin due to increasing wbc probably new pneumonia on cxr.         Cholangiocarcinoma metastatic to liver (HCC)- (present on admission)   Assessment & Plan    Started chemotherapy with Dr Aleman 2 weeks ago.  Will need to f/u with Dr Aleman when d/c.   cmp stable.           HCAP (healthcare-associated pneumonia)   Assessment & Plan    Elevated procalcitonin, cxr showing possible consolidation on LLL, started  on zosyn. f/u blood cx and sputum cx.         Hypokalemia   Assessment & Plan    Resolved.           Anemia associated with chemotherapy   Assessment & Plan    Chemotherapy/cancer induced.  Hb stable. No signs of active bleeding.             Hyponatremia   Assessment & Plan    Resolved with ivf.         Dehydration- (present on admission)   Assessment & Plan    Resolved.          Depression- (present on admission)   Assessment & Plan    on wellbutrin.          Neuropathy (HCC)- (present on admission)   Assessment & Plan    Continue neurontin.            Quality-Core Measures   Reviewed items::  Labs reviewed, Medications reviewed and Radiology images reviewed  DVT prophylaxis pharmacological::  Enoxaparin (Lovenox)  Antibiotics:  Treating active infection/contamination beyond 24 hours perioperative coverage      35 minutes spent on this this encounter by myself today, greater than 50% on counseling and coordination of care, discussing with patient regarding lab finding and answering her questions, reviewing patient's chart and discussing with nurse staff.

## 2018-07-01 NOTE — PROGRESS NOTES
[facility] Hospitalist Progress Note    Date of Service: 2018    Chief Complaint    63 y.o. female admitted 2018 with altered mental status with hallucinations, dehydration and falls.  Recently started chemotherapy for cholangiocarcinoma with Dr Aleman  Interval Problem Update  Patient is feeling ok, tolerating diet, she has RUQ pain with deep inspiration she stated that is around her baseline but she notice worse pain with deep inspiration today, no fever, wbc 48737, procalcitonin stable. Will get new US RUQ today, continue zosyn f/u cx.      Consultants/Specialty  none    Disposition  Home when stable with [facility].          Review of Systems   Constitutional: Negative for fever.   HENT: Negative for nosebleeds.    Respiratory: Negative for cough and hemoptysis.    Cardiovascular: Negative for palpitations and orthopnea.   Gastrointestinal: Positive for abdominal pain (ruq (chronic minimally increased)). Negative for heartburn.   Genitourinary: Negative for dysuria.   Musculoskeletal: Negative for myalgias.   Neurological: Negative for dizziness.   Endo/Heme/Allergies: Does not bruise/bleed easily.   Psychiatric/Behavioral: Negative for depression.      Physical Exam  Laboratory/Imaging   Hemodynamics  Temp (24hrs), Av.8 °C (98.3 °F), Min:36.4 °C (97.5 °F), Max:37.2 °C (98.9 °F)   Temperature: 37.2 °C (98.9 °F)  Pulse  Av.9  Min: 71  Max: 117    Blood Pressure: 103/66      Respiratory      Respiration: 18, Pulse Oximetry: 96 %     Work Of Breathing / Effort: Shallow;Moderate       Fluids    Intake/Output Summary (Last 24 hours) at 18 1616  Last data filed at 18 1547   Gross per 24 hour   Intake              931 ml   Output                0 ml   Net              931 ml       Nutrition  Orders Placed This Encounter   Procedures   • Diet Order Regular     Standing Status:   Standing     Number of Occurrences:   1     Order Specific Question:   Diet:     Answer:   Regular [1]     Physical Exam    Constitutional: She is oriented to person, place, and time. She appears cachectic. No distress.   HENT:   Head: Normocephalic and atraumatic.   Eyes: Conjunctivae are normal.   Neck: Normal range of motion. Neck supple.   Cardiovascular: Normal rate, regular rhythm and normal heart sounds.    Pulmonary/Chest: Effort normal. No stridor. No respiratory distress. She has no wheezes. She has rales.   Abdominal: Soft. Bowel sounds are normal. She exhibits no distension. There is tenderness (RUQ. ). There is no rebound.   Musculoskeletal: Normal range of motion. She exhibits no tenderness.   Neurological: She is alert and oriented to person, place, and time. She exhibits normal muscle tone.   Psychiatric: She has a normal mood and affect.   Nursing note and vitals reviewed.      Recent Labs      06/29/18   0827  06/30/18   0301  07/01/18   0124   WBC  13.9*  17.7*  18.1*   RBC  3.10*  3.31*  2.95*   HEMOGLOBIN  9.9*  10.8*  9.6*   HEMATOCRIT  31.9*  33.3*  29.8*   MCV  102.9*  100.6*  101.0*   MCH  31.9  32.6  32.5   MCHC  31.0*  32.4*  32.2*   RDW  66.6*  67.4*  67.6*   PLATELETCT  497*  554*  438   MPV  9.5  9.1  9.2     Recent Labs      06/30/18   1218   SODIUM  134*   POTASSIUM  3.6   CHLORIDE  102   CO2  23   GLUCOSE  99   BUN  5*   CREATININE  0.37*   CALCIUM  8.7                      Assessment/Plan     * Acute encephalopathy   Assessment & Plan    resolved, likely due to uti.          Acute cystitis without hematuria   Assessment & Plan    Likely the primary source of encephalopathy  On levaquin, urine cx neg. Last fever 6/25 at 7AM  D/c levaquin due to increasing wbc probably new pneumonia on cxr.         Cholangiocarcinoma metastatic to liver (HCC)- (present on admission)   Assessment & Plan    Started chemotherapy with Dr Aleman 2 weeks ago.  Will need to f/u with Dr Aleman when d/c.   cmp stable.   Has RUQ pain stable, worse with deep inspiration due to leukocytosis and pain will get new US liver.            HCAP (healthcare-associated pneumonia)   Assessment & Plan    Continue zosyn procalcitonin and wbc elevated, no fever.         Hypokalemia   Assessment & Plan    Resolved.           Anemia associated with chemotherapy   Assessment & Plan    Chemotherapy/cancer induced.  Hb stable. No signs of active bleeding.             Hyponatremia   Assessment & Plan    Mild continue following.         Dehydration- (present on admission)   Assessment & Plan    Resolved.          Depression- (present on admission)   Assessment & Plan    on wellbutrin.          Neuropathy (HCC)- (present on admission)   Assessment & Plan    Continue neurontin.            Quality-Core Measures   Reviewed items::  Labs reviewed, Medications reviewed and Radiology images reviewed  DVT prophylaxis pharmacological::  Enoxaparin (Lovenox)  Antibiotics:  Treating active infection/contamination beyond 24 hours perioperative coverage

## 2018-07-01 NOTE — CARE PLAN
"Problem: Pain Management  Goal: Pain level will decrease to patient's comfort goal    Intervention: Educate and implement non-pharmacologic comfort measures. Examples: relaxation, distration, play therapy, activity therapy, massage, etc.  Abdomen pain ranges 3-7. Providing regular heat packs as alternative to medication will give some relief.      Problem: Respiratory:  Goal: Respiratory status will improve    Intervention: Educate and encourage incentive spirometry usage  Encouraged use of Incentive Spirometer. Pt complied to best of her ability, stating it \"makes the pain in my abdomen worse\". Pt able to achieve 750 on IS. Will continue to encourage use of IS.        "

## 2018-07-01 NOTE — CARE PLAN
Problem: Safety  Goal: Will remain free from injury  Outcome: PROGRESSING AS EXPECTED    Intervention: Provide assistance with mobility  Patient is SBA to bathroom with front-wheel walker but requires assistance x1 standing from toilet.  Will order raised toilet for patient.        Problem: Pain Management  Goal: Pain level will decrease to patient's comfort goal    Intervention: Educate and implement non-pharmacologic comfort measures. Examples: relaxation, distration, play therapy, activity therapy, massage, etc.  Heat packs provided and patient engaged in conversation/distraction and allowed to voice feelings.

## 2018-07-02 NOTE — CARE PLAN
Problem: Infection  Goal: Will remain free from infection  Outcome: PROGRESSING AS EXPECTED  IV abx. Standard precautions in place.     Problem: Bowel/Gastric:  Goal: Normal bowel function is maintained or improved  Outcome: PROGRESSING SLOWER THAN EXPECTED  Pt complains about nausea. Zofran given.     Problem: Pain Management  Goal: Pain level will decrease to patient's comfort goal  Outcome: PROGRESSING SLOWER THAN EXPECTED  Pt complains about pain. Med per MAR.     Problem: Mobility  Goal: Risk for activity intolerance will decrease  Outcome: PROGRESSING AS EXPECTED  Pt up for meals. Pt up with standby assist.

## 2018-07-02 NOTE — PROGRESS NOTES
Assumed care of pt @0700. Bedside report received. Pt AOX 4. Pt complains about pain. Roxicodone 5 mg and Dilaudid 0.25 mg given. Pt complains about nausea. Zofran given. Port running IV abx. Last Bm 06/29. Pt passes gas. Pt refuses Miralax, MOM and suppository. Pt up with standby assist. Fall precaution in place. POC discussed with pt, all questions answered at this time. Pt makes needs known, call light within reach, hourly rounding in place.

## 2018-07-02 NOTE — PROGRESS NOTES
[facility] Hospitalist Progress Note    Date of Service: 2018    Chief Complaint    63 y.o. female admitted 2018 with altered mental status with hallucinations, dehydration and falls.  Recently started chemotherapy for cholangiocarcinoma with Dr Aleman  Interval Problem Update  In bed, no fever, feels ok, RUQ pain stable. ID consulted. s/p thoracentesis.  Tolerating diet.      Consultants/Specialty  ID    Disposition  Home when stable with [facility].          Review of Systems   Constitutional: Negative for fever.   HENT: Negative for nosebleeds.    Respiratory: Negative for cough.    Cardiovascular: Negative for palpitations.   Gastrointestinal: Positive for abdominal pain (ruq (chronic minimally increased)). Negative for heartburn.   Genitourinary: Negative for dysuria.   Musculoskeletal: Negative for myalgias.   Neurological: Negative for dizziness.   Endo/Heme/Allergies: Does not bruise/bleed easily.   Psychiatric/Behavioral: Negative for depression.      Physical Exam  Laboratory/Imaging   Hemodynamics  Temp (24hrs), Av.8 °C (98.3 °F), Min:36.3 °C (97.4 °F), Max:37.2 °C (98.9 °F)   Temperature: 37.1 °C (98.8 °F)  Pulse  Av.2  Min: 71  Max: 117    Blood Pressure: 115/55      Respiratory      Respiration: 17, Pulse Oximetry: 96 %     Work Of Breathing / Effort: Shallow       Fluids    Intake/Output Summary (Last 24 hours) at 18 1640  Last data filed at 18 1338   Gross per 24 hour   Intake              780 ml   Output              360 ml   Net              420 ml       Nutrition  Orders Placed This Encounter   Procedures   • Diet Order Regular     Standing Status:   Standing     Number of Occurrences:   1     Order Specific Question:   Diet:     Answer:   Regular [1]     Physical Exam   Constitutional: She is oriented to person, place, and time. She appears cachectic. No distress.   HENT:   Head: Normocephalic and atraumatic.   Eyes: Conjunctivae are normal. No scleral icterus.   Neck: Normal  range of motion. Neck supple.   Cardiovascular: Normal rate, regular rhythm and normal heart sounds.    Pulmonary/Chest: Effort normal. No respiratory distress. She has no wheezes. She has rales.   Abdominal: Soft. Bowel sounds are normal. She exhibits no distension. There is tenderness (RUQ. ). There is no rebound.   Musculoskeletal: Normal range of motion. She exhibits no tenderness.   Neurological: She is alert and oriented to person, place, and time. She exhibits normal muscle tone.   Psychiatric: She has a normal mood and affect.   Nursing note and vitals reviewed.      Recent Labs      06/30/18   0301  07/01/18   0124  07/02/18   0112   WBC  17.7*  18.1*  18.9*   RBC  3.31*  2.95*  2.95*   HEMOGLOBIN  10.8*  9.6*  9.4*   HEMATOCRIT  33.3*  29.8*  30.1*   MCV  100.6*  101.0*  102.0*   MCH  32.6  32.5  31.9   MCHC  32.4*  32.2*  31.2*   RDW  67.4*  67.6*  68.8*   PLATELETCT  554*  438  386   MPV  9.1  9.2  9.3     Recent Labs      06/30/18   1218  07/02/18   0112   SODIUM  134*  133*   POTASSIUM  3.6  3.0*   CHLORIDE  102  102   CO2  23  23   GLUCOSE  99  112*   BUN  5*  4*   CREATININE  0.37*  0.42*   CALCIUM  8.7  8.3*     Recent Labs      07/02/18   1030   APTT  27.3   INR  1.33*                  Assessment/Plan     * Acute encephalopathy   Assessment & Plan    resolved, likely due to uti.          Acute cystitis without hematuria   Assessment & Plan    Likely the primary source of encephalopathy  On levaquin, urine cx neg. Last fever 6/25 at 7AM  Resolved.         Cholangiocarcinoma metastatic to liver (HCC)- (present on admission)   Assessment & Plan    Started chemotherapy with Dr Aleman 2 weeks ago.  Will need to f/u with Dr Aleman when d/c.   cmp stable.   RUQ showed Enlarged liver which is diffusely heterogeneous This could represent diffuse metastatic involvement or spread of cholangiocarcinoma, discussed with Dr Conway oncology and recommended to continue monitoring but at this time need to focus on  infectious process.         HCAP (healthcare-associated pneumonia)   Assessment & Plan    Continue zosyn procalcitonin and wbc elevated, no fever.   Consulted ID,   s/p thoracentesis, f/u fluid analysis and cx.         Hypokalemia   Assessment & Plan    Replacing.           Anemia associated with chemotherapy   Assessment & Plan    Chemotherapy/cancer induced.  Hb stable. No signs of active bleeding            Hyponatremia   Assessment & Plan    Stable continue monitoring. 133 today.         Dehydration- (present on admission)   Assessment & Plan    Resolved.          Depression- (present on admission)   Assessment & Plan    on wellbutrin.          Neuropathy (HCC)- (present on admission)   Assessment & Plan    Continue neurontin.            Quality-Core Measures   Reviewed items::  Labs reviewed, Medications reviewed and Radiology images reviewed  DVT prophylaxis pharmacological::  Enoxaparin (Lovenox)  Antibiotics:  Treating active infection/contamination beyond 24 hours perioperative coverage      36 minutes spent on this this encounter by myself today, greater than 50% on counseling and coordination of care, discussing with ID, discussing with pharmacist, discussing with patient about plan of care.

## 2018-07-02 NOTE — CARE PLAN
Problem: Safety  Goal: Will remain free from injury  Outcome: PROGRESSING AS EXPECTED    Intervention: Educate patient and significant other/support system about adaptive mobility strategies and safe transfers  Raised toilet seat provided for patient to facilitate independence toileting.  Patient able to stand independently from toilet with raised seat in place.        Problem: Respiratory:  Goal: Respiratory status will improve  Outcome: PROGRESSING AS EXPECTED    Intervention: Educate and encourage incentive spirometry usage  Patient educated and encouraged to continue incentive spirometry use 10 times hourly when awake.

## 2018-07-02 NOTE — PROGRESS NOTES
Received report from day RN and assumed care of patient at 1900.  Patient is resting comfortably in bed, up to toilet SBA with FWW and back to bed..  Daughter-in-law at bedside.  POC discussed.  All needs met at this time.  Patient educated to call for assistance and verbalizes understanding.

## 2018-07-02 NOTE — PROGRESS NOTES
Pt to have US of Liver and biliary tree Monday. NPO after midnight tonight. Pt instructed by MD to ambulate each day and use the incentive spirometer  Hourly. Pt is attempting to comply with these instructions. Pt A&0x4, call light in reach, bed low and locked, bed alarm on, hourly rounding performed.

## 2018-07-03 NOTE — PROGRESS NOTES
Report received from Day RN and assumed care at 1915. Patient is A&O x4, resting in bed. Bed alarm on. Patient on 1 L oxygen via [place], . Patient calls for assistance.     Patient reports 9/10 abdomen pain, medicated with PRN oxycodone 5 mg. Patient denies nausea.     Port intact and flushes, with + blood return. Right PIV intact and flushes, saline locked.     POC discussed. All needs met at this time. Call light within reach. Bed locked, in lowest position. Hourly rounding in place.

## 2018-07-03 NOTE — CARE PLAN
Problem: Safety  Goal: Will remain free from falls  Bed locked in lowest position. Bed alarm on. Call light within reach. Hourly rounding in place.

## 2018-07-03 NOTE — PROGRESS NOTES
Infectious Disease Progress Note    Author: Yenifer Blackburn M.D. Date & Time of service: 7/3/2018  3:47 PM    Chief Complaint:  Leukocytosis    Interval History:  7/3/2018 T-max 98.4 WBC 22.3 platelets 425 creatinine 0.56  Labs Reviewed, Medications Reviewed and Radiology Reviewed.    Review of Systems:  Review of Systems   Constitutional: Positive for malaise/fatigue. Negative for fever.        Worried about her weight gain   HENT: Negative for hearing loss.    Respiratory: Positive for shortness of breath.    Cardiovascular: Positive for leg swelling. Negative for chest pain.   Gastrointestinal: Positive for abdominal pain. Negative for diarrhea and nausea.   Genitourinary: Negative for dysuria.   Neurological: Positive for weakness. Negative for sensory change and speech change.       Hemodynamics:  Temp (24hrs), Av.7 °C (98 °F), Min:36.6 °C (97.8 °F), Max:36.9 °C (98.4 °F)  Temperature: 36.6 °C (97.9 °F)  Pulse  Av.3  Min: 71  Max: 117   Blood Pressure: 110/50       Physical Exam:  Physical Exam   Constitutional: She is oriented to person, place, and time.   Looks chronically ill   HENT:   Mouth/Throat: No oropharyngeal exudate.   Edentulous   Eyes: No scleral icterus.   Neck: Neck supple.   Cardiovascular: Regular rhythm.    No murmur heard.  Pulmonary/Chest: She has no wheezes. She has no rales.   Abdominal: She exhibits distension. There is tenderness.   Musculoskeletal: She exhibits edema.   Neurological: She is alert and oriented to person, place, and time.   Vitals reviewed.      Meds:    Current Facility-Administered Medications:   •  [COMPLETED] piperacillin-tazobactam **AND** piperacillin-tazobactam  •  acyclovir  •  buPROPion  •  gabapentin  •  morphine ER  •  senna-docusate **AND** polyethylene glycol/lytes **AND** magnesium hydroxide **AND** bisacodyl  •  labetalol  •  ondansetron  •  ondansetron  •  promethazine  •  promethazine  •  prochlorperazine  •  acetaminophen  •  Notify provider if  pain remains uncontrolled **AND** Use the numeric rating scale (NRS-11) on regular floors and Critical-Care Pain Observation Tool (CPOT) on ICUs/Trauma to assess pain **AND** Pulse Ox (Oximetry) **AND** Pharmacy Consult Request **AND** If patient difficult to arouse and/or has respiratory depression, stop any opiates that are currently infusing and call a Rapid Response. **AND** oxyCODONE immediate-release **AND** oxyCODONE immediate-release **AND** HYDROmorphone  •  enoxaparin (LOVENOX) injection    Labs:  Recent Labs      07/01/18   0124  07/02/18   0112  07/03/18   0347   WBC  18.1*  18.9*  22.3*   RBC  2.95*  2.95*  2.96*   HEMOGLOBIN  9.6*  9.4*  9.3*   HEMATOCRIT  29.8*  30.1*  30.6*   MCV  101.0*  102.0*  103.4*   MCH  32.5  31.9  31.4   RDW  67.6*  68.8*  71.7*   PLATELETCT  438  386  425   MPV  9.2  9.3  9.6   NEUTSPOLYS  85.10*  78.40*  81.00*   LYMPHOCYTES  5.30*  7.80*  6.80*   MONOCYTES  6.10  8.80  8.30   EOSINOPHILS  0.90  0.70  0.70   BASOPHILS  1.70  0.70  0.80   RBCMORPHOLO  Present   --    --      Recent Labs      07/02/18   0112  07/03/18   0347   SODIUM  133*  136   POTASSIUM  3.0*  3.5*   CHLORIDE  102  103   CO2  23  24   GLUCOSE  112*  96   BUN  4*  4*     Recent Labs      07/02/18   0112  07/03/18   0347   CREATININE  0.42*  0.56       Imaging:  Ct-chest (thorax) W/o    Result Date: 7/2/2018 7/2/2018 6:10 PM HISTORY/REASON FOR EXAM:  Abnormal chest x-ray, metastatic cholangiocarcinoma. TECHNIQUE/EXAM DESCRIPTION: CT scan of the chest without contrast. Noncontrast helical scanning of the chest was obtained from the lung apices through the adrenal glands. Low dose optimization technique was utilized for this CT exam including automated exposure control and adjustment of the mA and/or kV according to patient size. COMPARISON: Chest x-ray 7/2/2018, PET/CT 5/21/2018 FINDINGS: Thoracic aorta is normal in caliber. Coronary artery calcifications. RIGHT chest infusion port. Small bilateral pleural  effusions, slightly larger on the RIGHT. Mild emphysematous changes. Ill-defined opacity of the LEFT lung base, primarily LEFT lower lobe. Bibasilar atelectasis. No pneumothorax. Visualized liver is enlarged and shows numerous low-density lesions consistent with metastasis.     1.  Small bilateral pleural effusions with associated atelectasis. 2.  Findings concerning for superimposed LEFT lower lobe pneumonia. 3.  Mild emphysema. 4.  No pneumothorax. 5.  Extensive hepatic metastatic disease again seen.     Ct-head W/o    Result Date: 6/23/2018 6/23/2018 2:34 AM HISTORY/REASON FOR EXAM:  Head Injury Ground-level fall TECHNIQUE/EXAM DESCRIPTION AND NUMBER OF VIEWS: CT of the head without contrast. The study was performed on a helical multidetector CT scanner. Contiguous 2.5 mm axial sections were obtained from the skull base through the vertex. Up to date radiation dose reduction adjustments have been utilized to meet ALARA standards for radiation dose reduction. COMPARISON:  None available FINDINGS: There is no evidence of acute intracranial hemorrhage, mass, mass-effect or shift of midline structures. Gray-white matter differentiation is grossly preserved. Ventricle size and brain parenchymal volume are appropriate for this patient's stated age. The basal cisterns are patent. There are no abnormal extra-axial fluid collections. No depressed or widely  calvarial fracture is seen. The visualized paranasal sinuses and temporal bone structures are aerated. ___________________________________     1. No acute intracranial abnormality. No evidence of acute intracranial hemorrhage or mass lesion.     Dx-chest-2 Views    Result Date: 6/30/2018 6/30/2018 11:49 AM HISTORY/REASON FOR EXAM: Right epigastric pain, liver carcinoma. TECHNIQUE/EXAM DESCRIPTION AND NUMBER OF VIEWS: Two views of the chest. COMPARISON:  June 23 FINDINGS: Right internal jugular leonardo catheter tip overlies the SVC. Cardiomediastinal contours  are stable given new left subpulmonic pleural fluid and/or basilar atelectasis/consolidation which effaces the left heart border. Diminished right lung volume with possible subpulmonic fluid. Slight patchy opacity above the level of the leonardo catheter laterally Decreasing lung volumes     Decreasing lung volumes with possible new subpulmonic fluid especially in the left and there is adjacent atelectasis or consolidation    Dx-chest-2 Views    Result Date: 6/4/2018 6/4/2018 5:13 PM HISTORY/REASON FOR EXAM:  Increasing weakness. Metastatic ovarian cancer. TECHNIQUE/EXAM DESCRIPTION AND NUMBER OF VIEWS: Two views of the chest. COMPARISON:  5/9/2018 FINDINGS: There is a Port-A-Cath in place with the tip projecting over the SVC. Port projects over the right chest wall. The mediastinal and cardiac silhouette is unremarkable. The pulmonary vascularity is within normal limits. There is minimal linear opacity in the left lower lobe. There is a small amount of bilateral dependent pleural fluid. There is no visible pneumothorax. There are no acute bony abnormalities.     1.  There is no significant change in linear left lower lobe atelectasis or pneumonitis with a trace of adjacent fluid. 2.  There is a trace of right CP angle fluid. Findings were discussed with JEANINE VLEEZ on 6/4/2018 at 5:34 PM.    Dx-chest-limited (1 View)    Result Date: 7/2/2018 7/2/2018 1:35 PM HISTORY/REASON FOR EXAM:  Status post left thoracentesis. Evaluate for pneumothorax. TECHNIQUE/EXAM DESCRIPTION AND NUMBER OF VIEWS: Single AP view of the chest. COMPARISON: 6/30/2018 FINDINGS: Lines/tubes:  Right IJV Port-A-Cath remains in place.. Lungs:  No pulmonary infiltrates or consolidations are noted. Pleura:  No pneumothorax is identified following left thoracentesis. No significant residual left pleural effusion is identified. Heart and mediastinum:  The heart silhouette is within normal limits. Bones:  Negative.     No pneumothorax following  left thoracentesis.    Dx-chest-portable (1 View)    Result Date: 6/23/2018 6/23/2018 2:21 AM HISTORY/REASON FOR EXAM:  Altered mental status TECHNIQUE/EXAM DESCRIPTION AND NUMBER OF VIEWS: Single portable view of the chest. COMPARISON: 6/4/2018 FINDINGS: Right chest port in place. No pulmonary infiltrates or consolidations are noted. No pleural effusion. No pneumothorax. Stable cardiopericardial silhouette.     1. No acute cardiopulmonary abnormalities are identified.    Mr-brain-with & W/o    Result Date: 6/5/2018 6/4/2018 4:58 PM HISTORY/REASON FOR EXAM:  Evaluate for metastasis. TECHNIQUE/EXAM DESCRIPTION:   MRI of the brain without and with contrast. T1 sagittal, T2 fast spin-echo axial, T1 coronal, FLAIR coronal, diffusion-weighted and apparent diffusion coefficient (ADC map) axial images were obtained of the whole brain. T1 postcontrast axial and T1 postcontrast coronal images were obtained. The study was performed on a XZERES Signa 1.5 Isabell MRI scanner. 12 mL Omniscan contrast was administered intravenously. COMPARISON:  2/24/2016 FINDINGS: There is no evidence of abnormal meningeal or parenchymal enhancing lesion. The skull bones appear normal. The extracranial soft tissue including orbits appear grossly normal.   There is no large lesion identified in the expected course of the intracranial portions of the cranial nerves. There is no acute infarct. There is no acute or chronic parenchymal hemorrhage. A few nonspecific T2 hyperintensities are noted in the supratentorial white matter and brainstem. There is no intra-axial space-occupying lesion. There is no sellar or juxtasellar lesion. There is no extra-axial fluid collection, hemorrhage or mass. The visualized flow voids of the cerebral vasculature are unremarkable.     1.  There is no evidence of intracranial metastasis. 2.  Mild cerebral atrophy. 3.  Mild chronic microvascular ischemic disease. 4.  There has been no significant interval  change.    Us-gallbladder    Result Date: 7/2/2018 7/2/2018 6:08 AM HISTORY/REASON FOR EXAM:  Leukocytosis. History of cholangiocarcinoma. TECHNIQUE/EXAM DESCRIPTION AND NUMBER OF VIEWS:  Real-time sonography of the gallbladder. COMPARISON: 11/9/2017 FINDINGS: There is no ascites. The liver is normal in contour. The solid mass is present in the inferior aspect of the right lobe of the liver laterally measuring 2.3 x 1.5 x 2.1 cm. This may represent residual or recurrent hepatic neoplasm. The major of the liver is diffusely heterogeneous and diffuse hepatic metastatic involvement cannot be excluded. The liver is enlarged and measures 26.24 cm. The gallbladder contains sludge and a polyp. No stones are identified. The gallbladder wall thickness measures 0.29 cm There is no pericholecystic fluid. The common duct measures 0.41 cm. The visualized pancreas is unremarkable. The visualized aorta is normal in caliber. Intrahepatic IVC is patent. The portal vein is patent with hepatopetal flow. The right kidney measures 13.23 cm. Several simple renal cysts are present. The largest is in the upper pole measuring 2.1 cm in diameter.     1.  Enlarged liver which is diffusely heterogeneous. This could represent diffuse metastatic involvement or spread of cholangiocarcinoma. One residual round mass is identified in the inferior aspect of the right lobe of liver measuring 2.3 cm in diameter. 2.  Sludge and polyp within the gallbladder. No cholelithiasis or biliary dilatation. 3.  Pancreas not visualized. 4.  No right hydronephrosis. 5.  No ascites or portal venous flow reversal.    Us-thoracentesis Puncture Left    Result Date: 7/2/2018 7/2/2018 12:56 PM HISTORY/REASON FOR EXAM:  Left pleural effusion TECHNIQUE/EXAM DESCRIPTION: Ultrasound-guided thoracentesis. Indication:  LEFT pleural fluid collection. COMPARISON:  None PROCEDURE:     Informed consent was obtained. A timeout was taken. A left pleural effusion was localized with  real-time ultrasound guidance. The left posterior chest wall was prepped and draped in a sterile manner. Following local anesthesia with 1% lidocaine, a 5 [place] Yueh pigtail catheter was advanced into the pleural space with trocar technique and pleural fluid was drained. The patient tolerated the procedure well without evidence of complication. A post thoracentesis chest radiograph is forthcoming. FINDINGS: Left pleural effusion Fluid was sent to the laboratory. 360 mL sent to laboratory for analysis Fluid character: straw colored     1. Ultrasound guided left sided diagnostic and therapeutic thoracentesis. 2. 360 mL of fluid withdrawn.      Micro:  Results     Procedure Component Value Units Date/Time    FLUID CULTURE W/GRAM STAIN [[id number]] Collected:  07/02/18 1335    Order Status:  Completed Specimen:  Body Fluid Updated:  07/03/18 1429     Significant Indicator NEG     Source BF     Site Pleural Fluid/left     Culture Result Bdf No growth at 24 hours.     Gram Stain Result No organisms seen.    Narrative:       Lt pleural fluid    GRAM STAIN [[id number]] Collected:  07/02/18 1335    Order Status:  Completed Specimen:  Body Fluid Updated:  07/02/18 2321     Significant Indicator .     Source BF     Site Pleural Fluid/left     Gram Stain Result No organisms seen.    Narrative:       Lt pleural fluid    MRSA BY PCR (AMP) [[id number]] Collected:  07/02/18 1427    Order Status:  Completed Specimen:  Respirate from Nares Updated:  07/02/18 1640     Significant Indicator NEG     Source RESP     Site NARES     MRSA PCR Negative for MRSA by PCR.    Narrative:       Collected By:[id number] FLASH CONN    FUNGAL CULTURE [[id number]] Collected:  07/02/18 1335    Order Status:  Completed Specimen:  Other Updated:  07/02/18 1504    Narrative:       Lt pleural fluid    FLUID CULTURE W/GRAM STAIN [[id number]]     Order Status:  No result Specimen:  Body Fluid from Thoracentesis Fluid     FUNGAL CULTURE [[id number]]      "Order Status:  No result Specimen:  Body Fluid from Thoracentesis Fluid     BLOOD CULTURE [[id number]] Collected:  06/30/18 0837    Order Status:  Completed Specimen:  Blood from Peripheral Updated:  07/01/18 0718     Significant Indicator NEG     Source BLD     Site PERIPHERAL     Blood Culture No Growth    Note: Blood cultures are incubated for 5 days and  are monitored continuously.Positive blood cultures  are called to the RN and reported as soon as  they are identified.      Narrative:       Per Hospital Policy: Only change Specimen Src: to \"Line\" if  specified by physician order.    BLOOD CULTURE [[id number]] Collected:  06/30/18 0837    Order Status:  Completed Specimen:  Blood from Peripheral Updated:  07/01/18 0718     Significant Indicator NEG     Source BLD     Site PERIPHERAL     Blood Culture No Growth    Note: Blood cultures are incubated for 5 days and  are monitored continuously.Positive blood cultures  are called to the RN and reported as soon as  they are identified.      Narrative:       Per Hospital Policy: Only change Specimen Src: to \"Line\" if  specified by physician order.    CULTURE RESP W/O GRAM STAIN [[id number]]     Order Status:  Completed Specimen:  Respirate from Sputum     URINALYSIS [[id number]]  (Abnormal) Collected:  06/30/18 0950    Order Status:  Completed Specimen:  Urine from Urine, Clean Catch Updated:  06/30/18 1009     Color DK Yellow     Character Turbid (A)     Specific Gravity 1.021     Ph 5.0     Glucose Negative mg/dL      Ketones Trace (A) mg/dL      Protein 30 (A) mg/dL      Bilirubin Small (A)     Urobilinogen, Urine 1.0     Nitrite Negative     Leukocyte Esterase Trace (A)     Occult Blood Negative     Micro Urine Req Microscopic    Narrative:       Collected By:[id number] TRISHA ANGUIANO    BLOOD CULTURE [[id number]] Collected:  06/23/18 0312    Order Status:  Completed Specimen:  Blood from Peripheral Updated:  06/28/18 0500     Significant Indicator NEG     Source BLD    "  Site PERIPHERAL     Blood Culture No growth after 5 days of incubation.    Narrative:       2 of 2 blood culture x2  Sites order    BLOOD CULTURE [[id number]] Collected:  06/23/18 0204    Order Status:  Completed Specimen:  Blood from Peripheral Updated:  06/28/18 0300     Significant Indicator NEG     Source BLD     Site PERIPHERAL     Blood Culture No growth after 5 days of incubation.    Narrative:       1 of 2 for Blood Culture x 2 sites order          Assessment:  Active Hospital Problems    Diagnosis   • *Acute encephalopathy [G93.40]   • Acute cystitis without hematuria [N30.00]   • Cholangiocarcinoma metastatic to liver (HCC) [C22.1, C78.7]   • HCAP (healthcare-associated pneumonia) [J18.9]   • Hypokalemia [E87.6]   • Hyponatremia [E87.1]   • Anemia associated with chemotherapy [D64.81, T45.1X5A]   • Dehydration [E86.0]   • Depression [F32.9]   • Neuropathy (HCC) [G62.9]       Plan:  Pneumonia  The CT scan done on 7/2/2018 showed left lower lobe pneumonia  Currently on Zosyn  The pleural fluid was drained and culture is negative-follow the pathology    Leukocytosis  Multifactorial  Pneumonia as well as metastatic disease playing a role likely    Metastatic cholangiocarcinoma    Prognosis  Guarded  Discussed with internal medicine.

## 2018-07-04 PROBLEM — R60.0 BILATERAL LEG EDEMA: Status: ACTIVE | Noted: 2018-01-01

## 2018-07-04 NOTE — ASSESSMENT & PLAN NOTE
Chronic issue, improving with adjustment of medications  BP normalized with stopping diuresis  CT chest/abdomen/pelvis with PO contrast only d/t iodine allergy - no new findings, liver still riddled with tumor but no sign of infection/abscess  MS contin increase from 30 to 60 she states she has much better pain control  Oxycodone PRN increased 10-15 PRN  Dilaudid iv increased 0.5-1mg PRN

## 2018-07-04 NOTE — PROGRESS NOTES
Report received from Day RN and assumed care at 1915. Patient is A&O x4, resting in bed. Bed alarm on. Patient on 1 L oxygen via [place]. Patient calls for assistance.      Patient reports 9/10 abdomen pain, medicated with PRN dilaudid. Patient denies nausea.      Port intact and flushes, with + blood return. Right PIV intact and flushes, saline locked.      POC discussed. All needs met at this time. Call light within reach. Bed locked, in lowest position. Hourly rounding in place.

## 2018-07-04 NOTE — PROGRESS NOTES
[facility]    Date of Service: 7/3/2018    Chief Complaint  63 y.o. female admitted 2018 with altered mental status, dehydration, UTI and PNA with known cholangiocarcinoma.    Interval Problem Update  Patient states she feels much better than earlier in her admission.  Her mentation is back to baseline and she denies any shortness of breath.  360cc thoracentesis yesterday without significant change in her breathing per patient.  Continue IV abx.    Consultants/Specialty  ID - Alexey    Disposition  Home with HH when appropriate.        Review of Systems   Constitutional: Negative for chills and fever.   HENT: Negative for congestion.    Eyes: Negative for blurred vision and photophobia.   Respiratory: Negative for cough and shortness of breath.    Cardiovascular: Negative for chest pain, claudication and leg swelling.   Gastrointestinal: Negative for abdominal pain, constipation, diarrhea, heartburn and vomiting.   Genitourinary: Negative for dysuria and hematuria.   Musculoskeletal: Negative for joint pain and myalgias.   Skin: Negative for itching and rash.   Neurological: Negative for dizziness, sensory change, speech change, weakness and headaches.   Psychiatric/Behavioral: Negative for depression. The patient is not nervous/anxious and does not have insomnia.       Physical Exam  Laboratory/Imaging   Hemodynamics  Temp (24hrs), Av.7 °C (98 °F), Min:36.6 °C (97.8 °F), Max:36.9 °C (98.4 °F)   Temperature: 36.6 °C (97.9 °F)  Pulse  Av.3  Min: 71  Max: 117    Blood Pressure: 110/50      Respiratory      Respiration: 15, Pulse Oximetry: 93 %     Work Of Breathing / Effort: Shallow       Fluids  No intake or output data in the 24 hours ending 18 1802    Nutrition  Orders Placed This Encounter   Procedures   • Diet Order Regular     Standing Status:   Standing     Number of Occurrences:   1     Order Specific Question:   Diet:     Answer:   Regular [1]     Physical Exam    Constitutional: She is oriented to person, place, and time. She appears well-developed and well-nourished. No distress.   HENT:   Head: Normocephalic and atraumatic.   Eyes: Conjunctivae are normal. No scleral icterus.   Neck: Neck supple. No JVD present.   Cardiovascular: Normal rate, regular rhythm and normal heart sounds.  Exam reveals no gallop and no friction rub.    No murmur heard.  Pulmonary/Chest: Effort normal and breath sounds normal. No respiratory distress. She exhibits no tenderness.   Decreased at the bases     Abdominal: Soft. Bowel sounds are normal. She exhibits no distension. There is no guarding.   Musculoskeletal: She exhibits no edema or tenderness.   Neurological: She is alert and oriented to person, place, and time. No cranial nerve deficit.   Skin: Skin is warm and dry. She is not diaphoretic. No erythema. No pallor.   Psychiatric: She has a normal mood and affect. Her behavior is normal.   Nursing note and vitals reviewed.      Recent Labs      07/01/18   0124  07/02/18   0112  07/03/18   0347   WBC  18.1*  18.9*  22.3*   RBC  2.95*  2.95*  2.96*   HEMOGLOBIN  9.6*  9.4*  9.3*   HEMATOCRIT  29.8*  30.1*  30.6*   MCV  101.0*  102.0*  103.4*   MCH  32.5  31.9  31.4   MCHC  32.2*  31.2*  30.4*   RDW  67.6*  68.8*  71.7*   PLATELETCT  438  386  425   MPV  9.2  9.3  9.6     Recent Labs      07/02/18   0112  07/03/18   0347   SODIUM  133*  136   POTASSIUM  3.0*  3.5*   CHLORIDE  102  103   CO2  23  24   GLUCOSE  112*  96   BUN  4*  4*   CREATININE  0.42*  0.56   CALCIUM  8.3*  8.4*     Recent Labs      07/02/18   1030   APTT  27.3   INR  1.33*                  Assessment/Plan     * Acute encephalopathy   Assessment & Plan    resolved, likely due to infection.          Acute cystitis without hematuria   Assessment & Plan    Likely the primary source of encephalopathy  Completed 6 days of levaquin, urine cx neg.        Cholangiocarcinoma metastatic to liver (HCC)- (present on admission)    Assessment & Plan    Started chemotherapy with Dr Aleman 3 weeks ago.  Will need to f/u with Dr Aleman when d/c.           Intractable abdominal pain- (present on admission)   Assessment & Plan    Chronic issue  Responding well to current regimen.          HCAP (healthcare-associated pneumonia)   Assessment & Plan    Zosyn  ID consultation  s/p thoracentesis 7/2/18 with 360cc removed  Culture from this pending  Increased WBC following thoracentesis - likely reactive rather than worsening infection.        Hypokalemia   Assessment & Plan    Replacing as needed          Anemia associated with chemotherapy   Assessment & Plan    Chemotherapy/cancer induced.  Hb stable. No signs of active bleeding            Hyponatremia   Assessment & Plan    Resolved          Dehydration- (present on admission)   Assessment & Plan    Resolved.          Depression- (present on admission)   Assessment & Plan    on wellbutrin.          Neuropathy (HCC)- (present on admission)   Assessment & Plan    Continue neurontin.            Quality-Core Measures   Reviewed items::  Labs reviewed, Medications reviewed and Radiology images reviewed  Duarte catheter::  No Duarte  DVT prophylaxis pharmacological::  Enoxaparin (Lovenox)  Antibiotics:  Treating active infection/contamination beyond 24 hours perioperative coverage

## 2018-07-04 NOTE — ASSESSMENT & PLAN NOTE
20# weight gain since admission including BLE edema along with abdomen  Improved, off lasix now due to dropping bp.    7/10: will administer lasix with albumin  7/11: continue lasix, albumin

## 2018-07-04 NOTE — PROGRESS NOTES
Infectious Disease Progress Note    Author: Yenifer Blackburn M.D. Date & Time of service: 2018  2:33 PM    Chief Complaint:  Leukocytosis    Interval History:  7/3/2018 T-max 98.4 WBC 22.3 platelets 425 creatinine 0.56  2018 T-max 98.3 overall feels better.  WBC 21.8 platelets 337 and 0.56  Labs Reviewed, Medications Reviewed and Radiology Reviewed.    Review of Systems:  Review of Systems   Constitutional: Positive for malaise/fatigue. Negative for fever.        Worried about her weight gain   HENT: Negative for hearing loss.    Respiratory: Negative for shortness of breath.    Cardiovascular: Positive for leg swelling. Negative for chest pain.   Gastrointestinal: Positive for abdominal pain. Negative for diarrhea and nausea.   Genitourinary: Negative for dysuria.   Neurological: Positive for weakness. Negative for sensory change and speech change.       Hemodynamics:  Temp (24hrs), Av.6 °C (97.9 °F), Min:36.4 °C (97.6 °F), Max:36.8 °C (98.3 °F)  Temperature: 36.6 °C (97.8 °F)  Pulse  Av  Min: 67  Max: 117   Blood Pressure: (!) 97/68       Physical Exam:  Physical Exam   Constitutional: She is oriented to person, place, and time.   Looks chronically ill   HENT:   Mouth/Throat: No oropharyngeal exudate.   Edentulous   Eyes: No scleral icterus.   Neck: Neck supple.   Cardiovascular: Regular rhythm.    No murmur heard.  Pulmonary/Chest: She has no wheezes. She has no rales.   Abdominal: She exhibits distension. There is tenderness.   Musculoskeletal: She exhibits edema.   Neurological: She is alert and oriented to person, place, and time.   Vitals reviewed.      Meds:    Current Facility-Administered Medications:   •  furosemide  •  [COMPLETED] piperacillin-tazobactam **AND** piperacillin-tazobactam  •  acyclovir  •  buPROPion  •  gabapentin  •  morphine ER  •  senna-docusate **AND** polyethylene glycol/lytes **AND** magnesium hydroxide **AND** bisacodyl  •  labetalol  •  ondansetron  •   ondansetron  •  promethazine  •  promethazine  •  prochlorperazine  •  acetaminophen  •  Notify provider if pain remains uncontrolled **AND** Use the numeric rating scale (NRS-11) on regular floors and Critical-Care Pain Observation Tool (CPOT) on ICUs/Trauma to assess pain **AND** Pulse Ox (Oximetry) **AND** Pharmacy Consult Request **AND** If patient difficult to arouse and/or has respiratory depression, stop any opiates that are currently infusing and call a Rapid Response. **AND** oxyCODONE immediate-release **AND** oxyCODONE immediate-release **AND** HYDROmorphone  •  enoxaparin (LOVENOX) injection    Labs:  Recent Labs      07/02/18 0112 07/03/18 0347 07/04/18   0841   WBC  18.9*  22.3*  21.8*   RBC  2.95*  2.96*  2.85*   HEMOGLOBIN  9.4*  9.3*  9.4*   HEMATOCRIT  30.1*  30.6*  29.1*   MCV  102.0*  103.4*  102.1*   MCH  31.9  31.4  33.0   RDW  68.8*  71.7*  72.2*   PLATELETCT  386  425  337   MPV  9.3  9.6  9.4   NEUTSPOLYS  78.40*  81.00*  81.50*   LYMPHOCYTES  7.80*  6.80*  5.90*   MONOCYTES  8.80  8.30  3.40   EOSINOPHILS  0.70  0.70  0.00   BASOPHILS  0.70  0.80  3.40*   RBCMORPHOLO   --    --   Present     Recent Labs      07/02/18 0112 07/03/18   0347   SODIUM  133*  136   POTASSIUM  3.0*  3.5*   CHLORIDE  102  103   CO2  23  24   GLUCOSE  112*  96   BUN  4*  4*     Recent Labs      07/02/18   0112 07/03/18   0347   CREATININE  0.42*  0.56       Imaging:  Ct-chest (thorax) W/o    Result Date: 7/2/2018 7/2/2018 6:10 PM HISTORY/REASON FOR EXAM:  Abnormal chest x-ray, metastatic cholangiocarcinoma. TECHNIQUE/EXAM DESCRIPTION: CT scan of the chest without contrast. Noncontrast helical scanning of the chest was obtained from the lung apices through the adrenal glands. Low dose optimization technique was utilized for this CT exam including automated exposure control and adjustment of the mA and/or kV according to patient size. COMPARISON: Chest x-ray 7/2/2018, PET/CT 5/21/2018 FINDINGS: Thoracic  aorta is normal in caliber. Coronary artery calcifications. RIGHT chest infusion port. Small bilateral pleural effusions, slightly larger on the RIGHT. Mild emphysematous changes. Ill-defined opacity of the LEFT lung base, primarily LEFT lower lobe. Bibasilar atelectasis. No pneumothorax. Visualized liver is enlarged and shows numerous low-density lesions consistent with metastasis.     1.  Small bilateral pleural effusions with associated atelectasis. 2.  Findings concerning for superimposed LEFT lower lobe pneumonia. 3.  Mild emphysema. 4.  No pneumothorax. 5.  Extensive hepatic metastatic disease again seen.     Ct-head W/o    Result Date: 6/23/2018 6/23/2018 2:34 AM HISTORY/REASON FOR EXAM:  Head Injury Ground-level fall TECHNIQUE/EXAM DESCRIPTION AND NUMBER OF VIEWS: CT of the head without contrast. The study was performed on a helical multidetector CT scanner. Contiguous 2.5 mm axial sections were obtained from the skull base through the vertex. Up to date radiation dose reduction adjustments have been utilized to meet ALARA standards for radiation dose reduction. COMPARISON:  None available FINDINGS: There is no evidence of acute intracranial hemorrhage, mass, mass-effect or shift of midline structures. Gray-white matter differentiation is grossly preserved. Ventricle size and brain parenchymal volume are appropriate for this patient's stated age. The basal cisterns are patent. There are no abnormal extra-axial fluid collections. No depressed or widely  calvarial fracture is seen. The visualized paranasal sinuses and temporal bone structures are aerated. ___________________________________     1. No acute intracranial abnormality. No evidence of acute intracranial hemorrhage or mass lesion.     Dx-chest-2 Views    Result Date: 6/30/2018 6/30/2018 11:49 AM HISTORY/REASON FOR EXAM: Right epigastric pain, liver carcinoma. TECHNIQUE/EXAM DESCRIPTION AND NUMBER OF VIEWS: Two views of the chest.  COMPARISON:  June 23 FINDINGS: Right internal jugular leonardo catheter tip overlies the SVC. Cardiomediastinal contours are stable given new left subpulmonic pleural fluid and/or basilar atelectasis/consolidation which effaces the left heart border. Diminished right lung volume with possible subpulmonic fluid. Slight patchy opacity above the level of the leonardo catheter laterally Decreasing lung volumes     Decreasing lung volumes with possible new subpulmonic fluid especially in the left and there is adjacent atelectasis or consolidation    Dx-chest-2 Views    Result Date: 6/4/2018 6/4/2018 5:13 PM HISTORY/REASON FOR EXAM:  Increasing weakness. Metastatic ovarian cancer. TECHNIQUE/EXAM DESCRIPTION AND NUMBER OF VIEWS: Two views of the chest. COMPARISON:  5/9/2018 FINDINGS: There is a Port-A-Cath in place with the tip projecting over the SVC. Port projects over the right chest wall. The mediastinal and cardiac silhouette is unremarkable. The pulmonary vascularity is within normal limits. There is minimal linear opacity in the left lower lobe. There is a small amount of bilateral dependent pleural fluid. There is no visible pneumothorax. There are no acute bony abnormalities.     1.  There is no significant change in linear left lower lobe atelectasis or pneumonitis with a trace of adjacent fluid. 2.  There is a trace of right CP angle fluid. Findings were discussed with JEANINE VELEZ on 6/4/2018 at 5:34 PM.    Dx-chest-limited (1 View)    Result Date: 7/2/2018 7/2/2018 1:35 PM HISTORY/REASON FOR EXAM:  Status post left thoracentesis. Evaluate for pneumothorax. TECHNIQUE/EXAM DESCRIPTION AND NUMBER OF VIEWS: Single AP view of the chest. COMPARISON: 6/30/2018 FINDINGS: Lines/tubes:  Right IJV Port-A-Cath remains in place.. Lungs:  No pulmonary infiltrates or consolidations are noted. Pleura:  No pneumothorax is identified following left thoracentesis. No significant residual left pleural effusion is identified.  Heart and mediastinum:  The heart silhouette is within normal limits. Bones:  Negative.     No pneumothorax following left thoracentesis.    Dx-chest-portable (1 View)    Result Date: 6/23/2018 6/23/2018 2:21 AM HISTORY/REASON FOR EXAM:  Altered mental status TECHNIQUE/EXAM DESCRIPTION AND NUMBER OF VIEWS: Single portable view of the chest. COMPARISON: 6/4/2018 FINDINGS: Right chest port in place. No pulmonary infiltrates or consolidations are noted. No pleural effusion. No pneumothorax. Stable cardiopericardial silhouette.     1. No acute cardiopulmonary abnormalities are identified.    Mr-brain-with & W/o    Result Date: 6/5/2018 6/4/2018 4:58 PM HISTORY/REASON FOR EXAM:  Evaluate for metastasis. TECHNIQUE/EXAM DESCRIPTION:   MRI of the brain without and with contrast. T1 sagittal, T2 fast spin-echo axial, T1 coronal, FLAIR coronal, diffusion-weighted and apparent diffusion coefficient (ADC map) axial images were obtained of the whole brain. T1 postcontrast axial and T1 postcontrast coronal images were obtained. The study was performed on a [employer] Signa 1.5 Isabell MRI scanner. 12 mL Omniscan contrast was administered intravenously. COMPARISON:  2/24/2016 FINDINGS: There is no evidence of abnormal meningeal or parenchymal enhancing lesion. The skull bones appear normal. The extracranial soft tissue including orbits appear grossly normal.   There is no large lesion identified in the expected course of the intracranial portions of the cranial nerves. There is no acute infarct. There is no acute or chronic parenchymal hemorrhage. A few nonspecific T2 hyperintensities are noted in the supratentorial white matter and brainstem. There is no intra-axial space-occupying lesion. There is no sellar or juxtasellar lesion. There is no extra-axial fluid collection, hemorrhage or mass. The visualized flow voids of the cerebral vasculature are unremarkable.     1.  There is no evidence of intracranial metastasis. 2.  Mild cerebral  atrophy. 3.  Mild chronic microvascular ischemic disease. 4.  There has been no significant interval change.    Us-gallbladder    Result Date: 7/2/2018 7/2/2018 6:08 AM HISTORY/REASON FOR EXAM:  Leukocytosis. History of cholangiocarcinoma. TECHNIQUE/EXAM DESCRIPTION AND NUMBER OF VIEWS:  Real-time sonography of the gallbladder. COMPARISON: 11/9/2017 FINDINGS: There is no ascites. The liver is normal in contour. The solid mass is present in the inferior aspect of the right lobe of the liver laterally measuring 2.3 x 1.5 x 2.1 cm. This may represent residual or recurrent hepatic neoplasm. The major of the liver is diffusely heterogeneous and diffuse hepatic metastatic involvement cannot be excluded. The liver is enlarged and measures 26.24 cm. The gallbladder contains sludge and a polyp. No stones are identified. The gallbladder wall thickness measures 0.29 cm There is no pericholecystic fluid. The common duct measures 0.41 cm. The visualized pancreas is unremarkable. The visualized aorta is normal in caliber. Intrahepatic IVC is patent. The portal vein is patent with hepatopetal flow. The right kidney measures 13.23 cm. Several simple renal cysts are present. The largest is in the upper pole measuring 2.1 cm in diameter.     1.  Enlarged liver which is diffusely heterogeneous. This could represent diffuse metastatic involvement or spread of cholangiocarcinoma. One residual round mass is identified in the inferior aspect of the right lobe of liver measuring 2.3 cm in diameter. 2.  Sludge and polyp within the gallbladder. No cholelithiasis or biliary dilatation. 3.  Pancreas not visualized. 4.  No right hydronephrosis. 5.  No ascites or portal venous flow reversal.    Us-thoracentesis Puncture Left    Result Date: 7/2/2018 7/2/2018 12:56 PM HISTORY/REASON FOR EXAM:  Left pleural effusion TECHNIQUE/EXAM DESCRIPTION: Ultrasound-guided thoracentesis. Indication:  LEFT pleural fluid collection. COMPARISON:  None  PROCEDURE:     Informed consent was obtained. A timeout was taken. A left pleural effusion was localized with real-time ultrasound guidance. The left posterior chest wall was prepped and draped in a sterile manner. Following local anesthesia with 1% lidocaine, a 5 Faroese Yueh pigtail catheter was advanced into the pleural space with trocar technique and pleural fluid was drained. The patient tolerated the procedure well without evidence of complication. A post thoracentesis chest radiograph is forthcoming. FINDINGS: Left pleural effusion Fluid was sent to the laboratory. 360 mL sent to laboratory for analysis Fluid character: straw colored     1. Ultrasound guided left sided diagnostic and therapeutic thoracentesis. 2. 360 mL of fluid withdrawn.      Micro:  Results     Procedure Component Value Units Date/Time    FUNGAL CULTURE [[id number]] Collected:  07/02/18 1335    Order Status:  Completed Specimen:  Body Fluid Updated:  07/04/18 1016     Significant Indicator NEG     Source BF     Site Pleural Fluid/left     Fungal Culture Culture in progress.    Narrative:       Lt pleural fluid    FLUID CULTURE W/GRAM STAIN [[id number]] Collected:  07/02/18 1335    Order Status:  Completed Specimen:  Body Fluid Updated:  07/04/18 1016     Significant Indicator NEG     Source BF     Site Pleural Fluid/left     Culture Result Bdf No growth at 48 hours.     Gram Stain Result No organisms seen.    Narrative:       Lt pleural fluid    GRAM STAIN [[id number]] Collected:  07/02/18 1335    Order Status:  Completed Specimen:  Body Fluid Updated:  07/02/18 2321     Significant Indicator .     Source BF     Site Pleural Fluid/left     Gram Stain Result No organisms seen.    Narrative:       Lt pleural fluid    MRSA BY PCR (AMP) [[id number]] Collected:  07/02/18 1427    Order Status:  Completed Specimen:  Respirate from Nares Updated:  07/02/18 1640     Significant Indicator NEG     Source RESP     Site NARES     MRSA PCR Negative for MRSA  "by PCR.    Narrative:       Collected By:[id number] FLASH CONN    FLUID CULTURE W/GRAM STAIN [[id number]]     Order Status:  Canceled Specimen:  Body Fluid from Thoracentesis Fluid     FUNGAL CULTURE [[id number]]     Order Status:  Canceled Specimen:  Body Fluid from Thoracentesis Fluid     BLOOD CULTURE [[id number]] Collected:  06/30/18 0837    Order Status:  Completed Specimen:  Blood from Peripheral Updated:  07/01/18 0718     Significant Indicator NEG     Source BLD     Site PERIPHERAL     Blood Culture No Growth    Note: Blood cultures are incubated for 5 days and  are monitored continuously.Positive blood cultures  are called to the RN and reported as soon as  they are identified.      Narrative:       Per Hospital Policy: Only change Specimen Src: to \"Line\" if  specified by physician order.    BLOOD CULTURE [[id number]] Collected:  06/30/18 0837    Order Status:  Completed Specimen:  Blood from Peripheral Updated:  07/01/18 0718     Significant Indicator NEG     Source BLD     Site PERIPHERAL     Blood Culture No Growth    Note: Blood cultures are incubated for 5 days and  are monitored continuously.Positive blood cultures  are called to the RN and reported as soon as  they are identified.      Narrative:       Per Hospital Policy: Only change Specimen Src: to \"Line\" if  specified by physician order.    CULTURE RESP W/O GRAM STAIN [[id number]]     Order Status:  Completed Specimen:  Respirate from Sputum     URINALYSIS [[id number]]  (Abnormal) Collected:  06/30/18 0950    Order Status:  Completed Specimen:  Urine from Urine, Clean Catch Updated:  06/30/18 1009     Color DK Yellow     Character Turbid (A)     Specific Gravity 1.021     Ph 5.0     Glucose Negative mg/dL      Ketones Trace (A) mg/dL      Protein 30 (A) mg/dL      Bilirubin Small (A)     Urobilinogen, Urine 1.0     Nitrite Negative     Leukocyte Esterase Trace (A)     Occult Blood Negative     Micro Urine Req Microscopic    Narrative:       " Collected By:[id number] TRISHA ANGUIANO    BLOOD CULTURE [[id number]] Collected:  06/23/18 0312    Order Status:  Completed Specimen:  Blood from Peripheral Updated:  06/28/18 0500     Significant Indicator NEG     Source BLD     Site PERIPHERAL     Blood Culture No growth after 5 days of incubation.    Narrative:       2 of 2 blood culture x2  Sites order    BLOOD CULTURE [[id number]] Collected:  06/23/18 0204    Order Status:  Completed Specimen:  Blood from Peripheral Updated:  06/28/18 0300     Significant Indicator NEG     Source BLD     Site PERIPHERAL     Blood Culture No growth after 5 days of incubation.    Narrative:       1 of 2 for Blood Culture x 2 sites order          Assessment:  Active Hospital Problems    Diagnosis   • *Acute encephalopathy [G93.40]   • Acute cystitis without hematuria [N30.00]   • Cholangiocarcinoma metastatic to liver (HCC) [C22.1, C78.7]   • HCAP (healthcare-associated pneumonia) [J18.9]   • Hypokalemia [E87.6]   • Hyponatremia [E87.1]   • Anemia associated with chemotherapy [D64.81, T45.1X5A]   • Dehydration [E86.0]   • Depression [F32.9]   • Neuropathy (HCC) [G62.9]       Plan:  Pneumonia  The CT scan done on 7/2/2018 showed left lower lobe pneumonia  Currently on Zosyn.  Aim for at least 7 days  The pleural fluid was drained and culture is negative-follow the pathology    Leukocytosis  Multifactorial  Pneumonia as well as metastatic disease playing a role likely    Metastatic cholangiocarcinoma    Prognosis  Guarded  Discussed with internal medicine.

## 2018-07-04 NOTE — PROGRESS NOTES
[facility] Hospitalist Progress Note    Date of Service: 2018    Chief Complaint  63 y.o. female admitted 2018 with altered mental status, dehydration, UTI and PNA with known cholangiocarcinoma.    Interval Problem Update  7/3 Patient states she feels much better than earlier in her admission.  Her mentation is back to baseline and she denies any shortness of breath.  360cc thoracentesis yesterday without significant change in her breathing per patient.  Continue IV abx.   Patient states she feels very full today, skin stretched on BLE and abdomen - has gone from dehydrated to volume overload and now needs diuresis - she had responded well to this in the past - will schedule bid until less edematous.  WBC dropped slightly overnight, afebrile.    Consultants/Specialty  ID - Alexey    Disposition  Home with HH when appropriate.        Review of Systems   Constitutional: Negative for chills and fever.   HENT: Negative for congestion.    Eyes: Negative for blurred vision and photophobia.   Respiratory: Negative for cough and shortness of breath.    Cardiovascular: Negative for chest pain, claudication and leg swelling.   Gastrointestinal: Negative for abdominal pain, constipation, diarrhea, heartburn and vomiting.   Genitourinary: Negative for dysuria and hematuria.   Musculoskeletal: Negative for joint pain and myalgias.   Skin: Negative for itching and rash.   Neurological: Negative for dizziness, sensory change, speech change, weakness and headaches.   Psychiatric/Behavioral: Negative for depression. The patient is not nervous/anxious and does not have insomnia.       Physical Exam  Laboratory/Imaging   Hemodynamics  Temp (24hrs), Av.6 °C (97.9 °F), Min:36.4 °C (97.6 °F), Max:36.8 °C (98.3 °F)   Temperature: 36.6 °C (97.8 °F)  Pulse  Av  Min: 67  Max: 117    Blood Pressure: (!) 97/68      Respiratory      Respiration: 17, Pulse Oximetry: 94 %     Work Of Breathing / Effort: Shallow        Fluids    Intake/Output Summary (Last 24 hours) at 07/04/18 1245  Last data filed at 07/04/18 1000   Gross per 24 hour   Intake              500 ml   Output                0 ml   Net              500 ml       Nutrition  Orders Placed This Encounter   Procedures   • Diet Order Regular     Standing Status:   Standing     Number of Occurrences:   1     Order Specific Question:   Diet:     Answer:   Regular [1]     Physical Exam   Constitutional: She is oriented to person, place, and time. She appears well-developed and well-nourished. No distress.   HENT:   Head: Normocephalic and atraumatic.   Eyes: Conjunctivae are normal. No scleral icterus.   Neck: Neck supple. No JVD present.   Cardiovascular: Normal rate, regular rhythm and normal heart sounds.  Exam reveals no gallop and no friction rub.    No murmur heard.  Pulmonary/Chest: Effort normal and breath sounds normal. No respiratory distress. She exhibits no tenderness.   Decreased at the bases     Abdominal: Soft. Bowel sounds are normal. She exhibits distension. There is tenderness (abdomen taught to palpation -c/w volume overload). There is no guarding.   Musculoskeletal: She exhibits edema (BLE). She exhibits no tenderness.   Neurological: She is alert and oriented to person, place, and time. No cranial nerve deficit.   Skin: Skin is warm and dry. She is not diaphoretic. No erythema. No pallor.   Psychiatric: She has a normal mood and affect. Her behavior is normal.   Nursing note and vitals reviewed.      Recent Labs      07/02/18 0112 07/03/18 0347  07/04/18   0841   WBC  18.9*  22.3*  21.8*   RBC  2.95*  2.96*  2.85*   HEMOGLOBIN  9.4*  9.3*  9.4*   HEMATOCRIT  30.1*  30.6*  29.1*   MCV  102.0*  103.4*  102.1*   MCH  31.9  31.4  33.0   MCHC  31.2*  30.4*  32.3*   RDW  68.8*  71.7*  72.2*   PLATELETCT  386  425  337   MPV  9.3  9.6  9.4     Recent Labs      07/02/18 0112 07/03/18   0347   SODIUM  133*  136   POTASSIUM  3.0*  3.5*   CHLORIDE  102   103   CO2  23  24   GLUCOSE  112*  96   BUN  4*  4*   CREATININE  0.42*  0.56   CALCIUM  8.3*  8.4*     Recent Labs      07/02/18   1030   APTT  27.3   INR  1.33*                  Assessment/Plan     * Acute encephalopathy   Assessment & Plan    resolved, likely due to infection.          Acute cystitis without hematuria   Assessment & Plan    Likely the primary source of encephalopathy  Completed 6 days of levaquin, urine cx neg.        Cholangiocarcinoma metastatic to liver (HCC)- (present on admission)   Assessment & Plan    Started chemotherapy with Dr Aleman 3 weeks ago.  Will need to f/u with Dr Aleman when d/c.           Intractable abdominal pain- (present on admission)   Assessment & Plan    Chronic issue  Responding well to current regimen.          Bilateral leg edema   Assessment & Plan    20# weight gain since admission including BLE edema along with abdomen  Responded well to lasix in the past - scheduled bid now          HCAP (healthcare-associated pneumonia)   Assessment & Plan    Zosyn  ID consultation  s/p thoracentesis 7/2/18 with 360cc removed  Culture from this pending  Increased WBC following thoracentesis - likely reactive rather than worsening infection.        Hypokalemia   Assessment & Plan    Replacing as needed          Anemia associated with chemotherapy   Assessment & Plan    Chemotherapy/cancer induced.  Hb stable. No signs of active bleeding            Hyponatremia   Assessment & Plan    Resolved          Dehydration- (present on admission)   Assessment & Plan    Resolved.          Depression- (present on admission)   Assessment & Plan    on wellbutrin.          Neuropathy (HCC)- (present on admission)   Assessment & Plan    Continue neurontin.            Quality-Core Measures   Reviewed items::  Labs reviewed, Medications reviewed and Radiology images reviewed  Duarte catheter::  No Duarte  DVT prophylaxis pharmacological::  Enoxaparin (Lovenox)  Antibiotics:  Treating active  infection/contamination beyond 24 hours perioperative coverage

## 2018-07-04 NOTE — DISCHARGE PLANNING
O2 ordered for pt in the, Workqueue. Met bedside with pt. Pt states she does not usually use O2 and does not want to go home with O2 if possible. Pt is currently receiving IV ABX and will continue to do so until infection is treated. In the event pt needs to dc with O2, physician will need to submit a, Face to Face.

## 2018-07-04 NOTE — CARE PLAN
Problem: Pain Management  Goal: Pain level will decrease to patient's comfort goal  Patent's pain being managed with PRN oxycodone     Problem: Mobility  Goal: Risk for activity intolerance will decrease  Patient ambulating with FWW and standby assist.

## 2018-07-04 NOTE — PROGRESS NOTES
VSS on 1L O2 via nc, afebrile. Pain well controlled with PRN Oxycodone. Pt up with walker and 1 assist. Lasix given x2 with good output. Bed check remains on for safety. Call light within reach, able to make needs known.

## 2018-07-04 NOTE — PROGRESS NOTES
Patient is alert and oriented up with one person assist with walker. Family at bedside this afternoon. Pain medication given as needed. Report weight increase to Dr. Melara. Call light within reach hourly rounding in practice. Bed alarm is on.

## 2018-07-04 NOTE — CARE PLAN
Problem: Safety  Goal: Will remain free from falls    Intervention: Assess risk factors for falls  Patient at high risk for falls bed alarm is on      Problem: Pain Management  Goal: Pain level will decrease to patient's comfort goal    Intervention: Follow pain managment plan developed in collaboration with patient and Interdisciplinary Team  Pain medication given as needed and hot packs help will continue to monitor pain control

## 2018-07-04 NOTE — CARE PLAN
Problem: Safety  Goal: Will remain free from injury  Outcome: PROGRESSING AS EXPECTED    Goal: Will remain free from falls  Outcome: PROGRESSING AS EXPECTED      Problem: Bowel/Gastric:  Goal: Normal bowel function is maintained or improved  Outcome: PROGRESSING AS EXPECTED      Problem: Pain Management  Goal: Pain level will decrease to patient's comfort goal  Outcome: PROGRESSING AS EXPECTED
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
## 2018-07-05 NOTE — THERAPY
Pt with fall this afternoon just prior to attempting PT treat. Hold PT until Pt medically cleared and appropriate to participate with therapy (Pt to undergo head CT)    Hazel Romero PT/DPT  Pager# [phone]

## 2018-07-05 NOTE — PROGRESS NOTES
Loud bang and then a scream bed alarm alarming, found patient on the floor face down. With help with nursing staff we were able get patient up to the bed. Patient states she had to go to the bathroom and was trying to put on her slippers. [facility] [place]. Patient has a lump with bruising and swell on her forehead and complain of pain in her chest. Charge nurse Heather  notified Dr. Melara. New orders in placed. Patient's RN notified.

## 2018-07-05 NOTE — CARE PLAN
Problem: Bowel/Gastric:  Goal: Will not experience complications related to bowel motility  Outcome: PROGRESSING AS EXPECTED      Problem: Knowledge Deficit  Goal: Knowledge of disease process/condition, treatment plan, diagnostic tests, and medications will improve  Outcome: PROGRESSING SLOWER THAN EXPECTED

## 2018-07-05 NOTE — PROGRESS NOTES
Pt A&Ox4, c/o pain 9/10 in right abdomen. Administered PRN pain medication as ordered. Pt reports pain down to 6/10 after pain medication. Pt assisted to bathroom with SBA and walker. Pt tolerated well. No other needs at this time. Call light in reach, hourly rounding in place.

## 2018-07-05 NOTE — PROGRESS NOTES
Infectious Disease Progress Note    Author: Yenifer Blackburn M.D. Date & Time of service: 2018  4:30 PM    Chief Complaint:  Leukocytosis    Interval History:  7/3/2018 T-max 98.4 WBC 22.3 platelets 425 creatinine 0.56  2018 T-max 98.3 overall feels better.  WBC 21.8 platelets 337 and 0.56  2018-T-max 98.6 WBC 25 feels much better no new issues overnight  Labs Reviewed, Medications Reviewed and Radiology Reviewed.    Review of Systems:  Review of Systems   Constitutional: Positive for malaise/fatigue. Negative for fever.        Worried about her weight gain   HENT: Negative for hearing loss.    Respiratory: Negative for shortness of breath.    Cardiovascular: Positive for leg swelling. Negative for chest pain.   Gastrointestinal: Positive for abdominal pain. Negative for diarrhea and nausea.   Genitourinary: Negative for dysuria.   Neurological: Negative for sensory change, speech change and weakness.       Hemodynamics:  Temp (24hrs), Av.7 °C (98 °F), Min:36.1 °C (97 °F), Max:37 °C (98.6 °F)  Temperature: 36.7 °C (98.1 °F)  Pulse  Av.6  Min: 67  Max: 117   Blood Pressure: (!) 95/58       Physical Exam:  Physical Exam   Constitutional: She is oriented to person, place, and time.   Looks chronically ill   HENT:   Mouth/Throat: No oropharyngeal exudate.   Edentulous   Eyes: No scleral icterus.   Neck: Neck supple.   Cardiovascular: Regular rhythm.    No murmur heard.  Pulmonary/Chest: She has no wheezes. She has no rales.   Abdominal: She exhibits distension. There is tenderness.   Musculoskeletal: She exhibits edema.   Neurological: She is alert and oriented to person, place, and time.   Vitals reviewed.      Meds:    Current Facility-Administered Medications:   •  potassium chloride SA  •  [COMPLETED] piperacillin-tazobactam **AND** piperacillin-tazobactam  •  furosemide  •  acyclovir  •  buPROPion  •  gabapentin  •  morphine ER  •  senna-docusate **AND** polyethylene glycol/lytes **AND**  magnesium hydroxide **AND** bisacodyl  •  labetalol  •  ondansetron  •  ondansetron  •  promethazine  •  promethazine  •  prochlorperazine  •  acetaminophen  •  Notify provider if pain remains uncontrolled **AND** Use the numeric rating scale (NRS-11) on regular floors and Critical-Care Pain Observation Tool (CPOT) on ICUs/Trauma to assess pain **AND** Pulse Ox (Oximetry) **AND** Pharmacy Consult Request **AND** If patient difficult to arouse and/or has respiratory depression, stop any opiates that are currently infusing and call a Rapid Response. **AND** oxyCODONE immediate-release **AND** oxyCODONE immediate-release **AND** HYDROmorphone  •  enoxaparin (LOVENOX) injection    Labs:  Recent Labs      07/03/18 0347 07/04/18   0841  07/05/18   0010   WBC  22.3*  21.8*  24.9*   RBC  2.96*  2.85*  2.88*   HEMOGLOBIN  9.3*  9.4*  9.2*   HEMATOCRIT  30.6*  29.1*  29.1*   MCV  103.4*  102.1*  101.0*   MCH  31.4  33.0  31.9   RDW  71.7*  72.2*  71.2*   PLATELETCT  425  337  403   MPV  9.6  9.4  9.8   NEUTSPOLYS  81.00*  81.50*  80.80*   LYMPHOCYTES  6.80*  5.90*  7.00*   MONOCYTES  8.30  3.40  8.60   EOSINOPHILS  0.70  0.00  0.70   BASOPHILS  0.80  3.40*  0.60   RBCMORPHOLO   --   Present   --      Recent Labs      07/03/18   0347  07/05/18   0010   SODIUM  136  133*   POTASSIUM  3.5*  3.2*   CHLORIDE  103  98   CO2  24  25   GLUCOSE  96  102*   BUN  4*  5*     Recent Labs      07/03/18   0347  07/05/18   0010   CREATININE  0.56  0.49*       Imaging:  Ct-chest (thorax) W/o    Result Date: 7/2/2018 7/2/2018 6:10 PM HISTORY/REASON FOR EXAM:  Abnormal chest x-ray, metastatic cholangiocarcinoma. TECHNIQUE/EXAM DESCRIPTION: CT scan of the chest without contrast. Noncontrast helical scanning of the chest was obtained from the lung apices through the adrenal glands. Low dose optimization technique was utilized for this CT exam including automated exposure control and adjustment of the mA and/or kV according to patient size.  COMPARISON: Chest x-ray 7/2/2018, PET/CT 5/21/2018 FINDINGS: Thoracic aorta is normal in caliber. Coronary artery calcifications. RIGHT chest infusion port. Small bilateral pleural effusions, slightly larger on the RIGHT. Mild emphysematous changes. Ill-defined opacity of the LEFT lung base, primarily LEFT lower lobe. Bibasilar atelectasis. No pneumothorax. Visualized liver is enlarged and shows numerous low-density lesions consistent with metastasis.     1.  Small bilateral pleural effusions with associated atelectasis. 2.  Findings concerning for superimposed LEFT lower lobe pneumonia. 3.  Mild emphysema. 4.  No pneumothorax. 5.  Extensive hepatic metastatic disease again seen.     Ct-head W/o    Result Date: 6/23/2018 6/23/2018 2:34 AM HISTORY/REASON FOR EXAM:  Head Injury Ground-level fall TECHNIQUE/EXAM DESCRIPTION AND NUMBER OF VIEWS: CT of the head without contrast. The study was performed on a helical multidetector CT scanner. Contiguous 2.5 mm axial sections were obtained from the skull base through the vertex. Up to date radiation dose reduction adjustments have been utilized to meet ALARA standards for radiation dose reduction. COMPARISON:  None available FINDINGS: There is no evidence of acute intracranial hemorrhage, mass, mass-effect or shift of midline structures. Gray-white matter differentiation is grossly preserved. Ventricle size and brain parenchymal volume are appropriate for this patient's stated age. The basal cisterns are patent. There are no abnormal extra-axial fluid collections. No depressed or widely  calvarial fracture is seen. The visualized paranasal sinuses and temporal bone structures are aerated. ___________________________________     1. No acute intracranial abnormality. No evidence of acute intracranial hemorrhage or mass lesion.     Dx-chest-2 Views    Result Date: 6/30/2018 6/30/2018 11:49 AM HISTORY/REASON FOR EXAM: Right epigastric pain, liver carcinoma.  TECHNIQUE/EXAM DESCRIPTION AND NUMBER OF VIEWS: Two views of the chest. COMPARISON:  June 23 FINDINGS: Right internal jugular leonardo catheter tip overlies the SVC. Cardiomediastinal contours are stable given new left subpulmonic pleural fluid and/or basilar atelectasis/consolidation which effaces the left heart border. Diminished right lung volume with possible subpulmonic fluid. Slight patchy opacity above the level of the leonardo catheter laterally Decreasing lung volumes     Decreasing lung volumes with possible new subpulmonic fluid especially in the left and there is adjacent atelectasis or consolidation    Dx-chest-2 Views    Result Date: 6/4/2018 6/4/2018 5:13 PM HISTORY/REASON FOR EXAM:  Increasing weakness. Metastatic ovarian cancer. TECHNIQUE/EXAM DESCRIPTION AND NUMBER OF VIEWS: Two views of the chest. COMPARISON:  5/9/2018 FINDINGS: There is a Port-A-Cath in place with the tip projecting over the SVC. Port projects over the right chest wall. The mediastinal and cardiac silhouette is unremarkable. The pulmonary vascularity is within normal limits. There is minimal linear opacity in the left lower lobe. There is a small amount of bilateral dependent pleural fluid. There is no visible pneumothorax. There are no acute bony abnormalities.     1.  There is no significant change in linear left lower lobe atelectasis or pneumonitis with a trace of adjacent fluid. 2.  There is a trace of right CP angle fluid. Findings were discussed with JEANINE VELEZ on 6/4/2018 at 5:34 PM.    Dx-chest-limited (1 View)    Result Date: 7/2/2018 7/2/2018 1:35 PM HISTORY/REASON FOR EXAM:  Status post left thoracentesis. Evaluate for pneumothorax. TECHNIQUE/EXAM DESCRIPTION AND NUMBER OF VIEWS: Single AP view of the chest. COMPARISON: 6/30/2018 FINDINGS: Lines/tubes:  Right IJV Port-A-Cath remains in place.. Lungs:  No pulmonary infiltrates or consolidations are noted. Pleura:  No pneumothorax is identified following left  thoracentesis. No significant residual left pleural effusion is identified. Heart and mediastinum:  The heart silhouette is within normal limits. Bones:  Negative.     No pneumothorax following left thoracentesis.    Dx-chest-portable (1 View)    Result Date: 6/23/2018 6/23/2018 2:21 AM HISTORY/REASON FOR EXAM:  Altered mental status TECHNIQUE/EXAM DESCRIPTION AND NUMBER OF VIEWS: Single portable view of the chest. COMPARISON: 6/4/2018 FINDINGS: Right chest port in place. No pulmonary infiltrates or consolidations are noted. No pleural effusion. No pneumothorax. Stable cardiopericardial silhouette.     1. No acute cardiopulmonary abnormalities are identified.    Mr-brain-with & W/o    Result Date: 6/5/2018 6/4/2018 4:58 PM HISTORY/REASON FOR EXAM:  Evaluate for metastasis. TECHNIQUE/EXAM DESCRIPTION:   MRI of the brain without and with contrast. T1 sagittal, T2 fast spin-echo axial, T1 coronal, FLAIR coronal, diffusion-weighted and apparent diffusion coefficient (ADC map) axial images were obtained of the whole brain. T1 postcontrast axial and T1 postcontrast coronal images were obtained. The study was performed on a [employer] Signa 1.5 Isabell MRI scanner. 12 mL Omniscan contrast was administered intravenously. COMPARISON:  2/24/2016 FINDINGS: There is no evidence of abnormal meningeal or parenchymal enhancing lesion. The skull bones appear normal. The extracranial soft tissue including orbits appear grossly normal.   There is no large lesion identified in the expected course of the intracranial portions of the cranial nerves. There is no acute infarct. There is no acute or chronic parenchymal hemorrhage. A few nonspecific T2 hyperintensities are noted in the supratentorial white matter and brainstem. There is no intra-axial space-occupying lesion. There is no sellar or juxtasellar lesion. There is no extra-axial fluid collection, hemorrhage or mass. The visualized flow voids of the cerebral vasculature are  unremarkable.     1.  There is no evidence of intracranial metastasis. 2.  Mild cerebral atrophy. 3.  Mild chronic microvascular ischemic disease. 4.  There has been no significant interval change.    Us-gallbladder    Result Date: 7/2/2018 7/2/2018 6:08 AM HISTORY/REASON FOR EXAM:  Leukocytosis. History of cholangiocarcinoma. TECHNIQUE/EXAM DESCRIPTION AND NUMBER OF VIEWS:  Real-time sonography of the gallbladder. COMPARISON: 11/9/2017 FINDINGS: There is no ascites. The liver is normal in contour. The solid mass is present in the inferior aspect of the right lobe of the liver laterally measuring 2.3 x 1.5 x 2.1 cm. This may represent residual or recurrent hepatic neoplasm. The major of the liver is diffusely heterogeneous and diffuse hepatic metastatic involvement cannot be excluded. The liver is enlarged and measures 26.24 cm. The gallbladder contains sludge and a polyp. No stones are identified. The gallbladder wall thickness measures 0.29 cm There is no pericholecystic fluid. The common duct measures 0.41 cm. The visualized pancreas is unremarkable. The visualized aorta is normal in caliber. Intrahepatic IVC is patent. The portal vein is patent with hepatopetal flow. The right kidney measures 13.23 cm. Several simple renal cysts are present. The largest is in the upper pole measuring 2.1 cm in diameter.     1.  Enlarged liver which is diffusely heterogeneous. This could represent diffuse metastatic involvement or spread of cholangiocarcinoma. One residual round mass is identified in the inferior aspect of the right lobe of liver measuring 2.3 cm in diameter. 2.  Sludge and polyp within the gallbladder. No cholelithiasis or biliary dilatation. 3.  Pancreas not visualized. 4.  No right hydronephrosis. 5.  No ascites or portal venous flow reversal.    Us-thoracentesis Puncture Left    Result Date: 7/2/2018 7/2/2018 12:56 PM HISTORY/REASON FOR EXAM:  Left pleural effusion TECHNIQUE/EXAM DESCRIPTION:  "Ultrasound-guided thoracentesis. Indication:  LEFT pleural fluid collection. COMPARISON:  None PROCEDURE:     Informed consent was obtained. A timeout was taken. A left pleural effusion was localized with real-time ultrasound guidance. The left posterior chest wall was prepped and draped in a sterile manner. Following local anesthesia with 1% lidocaine, a 5 [place] Yueh pigtail catheter was advanced into the pleural space with trocar technique and pleural fluid was drained. The patient tolerated the procedure well without evidence of complication. A post thoracentesis chest radiograph is forthcoming. FINDINGS: Left pleural effusion Fluid was sent to the laboratory. 360 mL sent to laboratory for analysis Fluid character: straw colored     1. Ultrasound guided left sided diagnostic and therapeutic thoracentesis. 2. 360 mL of fluid withdrawn.      Micro:  Results     Procedure Component Value Units Date/Time    BLOOD CULTURE [[id number]] Collected:  06/30/18 0837    Order Status:  Completed Specimen:  Blood from Peripheral Updated:  07/05/18 1100     Significant Indicator NEG     Source BLD     Site PERIPHERAL     Blood Culture No growth after 5 days of incubation.    Narrative:       Per Hospital Policy: Only change Specimen Src: to \"Line\" if  specified by physician order.    BLOOD CULTURE [[id number]] Collected:  06/30/18 0837    Order Status:  Completed Specimen:  Blood from Peripheral Updated:  07/05/18 1100     Significant Indicator NEG     Source BLD     Site PERIPHERAL     Blood Culture No growth after 5 days of incubation.    Narrative:       Per Hospital Policy: Only change Specimen Src: to \"Line\" if  specified by physician order.    FUNGAL CULTURE [[id number]] Collected:  07/02/18 1335    Order Status:  Completed Specimen:  Body Fluid Updated:  07/05/18 0821     Significant Indicator NEG     Source BF     Site Pleural Fluid/left     Fungal Culture Culture in progress.    Narrative:       Lt pleural fluid    FLUID " CULTURE W/GRAM STAIN [[id number]] Collected:  07/02/18 1335    Order Status:  Completed Specimen:  Body Fluid Updated:  07/05/18 0821     Gram Stain Result No organisms seen.     Significant Indicator NEG     Source BF     Site Pleural Fluid/left     Culture Result Bdf No growth at 72 hours.    Narrative:       Lt pleural fluid    GRAM STAIN [[id number]] Collected:  07/02/18 1335    Order Status:  Completed Specimen:  Body Fluid Updated:  07/02/18 2321     Significant Indicator .     Source BF     Site Pleural Fluid/left     Gram Stain Result No organisms seen.    Narrative:       Lt pleural fluid    MRSA BY PCR (AMP) [[id number]] Collected:  07/02/18 1427    Order Status:  Completed Specimen:  Respirate from Nares Updated:  07/02/18 1640     Significant Indicator NEG     Source RESP     Site NARES     MRSA PCR Negative for MRSA by PCR.    Narrative:       Collected By:[id number] FLASH CONN    FLUID CULTURE W/GRAM STAIN [[id number]]     Order Status:  Canceled Specimen:  Body Fluid from Thoracentesis Fluid     FUNGAL CULTURE [[id number]]     Order Status:  Canceled Specimen:  Body Fluid from Thoracentesis Fluid     CULTURE RESP W/O GRAM STAIN [[id number]]     Order Status:  Completed Specimen:  Respirate from Sputum     URINALYSIS [[id number]]  (Abnormal) Collected:  06/30/18 0950    Order Status:  Completed Specimen:  Urine from Urine, Clean Catch Updated:  06/30/18 1009     Color DK Yellow     Character Turbid (A)     Specific Gravity 1.021     Ph 5.0     Glucose Negative mg/dL      Ketones Trace (A) mg/dL      Protein 30 (A) mg/dL      Bilirubin Small (A)     Urobilinogen, Urine 1.0     Nitrite Negative     Leukocyte Esterase Trace (A)     Occult Blood Negative     Micro Urine Req Microscopic    Narrative:       Collected By:[id number] TRISHA ANGUIANO          Assessment:  Active Hospital Problems    Diagnosis   • *Acute encephalopathy [G93.40]   • Acute cystitis without hematuria [N30.00]   •  Cholangiocarcinoma metastatic to liver (HCC) [C22.1, C78.7]   • HCAP (healthcare-associated pneumonia) [J18.9]   • Hypokalemia [E87.6]   • Hyponatremia [E87.1]   • Anemia associated with chemotherapy [D64.81, T45.1X5A]   • Dehydration [E86.0]   • Depression [F32.9]   • Neuropathy (HCC) [G62.9]       Plan:  Pneumonia  The CT scan done on 7/2/2018 showed left lower lobe pneumonia  Currently on Zosyn.  Aim for at least 7 days through 7/6/2018  The pleural fluid was drained and culture is negative-pathology is negative for malignancy    Leukocytosis  Multifactorial  Pneumonia as well as metastatic disease playing a role likely    Metastatic cholangiocarcinoma    Prognosis  Guarded  Discussed with internal medicine.

## 2018-07-05 NOTE — CARE PLAN
Problem: Safety  Goal: Will remain free from injury    Intervention: Collaborate with Interdisciplinary Team for safe transfer and mobilization techniques  Patient assisted to bathroom on numerous occasions during shift      Problem: Skin Integrity  Goal: Risk for impaired skin integrity will decrease    Intervention: Assess and monitor skin integrity, appearance and/or temperature  Skin inegrity assessed and monitored throughout shift

## 2018-07-05 NOTE — PROGRESS NOTES
Assumed care of patient at 1900 after receiving report from day-shift RN.  Pt is A&Ox4, VSS, resting in bed. Pt c/o of right-side abdominal pain, medicated per MAR . Pt on 1L via nasal canula. Port patent with good return and PIV patent, flushing well and saline locked. POC discussed, pt calls appropriately for assistance, all needs met at this time, will continue to round.

## 2018-07-06 NOTE — PROGRESS NOTES
[facility] Hospitalist Progress Note    Date of Service: 7/5/2018    Chief Complaint  63 y.o. female admitted 6/23/2018 with altered mental status, dehydration, UTI and PNA with known cholangiocarcinoma.    Interval Problem Update  7/3 Patient states she feels much better than earlier in her admission.  Her mentation is back to baseline and she denies any shortness of breath.  360cc thoracentesis yesterday without significant change in her breathing per patient.  Continue IV abx.  7/4 Patient states she feels very full today, skin stretched on BLE and abdomen - has gone from dehydrated to volume overload and now needs diuresis - she had responded well to this in the past - will schedule bid until less edematous.  WBC dropped slightly overnight, afebrile.  7/5 Patient feeling tired this am from frequent urination from lasix.  Her abdomen is much less distended and pain has lessened.  Her legs are still edematous but have improved.  WBC climbed but patient afebrile and no sign of worsening infection.  After examination RN reported to me patient had fall and hit head and complained of chest pain thereafter, stat CT head and CXR unremarkable for acute injury and EKG showed sinus tachycardia.  BP is trending slightly low and she is diuresing well - will cut back dose tomorrow am - has already received the lasix doses today.    Consultants/Specialty  ID - Alexey    Disposition  Home with  when appropriate.        Review of Systems   Constitutional: Negative for chills and fever.   HENT: Negative for congestion.    Eyes: Negative for blurred vision and photophobia.   Respiratory: Negative for cough and shortness of breath.    Cardiovascular: Positive for chest pain. Negative for claudication and leg swelling.   Gastrointestinal: Positive for abdominal pain. Negative for constipation, diarrhea, heartburn, nausea and vomiting.   Genitourinary: Negative for dysuria and hematuria.   Musculoskeletal: Negative for joint pain and  myalgias.   Skin: Negative for itching and rash.   Neurological: Negative for dizziness, sensory change, speech change, weakness and headaches.   Psychiatric/Behavioral: Negative for depression. The patient is not nervous/anxious and does not have insomnia.       Physical Exam  Laboratory/Imaging   Hemodynamics  Temp (24hrs), Av.7 °C (98 °F), Min:36.1 °C (97 °F), Max:37 °C (98.6 °F)   Temperature: 36.7 °C (98.1 °F)  Pulse  Av.6  Min: 67  Max: 117    Blood Pressure: (!) 95/58      Respiratory      Respiration: 19, Pulse Oximetry: 95 %     Work Of Breathing / Effort: Shallow       Fluids  No intake or output data in the 24 hours ending 18 1811    Nutrition  Orders Placed This Encounter   Procedures   • Diet Order Regular     Standing Status:   Standing     Number of Occurrences:   1     Order Specific Question:   Diet:     Answer:   Regular [1]     Physical Exam   Constitutional: She is oriented to person, place, and time. She appears well-developed and well-nourished. No distress.   HENT:   Head: Normocephalic and atraumatic.   Eyes: Conjunctivae are normal. No scleral icterus.   Neck: Neck supple. No JVD present.   Cardiovascular: Normal rate, regular rhythm and normal heart sounds.  Exam reveals no gallop and no friction rub.    No murmur heard.  Pulmonary/Chest: Effort normal and breath sounds normal. No respiratory distress. She has no wheezes. She exhibits no tenderness.   Decreased at the bases     Abdominal: Soft. Bowel sounds are normal. She exhibits no distension. There is tenderness (abdomen taught to palpation -c/w volume overload). There is no guarding.   Musculoskeletal: She exhibits edema (BLE - improved). She exhibits no tenderness.   Neurological: She is alert and oriented to person, place, and time. No cranial nerve deficit.   Skin: Skin is warm and dry. She is not diaphoretic. No erythema. No pallor.   Psychiatric: She has a normal mood and affect. Her behavior is normal.   Nursing  note and vitals reviewed.      Recent Labs      07/03/18   0347  07/04/18   0841  07/05/18   0010   WBC  22.3*  21.8*  24.9*   RBC  2.96*  2.85*  2.88*   HEMOGLOBIN  9.3*  9.4*  9.2*   HEMATOCRIT  30.6*  29.1*  29.1*   MCV  103.4*  102.1*  101.0*   MCH  31.4  33.0  31.9   MCHC  30.4*  32.3*  31.6*   RDW  71.7*  72.2*  71.2*   PLATELETCT  425  337  403   MPV  9.6  9.4  9.8     Recent Labs      07/03/18   0347  07/05/18   0010   SODIUM  136  133*   POTASSIUM  3.5*  3.2*   CHLORIDE  103  98   CO2  24  25   GLUCOSE  96  102*   BUN  4*  5*   CREATININE  0.56  0.49*   CALCIUM  8.4*  8.6                      Assessment/Plan     * Acute encephalopathy   Assessment & Plan    resolved, likely due to infection.          Acute cystitis without hematuria   Assessment & Plan    Likely the primary source of encephalopathy  Completed 6 days of levaquin, urine cx neg.        Cholangiocarcinoma metastatic to liver (HCC)- (present on admission)   Assessment & Plan    Started chemotherapy with Dr Aleman 3 weeks ago.  Will need to f/u with Dr Aleman when d/c.           Intractable abdominal pain- (present on admission)   Assessment & Plan    Chronic issue  Responding well to current regimen.          Fall   Assessment & Plan    Resulted in chest pain, hit head with small contusion  STAT CT head - normal  CXR - improved pneumonia, small pleural effusion.          Bilateral leg edema   Assessment & Plan    20# weight gain since admission including BLE edema along with abdomen  Responded well to lasix in the past - scheduled bid now          HCAP (healthcare-associated pneumonia)   Assessment & Plan    Zosyn  ID consultation  s/p thoracentesis 7/2/18 with 360cc removed  Culture from this pending  Increased WBC following thoracentesis - likely reactive rather than worsening infection.        Hypokalemia   Assessment & Plan    Replacing as needed          Anemia associated with chemotherapy   Assessment & Plan    Chemotherapy/cancer  induced.  Hb stable. No signs of active bleeding            Hyponatremia   Assessment & Plan    Resolved          Dehydration- (present on admission)   Assessment & Plan    Resolved.          Depression- (present on admission)   Assessment & Plan    on wellbutrin.          Neuropathy (HCC)- (present on admission)   Assessment & Plan    Continue neurontin.            Quality-Core Measures   Reviewed items::  Labs reviewed, Medications reviewed and Radiology images reviewed  Duarte catheter::  No Duarte  DVT prophylaxis pharmacological::  Enoxaparin (Lovenox)  Antibiotics:  Treating active infection/contamination beyond 24 hours perioperative coverage

## 2018-07-06 NOTE — PROGRESS NOTES
Assumed care of patient at 1900 after receiving report from day-shift RN.  Pt is A&Ox4, VSS, resting in bed. Pt c/o of right-side abdominal pain, patient given heat-pack and medicated per MAR. Pt on 1L via nasal canula. Port patent with good return and PIV patent, flushing well and saline locked. POC discussed, family at bedside, pt calls appropriately for assistance, all needs met at this time, will continue to round.

## 2018-07-06 NOTE — CARE PLAN
Problem: Safety  Goal: Will remain free from falls    Intervention: Implement fall precautions  Fall precautions in place.      Problem: Pain Management  Goal: Pain level will decrease to patient's comfort goal    Intervention: Follow pain managment plan developed in collaboration with patient and Interdisciplinary Team  Patient medicated for pain per MAR.

## 2018-07-06 NOTE — PROGRESS NOTES
Infectious Disease Progress Note    Author: Yenifer Blackburn M.D. Date & Time of service: 2018  11:48 AM    Chief Complaint:  Leukocytosis    Interval History:  7/3/2018 T-max 98.4 WBC 22.3 platelets 425 creatinine 0.56  2018 T-max 98.3 overall feels better.  WBC 21.8 platelets 337 and 0.56  2018-T-max 98.6 WBC 25 feels much better no new issues overnight  2018 T-max 99.2 denies any new issues WBC 25.3 denies any respiratory issues  Labs Reviewed, Medications Reviewed and Radiology Reviewed.    Review of Systems:  Review of Systems   Constitutional: Positive for malaise/fatigue. Negative for fever.        Worried about her weight gain   HENT: Negative for hearing loss.    Respiratory: Negative for shortness of breath.    Cardiovascular: Positive for leg swelling. Negative for chest pain.   Gastrointestinal: Positive for abdominal pain. Negative for diarrhea and nausea.   Genitourinary: Negative for dysuria.   Neurological: Negative for sensory change, speech change and weakness.       Hemodynamics:  Temp (24hrs), Av.9 °C (98.5 °F), Min:36.7 °C (98 °F), Max:37.3 °C (99.2 °F)  Temperature: 36.8 °C (98.2 °F)  Pulse  Av.9  Min: 67  Max: 117   Blood Pressure: 109/69       Physical Exam:  Physical Exam   Constitutional: She is oriented to person, place, and time. No distress.   Looks chronically ill but nontoxic   HENT:   Mouth/Throat: No oropharyngeal exudate.   Edentulous   Eyes: No scleral icterus.   Neck: Neck supple.   Cardiovascular: Regular rhythm.    No murmur heard.  Pulmonary/Chest: She has no wheezes. She has no rales.   Abdominal: She exhibits distension. There is tenderness.   Musculoskeletal: She exhibits edema.   Neurological: She is alert and oriented to person, place, and time.   Vitals reviewed.      Meds:    Current Facility-Administered Medications:   •  Notify provider if pain remains uncontrolled **AND** Use the numeric rating scale (NRS-11) on regular floors and  Critical-Care Pain Observation Tool (CPOT) on ICUs/Trauma to assess pain **AND** Pulse Ox (Oximetry) **AND** [DISCONTINUED] Pharmacy Consult Request **AND** If patient difficult to arouse and/or has respiratory depression, stop any opiates that are currently infusing and call a Rapid Response. **AND** oxyCODONE immediate-release **AND** oxyCODONE immediate-release **AND** HYDROmorphone  •  potassium chloride SA  •  [COMPLETED] piperacillin-tazobactam **AND** piperacillin-tazobactam  •  furosemide  •  acyclovir  •  buPROPion  •  gabapentin  •  morphine ER  •  senna-docusate **AND** polyethylene glycol/lytes **AND** magnesium hydroxide **AND** bisacodyl  •  labetalol  •  ondansetron  •  ondansetron  •  promethazine  •  promethazine  •  prochlorperazine  •  acetaminophen  •  enoxaparin (LOVENOX) injection    Labs:  Recent Labs      07/04/18   0841  07/05/18 0010  07/06/18   0055   WBC  21.8*  24.9*  25.3*   RBC  2.85*  2.88*  2.79*   HEMOGLOBIN  9.4*  9.2*  9.1*   HEMATOCRIT  29.1*  29.1*  28.0*   MCV  102.1*  101.0*  100.4*   MCH  33.0  31.9  32.6   RDW  72.2*  71.2*  73.2*   PLATELETCT  337  403  403   MPV  9.4  9.8  9.7   NEUTSPOLYS  81.50*  80.80*  88.80*   LYMPHOCYTES  5.90*  7.00*  4.30*   MONOCYTES  3.40  8.60  5.20   EOSINOPHILS  0.00  0.70  0.80   BASOPHILS  3.40*  0.60  0.00   RBCMORPHOLO  Present   --   Present     Recent Labs      07/05/18   0010  07/06/18   0055   SODIUM  133*  138   POTASSIUM  3.2*  3.1*   CHLORIDE  98  97   CO2  25  29   GLUCOSE  102*  105*   BUN  5*  6*     Recent Labs      07/05/18   0010  07/06/18   0055   CREATININE  0.49*  0.57       Imaging:  Ct-chest (thorax) W/o    Result Date: 7/2/2018 7/2/2018 6:10 PM HISTORY/REASON FOR EXAM:  Abnormal chest x-ray, metastatic cholangiocarcinoma. TECHNIQUE/EXAM DESCRIPTION: CT scan of the chest without contrast. Noncontrast helical scanning of the chest was obtained from the lung apices through the adrenal glands. Low dose optimization  technique was utilized for this CT exam including automated exposure control and adjustment of the mA and/or kV according to patient size. COMPARISON: Chest x-ray 7/2/2018, PET/CT 5/21/2018 FINDINGS: Thoracic aorta is normal in caliber. Coronary artery calcifications. RIGHT chest infusion port. Small bilateral pleural effusions, slightly larger on the RIGHT. Mild emphysematous changes. Ill-defined opacity of the LEFT lung base, primarily LEFT lower lobe. Bibasilar atelectasis. No pneumothorax. Visualized liver is enlarged and shows numerous low-density lesions consistent with metastasis.     1.  Small bilateral pleural effusions with associated atelectasis. 2.  Findings concerning for superimposed LEFT lower lobe pneumonia. 3.  Mild emphysema. 4.  No pneumothorax. 5.  Extensive hepatic metastatic disease again seen.     Ct-head W/o    Result Date: 6/23/2018 6/23/2018 2:34 AM HISTORY/REASON FOR EXAM:  Head Injury Ground-level fall TECHNIQUE/EXAM DESCRIPTION AND NUMBER OF VIEWS: CT of the head without contrast. The study was performed on a helical multidetector CT scanner. Contiguous 2.5 mm axial sections were obtained from the skull base through the vertex. Up to date radiation dose reduction adjustments have been utilized to meet ALARA standards for radiation dose reduction. COMPARISON:  None available FINDINGS: There is no evidence of acute intracranial hemorrhage, mass, mass-effect or shift of midline structures. Gray-white matter differentiation is grossly preserved. Ventricle size and brain parenchymal volume are appropriate for this patient's stated age. The basal cisterns are patent. There are no abnormal extra-axial fluid collections. No depressed or widely  calvarial fracture is seen. The visualized paranasal sinuses and temporal bone structures are aerated. ___________________________________     1. No acute intracranial abnormality. No evidence of acute intracranial hemorrhage or mass lesion.      Dx-chest-2 Views    Result Date: 6/30/2018 6/30/2018 11:49 AM HISTORY/REASON FOR EXAM: Right epigastric pain, liver carcinoma. TECHNIQUE/EXAM DESCRIPTION AND NUMBER OF VIEWS: Two views of the chest. COMPARISON:  June 23 FINDINGS: Right internal jugular leonardo catheter tip overlies the SVC. Cardiomediastinal contours are stable given new left subpulmonic pleural fluid and/or basilar atelectasis/consolidation which effaces the left heart border. Diminished right lung volume with possible subpulmonic fluid. Slight patchy opacity above the level of the leonardo catheter laterally Decreasing lung volumes     Decreasing lung volumes with possible new subpulmonic fluid especially in the left and there is adjacent atelectasis or consolidation    Dx-chest-2 Views    Result Date: 6/4/2018 6/4/2018 5:13 PM HISTORY/REASON FOR EXAM:  Increasing weakness. Metastatic ovarian cancer. TECHNIQUE/EXAM DESCRIPTION AND NUMBER OF VIEWS: Two views of the chest. COMPARISON:  5/9/2018 FINDINGS: There is a Port-A-Cath in place with the tip projecting over the SVC. Port projects over the right chest wall. The mediastinal and cardiac silhouette is unremarkable. The pulmonary vascularity is within normal limits. There is minimal linear opacity in the left lower lobe. There is a small amount of bilateral dependent pleural fluid. There is no visible pneumothorax. There are no acute bony abnormalities.     1.  There is no significant change in linear left lower lobe atelectasis or pneumonitis with a trace of adjacent fluid. 2.  There is a trace of right CP angle fluid. Findings were discussed with JEANINE VELEZ on 6/4/2018 at 5:34 PM.    Dx-chest-limited (1 View)    Result Date: 7/2/2018 7/2/2018 1:35 PM HISTORY/REASON FOR EXAM:  Status post left thoracentesis. Evaluate for pneumothorax. TECHNIQUE/EXAM DESCRIPTION AND NUMBER OF VIEWS: Single AP view of the chest. COMPARISON: 6/30/2018 FINDINGS: Lines/tubes:  Right IJV Port-A-Cath remains  in place.. Lungs:  No pulmonary infiltrates or consolidations are noted. Pleura:  No pneumothorax is identified following left thoracentesis. No significant residual left pleural effusion is identified. Heart and mediastinum:  The heart silhouette is within normal limits. Bones:  Negative.     No pneumothorax following left thoracentesis.    Dx-chest-portable (1 View)    Result Date: 6/23/2018 6/23/2018 2:21 AM HISTORY/REASON FOR EXAM:  Altered mental status TECHNIQUE/EXAM DESCRIPTION AND NUMBER OF VIEWS: Single portable view of the chest. COMPARISON: 6/4/2018 FINDINGS: Right chest port in place. No pulmonary infiltrates or consolidations are noted. No pleural effusion. No pneumothorax. Stable cardiopericardial silhouette.     1. No acute cardiopulmonary abnormalities are identified.    Mr-brain-with & W/o    Result Date: 6/5/2018 6/4/2018 4:58 PM HISTORY/REASON FOR EXAM:  Evaluate for metastasis. TECHNIQUE/EXAM DESCRIPTION:   MRI of the brain without and with contrast. T1 sagittal, T2 fast spin-echo axial, T1 coronal, FLAIR coronal, diffusion-weighted and apparent diffusion coefficient (ADC map) axial images were obtained of the whole brain. T1 postcontrast axial and T1 postcontrast coronal images were obtained. The study was performed on a [employer] Signa 1.5 Isabell MRI scanner. 12 mL Omniscan contrast was administered intravenously. COMPARISON:  2/24/2016 FINDINGS: There is no evidence of abnormal meningeal or parenchymal enhancing lesion. The skull bones appear normal. The extracranial soft tissue including orbits appear grossly normal.   There is no large lesion identified in the expected course of the intracranial portions of the cranial nerves. There is no acute infarct. There is no acute or chronic parenchymal hemorrhage. A few nonspecific T2 hyperintensities are noted in the supratentorial white matter and brainstem. There is no intra-axial space-occupying lesion. There is no sellar or juxtasellar lesion. There  is no extra-axial fluid collection, hemorrhage or mass. The visualized flow voids of the cerebral vasculature are unremarkable.     1.  There is no evidence of intracranial metastasis. 2.  Mild cerebral atrophy. 3.  Mild chronic microvascular ischemic disease. 4.  There has been no significant interval change.    Us-gallbladder    Result Date: 7/2/2018 7/2/2018 6:08 AM HISTORY/REASON FOR EXAM:  Leukocytosis. History of cholangiocarcinoma. TECHNIQUE/EXAM DESCRIPTION AND NUMBER OF VIEWS:  Real-time sonography of the gallbladder. COMPARISON: 11/9/2017 FINDINGS: There is no ascites. The liver is normal in contour. The solid mass is present in the inferior aspect of the right lobe of the liver laterally measuring 2.3 x 1.5 x 2.1 cm. This may represent residual or recurrent hepatic neoplasm. The major of the liver is diffusely heterogeneous and diffuse hepatic metastatic involvement cannot be excluded. The liver is enlarged and measures 26.24 cm. The gallbladder contains sludge and a polyp. No stones are identified. The gallbladder wall thickness measures 0.29 cm There is no pericholecystic fluid. The common duct measures 0.41 cm. The visualized pancreas is unremarkable. The visualized aorta is normal in caliber. Intrahepatic IVC is patent. The portal vein is patent with hepatopetal flow. The right kidney measures 13.23 cm. Several simple renal cysts are present. The largest is in the upper pole measuring 2.1 cm in diameter.     1.  Enlarged liver which is diffusely heterogeneous. This could represent diffuse metastatic involvement or spread of cholangiocarcinoma. One residual round mass is identified in the inferior aspect of the right lobe of liver measuring 2.3 cm in diameter. 2.  Sludge and polyp within the gallbladder. No cholelithiasis or biliary dilatation. 3.  Pancreas not visualized. 4.  No right hydronephrosis. 5.  No ascites or portal venous flow reversal.    Us-thoracentesis Puncture Left    Result Date:  "7/2/2018 7/2/2018 12:56 PM HISTORY/REASON FOR EXAM:  Left pleural effusion TECHNIQUE/EXAM DESCRIPTION: Ultrasound-guided thoracentesis. Indication:  LEFT pleural fluid collection. COMPARISON:  None PROCEDURE:     Informed consent was obtained. A timeout was taken. A left pleural effusion was localized with real-time ultrasound guidance. The left posterior chest wall was prepped and draped in a sterile manner. Following local anesthesia with 1% lidocaine, a 5 Swedish Yueh pigtail catheter was advanced into the pleural space with trocar technique and pleural fluid was drained. The patient tolerated the procedure well without evidence of complication. A post thoracentesis chest radiograph is forthcoming. FINDINGS: Left pleural effusion Fluid was sent to the laboratory. 360 mL sent to laboratory for analysis Fluid character: straw colored     1. Ultrasound guided left sided diagnostic and therapeutic thoracentesis. 2. 360 mL of fluid withdrawn.      Micro:  Results     Procedure Component Value Units Date/Time    BLOOD CULTURE [[id number]] Collected:  06/30/18 0837    Order Status:  Completed Specimen:  Blood from Peripheral Updated:  07/05/18 1100     Significant Indicator NEG     Source BLD     Site PERIPHERAL     Blood Culture No growth after 5 days of incubation.    Narrative:       Per Hospital Policy: Only change Specimen Src: to \"Line\" if  specified by physician order.    BLOOD CULTURE [[id number]] Collected:  06/30/18 0837    Order Status:  Completed Specimen:  Blood from Peripheral Updated:  07/05/18 1100     Significant Indicator NEG     Source BLD     Site PERIPHERAL     Blood Culture No growth after 5 days of incubation.    Narrative:       Per Hospital Policy: Only change Specimen Src: to \"Line\" if  specified by physician order.    FUNGAL CULTURE [[id number]] Collected:  07/02/18 1335    Order Status:  Completed Specimen:  Body Fluid Updated:  07/05/18 0821     Significant Indicator NEG     Source BF     Site " Pleural Fluid/left     Fungal Culture Culture in progress.    Narrative:       Lt pleural fluid    FLUID CULTURE W/GRAM STAIN [[id number]] Collected:  07/02/18 1335    Order Status:  Completed Specimen:  Body Fluid Updated:  07/05/18 0821     Gram Stain Result No organisms seen.     Significant Indicator NEG     Source BF     Site Pleural Fluid/left     Culture Result Bdf No growth at 72 hours.    Narrative:       Lt pleural fluid    GRAM STAIN [[id number]] Collected:  07/02/18 1335    Order Status:  Completed Specimen:  Body Fluid Updated:  07/02/18 2321     Significant Indicator .     Source BF     Site Pleural Fluid/left     Gram Stain Result No organisms seen.    Narrative:       Lt pleural fluid    MRSA BY PCR (AMP) [[id number]] Collected:  07/02/18 1427    Order Status:  Completed Specimen:  Respirate from Nares Updated:  07/02/18 1640     Significant Indicator NEG     Source RESP     Site NARES     MRSA PCR Negative for MRSA by PCR.    Narrative:       Collected By:[id number] FLASH MULLERCHERYLZKA    FLUID CULTURE W/GRAM STAIN [[id number]]     Order Status:  Canceled Specimen:  Body Fluid from Thoracentesis Fluid     FUNGAL CULTURE [[id number]]     Order Status:  Canceled Specimen:  Body Fluid from Thoracentesis Fluid     CULTURE RESP W/O GRAM STAIN [[id number]]     Order Status:  Completed Specimen:  Respirate from Sputum     URINALYSIS [[id number]]  (Abnormal) Collected:  06/30/18 0950    Order Status:  Completed Specimen:  Urine from Urine, Clean Catch Updated:  06/30/18 1009     Color DK Yellow     Character Turbid (A)     Specific Gravity 1.021     Ph 5.0     Glucose Negative mg/dL      Ketones Trace (A) mg/dL      Protein 30 (A) mg/dL      Bilirubin Small (A)     Urobilinogen, Urine 1.0     Nitrite Negative     Leukocyte Esterase Trace (A)     Occult Blood Negative     Micro Urine Req Microscopic    Narrative:       Collected By:[id number] TRISHA ANGUIANO          Assessment:  Active Hospital Problems     Diagnosis   • *Acute encephalopathy [G93.40]   • Acute cystitis without hematuria [N30.00]   • Cholangiocarcinoma metastatic to liver (HCC) [C22.1, C78.7]   • HCAP (healthcare-associated pneumonia) [J18.9]   • Hypokalemia [E87.6]   • Hyponatremia [E87.1]   • Anemia associated with chemotherapy [D64.81, T45.1X5A]   • Dehydration [E86.0]   • Depression [F32.9]   • Neuropathy (HCC) [G62.9]       Plan:  Pneumonia  The CT scan done on 7/2/2018 showed left lower lobe pneumonia  Currently on Zosyn.  Discontinue after today's dose  The pleural fluid was drained and culture is negative-pathology is negative for malignancy    Leukocytosis  Multifactorial  Likely due to her underlying malignancy    Metastatic cholangiocarcinoma    Prognosis  Guarded  ID will sign off. Please call as needed.  Discussed with internal medicine.

## 2018-07-06 NOTE — PROGRESS NOTES
Assumed care of pt @0700. Bedside report received. Pt AOX 4. Pt in pain crisis this morning. MD notified. Oxycodone 10 mg and Dilaudid 0.25 mg given. Pt complains about nausea. Zofran given. Port patent with positive blood return running IV abx. Pt up with standby assist. Bed alarm on. Fall precaution in place. POC discussed with pt, all questions answered at this time. Pt makes needs known, call light within reach, hourly rounding in place.

## 2018-07-06 NOTE — CARE PLAN
Problem: Safety  Goal: Will remain free from injury  Outcome: PROGRESSING AS EXPECTED  Bed alarm on, A&O x4, slipper socks, bed locked and in low position, call light and personal belongings with reach, report given to CNA, appropriate signs outside door, hourly rounding in place.     Problem: Bowel/Gastric:  Goal: Normal bowel function is maintained or improved  Outcome: PROGRESSING SLOWER THAN EXPECTED  Pt complains about nausea. Zofran given.     Problem: Pain Management  Goal: Pain level will decrease to patient's comfort goal  Outcome: PROGRESSING SLOWER THAN EXPECTED  Pt in rated pain 10/10 this morning. Med per MAR. MD notified.     Problem: Mobility  Goal: Risk for activity intolerance will decrease  Outcome: PROGRESSING AS EXPECTED  Pt up with standby assist. Pt ambulated to the bathroom.

## 2018-07-07 NOTE — CARE PLAN
Problem: Pain Management  Goal: Pain level will decrease to patient's comfort goal    Intervention: Follow pain managment plan developed in collaboration with patient and Interdisciplinary Team  Patient complaining about abdominal pain. Patient given heat pack and medicated for pain per MAR.      Problem: Mobility  Goal: Risk for activity intolerance will decrease    Intervention: Encourage patient to increase activity level in collaboration with Interdisciplinary Team  Patient assisted to bathroom (standby), patient calls appropriately for assistance when needed.

## 2018-07-07 NOTE — PROGRESS NOTES
[facility] Hospitalist Progress Note    Date of Service: 7/6/2018    Chief Complaint  63 y.o. female admitted 6/23/2018 with altered mental status, dehydration, UTI and PNA with known cholangiocarcinoma.    Interval Problem Update  7/3 Patient states she feels much better than earlier in her admission.  Her mentation is back to baseline and she denies any shortness of breath.  360cc thoracentesis yesterday without significant change in her breathing per patient.  Continue IV abx.  7/4 Patient states she feels very full today, skin stretched on BLE and abdomen - has gone from dehydrated to volume overload and now needs diuresis - she had responded well to this in the past - will schedule bid until less edematous.  WBC dropped slightly overnight, afebrile.  7/5 Patient feeling tired this am from frequent urination from lasix.  Her abdomen is much less distended and pain has lessened.  Her legs are still edematous but have improved.  WBC climbed but patient afebrile and no sign of worsening infection.  After examination RN reported to me patient had fall and hit head and complained of chest pain thereafter, stat CT head and CXR unremarkable for acute injury and EKG showed sinus tachycardia.  BP is trending slightly low and she is diuresing well - will cut back dose tomorrow am - has already received the lasix doses today.  7/6 Patient states she feels okay but fatigued.  She has had lower blood pressures today with increased pain medication to home dose and diuresis.  I've discontinued lasix.  Will continue to monitor.  ID discontinuing antibiotics today and agrees increased WBC is likely due to cancer, not infection.  Once patient feels stronger, anticipate discharge home with outpatient oncology follow up.    Consultants/Specialty  ID - Alexey - s/o    Disposition  Home with  when appropriate.        Review of Systems   Constitutional: Negative for chills and fever.   HENT: Negative for congestion.    Eyes: Negative  for blurred vision and photophobia.   Respiratory: Negative for cough and shortness of breath.    Cardiovascular: Positive for chest pain. Negative for claudication and leg swelling.   Gastrointestinal: Positive for abdominal pain. Negative for constipation, diarrhea, heartburn, nausea and vomiting.   Genitourinary: Negative for dysuria and hematuria.   Musculoskeletal: Negative for joint pain and myalgias.   Skin: Negative for itching and rash.   Neurological: Negative for dizziness, sensory change, speech change, weakness and headaches.   Psychiatric/Behavioral: Negative for depression. The patient is not nervous/anxious and does not have insomnia.       Physical Exam  Laboratory/Imaging   Hemodynamics  Temp (24hrs), Av.1 °C (98.7 °F), Min:36.8 °C (98.2 °F), Max:37.3 °C (99.2 °F)   Temperature: 36.8 °C (98.3 °F)  Pulse  Av.9  Min: 67  Max: 117    Blood Pressure: (!) 89/57      Respiratory      Respiration: 16, Pulse Oximetry: 93 %     Work Of Breathing / Effort: Shallow       Fluids    Intake/Output Summary (Last 24 hours) at 18 1728  Last data filed at 18 1400   Gross per 24 hour   Intake              580 ml   Output                0 ml   Net              580 ml       Nutrition  Orders Placed This Encounter   Procedures   • Diet Order Regular     Standing Status:   Standing     Number of Occurrences:   1     Order Specific Question:   Diet:     Answer:   Regular [1]     Physical Exam   Constitutional: She is oriented to person, place, and time. She appears well-developed and well-nourished. No distress.   HENT:   Head: Normocephalic and atraumatic.   Eyes: Conjunctivae are normal. No scleral icterus.   Neck: Neck supple. No JVD present.   Cardiovascular: Normal rate, regular rhythm and normal heart sounds.  Exam reveals no gallop and no friction rub.    No murmur heard.  Pulmonary/Chest: Effort normal and breath sounds normal. No respiratory distress. She has no wheezes. She exhibits no  tenderness.        Abdominal: Soft. Bowel sounds are normal. She exhibits no distension. There is no tenderness (abdomen soft). There is no guarding.   Musculoskeletal: She exhibits edema (BLE - improved). She exhibits no tenderness.   Neurological: She is alert and oriented to person, place, and time. No cranial nerve deficit.   Skin: Skin is warm and dry. She is not diaphoretic. No erythema. No pallor.   Psychiatric: She has a normal mood and affect. Her behavior is normal.   Nursing note and vitals reviewed.      Recent Labs      07/04/18   0841  07/05/18   0010  07/06/18   0055   WBC  21.8*  24.9*  25.3*   RBC  2.85*  2.88*  2.79*   HEMOGLOBIN  9.4*  9.2*  9.1*   HEMATOCRIT  29.1*  29.1*  28.0*   MCV  102.1*  101.0*  100.4*   MCH  33.0  31.9  32.6   MCHC  32.3*  31.6*  32.5*   RDW  72.2*  71.2*  73.2*   PLATELETCT  337  403  403   MPV  9.4  9.8  9.7     Recent Labs      07/05/18   0010  07/06/18   0055   SODIUM  133*  138   POTASSIUM  3.2*  3.1*   CHLORIDE  98  97   CO2  25  29   GLUCOSE  102*  105*   BUN  5*  6*   CREATININE  0.49*  0.57   CALCIUM  8.6  8.4*                      Assessment/Plan     * Acute encephalopathy   Assessment & Plan    resolved, likely due to infection.          Acute cystitis without hematuria   Assessment & Plan    Likely the primary source of encephalopathy  Completed 6 days of levaquin, urine cx neg.        Cholangiocarcinoma metastatic to liver (HCC)- (present on admission)   Assessment & Plan    Started chemotherapy with Dr Aleman 3 weeks ago.  Will need to f/u with Dr Aleman when d/c.           Intractable abdominal pain- (present on admission)   Assessment & Plan    Chronic issue  Responding well to current regimen.          Fall   Assessment & Plan    7/5/18  STAT CT head - normal  CXR - improved pneumonia, small pleural effusion.          Bilateral leg edema   Assessment & Plan    20# weight gain since admission including BLE edema along with abdomen  Improved, dc lasix due to  dropping bp.            HCAP (healthcare-associated pneumonia)   Assessment & Plan    Zosyn completed 7/6  ID consultation  s/p thoracentesis 7/2/18 with 360cc removed  Culture from this pending  Increased WBC following thoracentesis - likely reactive rather than worsening infection.        Hypokalemia   Assessment & Plan    Replacing as needed          Anemia associated with chemotherapy   Assessment & Plan    Chemotherapy/cancer induced.  Hb stable. No signs of active bleeding            Hyponatremia   Assessment & Plan    Resolved          Dehydration- (present on admission)   Assessment & Plan    Resolved.          Depression- (present on admission)   Assessment & Plan    on wellbutrin.          Neuropathy (HCC)- (present on admission)   Assessment & Plan    Continue neurontin.            Quality-Core Measures   Reviewed items::  Labs reviewed, Medications reviewed and Radiology images reviewed  Duarte catheter::  No Duarte  DVT prophylaxis pharmacological::  Enoxaparin (Lovenox)  Antibiotics:  Treating active infection/contamination beyond 24 hours perioperative coverage

## 2018-07-07 NOTE — CARE PLAN
Problem: Safety  Goal: Will remain free from falls  Outcome: PROGRESSING AS EXPECTED  Fall precautions in place: treaded slipper socks on, mobility signs posted, hourly rounding, bed in lowest position, belongings and call light are within reach, bed alarm on, and near nurses station.    Problem: Pain Management  Goal: Pain level will decrease to patient's comfort goal  Outcome: PROGRESSING AS EXPECTED  Pain managed well with PRN medication at this time.  Patient uses pharmacological and non pharmacological pain-relief strategies. Patient displays improvement in mood, coping.

## 2018-07-07 NOTE — PROGRESS NOTES
[facility] Hospitalist Progress Note    Date of Service: 7/7/2018    Chief Complaint  63 y.o. female admitted 6/23/2018 with altered mental status, dehydration, UTI and PNA with known cholangiocarcinoma.    Interval Problem Update  7/3 Patient states she feels much better than earlier in her admission.  Her mentation is back to baseline and she denies any shortness of breath.  360cc thoracentesis yesterday without significant change in her breathing per patient.  Continue IV abx.  7/4 Patient states she feels very full today, skin stretched on BLE and abdomen - has gone from dehydrated to volume overload and now needs diuresis - she had responded well to this in the past - will schedule bid until less edematous.  WBC dropped slightly overnight, afebrile.  7/5 Patient feeling tired this am from frequent urination from lasix.  Her abdomen is much less distended and pain has lessened.  Her legs are still edematous but have improved.  WBC climbed but patient afebrile and no sign of worsening infection.  After examination RN reported to me patient had fall and hit head and complained of chest pain thereafter, stat CT head and CXR unremarkable for acute injury and EKG showed sinus tachycardia.  BP is trending slightly low and she is diuresing well - will cut back dose tomorrow am - has already received the lasix doses today.  7/6 Patient states she feels okay but fatigued.  She has had lower blood pressures today with increased pain medication to home dose and diuresis.  I've discontinued lasix.  Will continue to monitor.  ID discontinuing antibiotics today and agrees increased WBC is likely due to cancer, not infection.  Once patient feels stronger, anticipate discharge home with outpatient oncology follow up.  7/7 Patient state the pain in her abdomen feels worse today despite her normal dose of medication.  Given her continued fatigue following completion of antibiotics, will re-assess with CT abdomen/pelvis and add chest  for complete evaluation.  Patient also asked if she could go home today.  BP is still lower than her baseline, diuresis stopped yesterday.    Consultants/Specialty  ID - Alexey - s/o    Disposition  Home with HH when appropriate.        Review of Systems   Constitutional: Positive for malaise/fatigue. Negative for chills and fever.   HENT: Negative for congestion.    Eyes: Negative for blurred vision and photophobia.   Respiratory: Negative for cough and shortness of breath.    Cardiovascular: Negative for chest pain, claudication and leg swelling.   Gastrointestinal: Positive for abdominal pain. Negative for constipation, diarrhea, heartburn, nausea and vomiting.   Genitourinary: Negative for dysuria and hematuria.   Musculoskeletal: Negative for joint pain and myalgias.   Skin: Negative for itching and rash.   Neurological: Positive for weakness. Negative for dizziness, sensory change, speech change and headaches.   Psychiatric/Behavioral: Negative for depression. The patient is not nervous/anxious and does not have insomnia.       Physical Exam  Laboratory/Imaging   Hemodynamics  Temp (24hrs), Av °C (98.6 °F), Min:36.8 °C (98.3 °F), Max:37.2 °C (98.9 °F)   Temperature: 37.1 °C (98.7 °F)  Pulse  Av.1  Min: 67  Max: 117    Blood Pressure: (!) 93/57      Respiratory      Respiration: 15, Pulse Oximetry: 93 %     Work Of Breathing / Effort: Shallow  RUL Breath Sounds: Clear, RML Breath Sounds: Diminished, RLL Breath Sounds: Diminished, JACQUELYN Breath Sounds: Clear, LLL Breath Sounds: Diminished    Fluids    Intake/Output Summary (Last 24 hours) at 18 1513  Last data filed at 18 1800   Gross per 24 hour   Intake              150 ml   Output                0 ml   Net              150 ml       Nutrition  Orders Placed This Encounter   Procedures   • Diet Order Regular     Standing Status:   Standing     Number of Occurrences:   1     Order Specific Question:   Diet:     Answer:   Regular [1]      Physical Exam   Constitutional: She is oriented to person, place, and time. She appears well-developed and well-nourished. No distress.   HENT:   Head: Normocephalic and atraumatic.   Eyes: Conjunctivae are normal. No scleral icterus.   Neck: Neck supple. No JVD present.   Cardiovascular: Normal rate, regular rhythm and normal heart sounds.  Exam reveals no gallop and no friction rub.    No murmur heard.  Pulmonary/Chest: Effort normal and breath sounds normal. No respiratory distress. She has no wheezes. She exhibits no tenderness.        Abdominal: Soft. Bowel sounds are normal. She exhibits no distension. There is no tenderness (abdomen soft). There is no guarding.   Musculoskeletal: She exhibits edema (BLE - improved). She exhibits no tenderness.   Lymphadenopathy:     She has no cervical adenopathy.   Neurological: She is alert and oriented to person, place, and time. No cranial nerve deficit.   Skin: Skin is warm and dry. She is not diaphoretic. No erythema. No pallor.   Psychiatric: She has a normal mood and affect. Her behavior is normal.   Nursing note and vitals reviewed.      Recent Labs      07/05/18   0010  07/06/18   0055  07/07/18   0028   WBC  24.9*  25.3*  22.6*   RBC  2.88*  2.79*  2.72*   HEMOGLOBIN  9.2*  9.1*  8.8*   HEMATOCRIT  29.1*  28.0*  27.2*   MCV  101.0*  100.4*  100.0*   MCH  31.9  32.6  32.4   MCHC  31.6*  32.5*  32.4*   RDW  71.2*  73.2*  73.4*   PLATELETCT  403  403  418   MPV  9.8  9.7  10.2     Recent Labs      07/05/18   0010  07/06/18   0055  07/07/18   0028   SODIUM  133*  138  135   POTASSIUM  3.2*  3.1*  3.4*   CHLORIDE  98  97  96   CO2  25  29  29   GLUCOSE  102*  105*  100*   BUN  5*  6*  9   CREATININE  0.49*  0.57  0.54   CALCIUM  8.6  8.4*  8.4*                      Assessment/Plan     * Acute encephalopathy   Assessment & Plan    resolved, likely due to infection.          Acute cystitis without hematuria   Assessment & Plan    Likely the primary source of  encephalopathy  Completed 6 days of levaquin, urine cx neg.        Cholangiocarcinoma metastatic to liver (HCC)- (present on admission)   Assessment & Plan    Started chemotherapy with Dr Aleman 3 weeks ago.  Will need to f/u with Dr Aleman when d/c.           Intractable abdominal pain- (present on admission)   Assessment & Plan    Chronic issue  BP trending down, pain increasing  CT chest/abdomen/pelvis with PO contrast only d/t iodine allergy            Fall   Assessment & Plan    7/5/18  STAT CT head - normal  CXR - improved pneumonia, small pleural effusion.          Bilateral leg edema   Assessment & Plan    20# weight gain since admission including BLE edema along with abdomen  Improved, dc lasix due to dropping bp.            HCAP (healthcare-associated pneumonia)   Assessment & Plan    Zosyn completed 7/6  ID consultation  s/p thoracentesis 7/2/18 with 360cc removed  Culture from this pending  Increased WBC following thoracentesis - likely reactive rather than worsening infection.        Hypokalemia   Assessment & Plan    Replacing as needed          Anemia associated with chemotherapy   Assessment & Plan    Chemotherapy/cancer induced.  Hb stable. No signs of active bleeding            Hyponatremia   Assessment & Plan    Resolved          Dehydration- (present on admission)   Assessment & Plan    Resolved.          Depression- (present on admission)   Assessment & Plan    on wellbutrin.          Neuropathy (HCC)- (present on admission)   Assessment & Plan    Continue neurontin.            Quality-Core Measures   Reviewed items::  Labs reviewed, Medications reviewed and Radiology images reviewed  Duarte catheter::  No Duarte  DVT prophylaxis pharmacological::  Enoxaparin (Lovenox)  Antibiotics:  Treating active infection/contamination beyond 24 hours perioperative coverage

## 2018-07-08 NOTE — CONSULTS
"Reason for PC Consult: Advance Care Planning    Consulted by: Dr Melara    Assessment:  General: 63 yr old female admitted on 6/23/18 for UTI.  Hx of Arthritis, Back pain, Caner-cervical approx 30 yrs prior, Indegestion, PNU, Psychiatric problem, Cancer-cholangiocarcinoma, has had one chemo treatment.     Dyspnea: No, currently denies, 92% on 1 liter NC.  Last BM: 07/07/18   Pain: Yes, currently under control.   Depression: Mood appropriate for situation.   Dementia: No      Spiritual:  Is Religious or spirituality important for coping with this illness? No   Has a  or spiritual provider visit been requested? No    Palliative Performance Scale: 60%    Advance Directive: Advance Directive on File   DPOA: Yes, Hanna Collins [phone], daughter-in-law.  POLST: Yes, on file for DNR/DNI with selective treatment, no Tube Feeds or IV fluids.     Code Status: DNR/DNI     Outcome:  Met with the pt at the bedside. Introduced self and the role of Palliative Care. Spoke about pts current cancer diagnosis. Pt currently living with her son, Vincenzo and his wife. Pt states \"I have so many things I want to do with my grand kids....but with that pain,I can't do anything\", pt became tearful. Supported pt and spoke about her pain levels. Pt stated that before today her pain has been truly out of control. Spoke about not making decision during a pain crisis, pt in agreement and stated that she is feeling that the pain is much more tolerable at this time. Tonya stated that the medication change has helped and she hopes to have better control of pain with less drowsiness. Pt became tearful again and stated, \"I think it just  gabino hit me too, it got real.....when I had cancer before I was young, you just don't think the same....\". Spoke about the pt two adult children and her four grandchildren who are her support here locally. Spoke about continuing with treatment once pain is under control. Pt at this time feels that she would " like to continue with treatment and that her largest obstacle is the pain. Pt stated that she trusts Dr Melara implicitly and feels confident with her assistance. Let the pt know that I will continue to remain available for any needs and support. Let Tonya know that she would not be receiving her chemo her but would have that as an outpt. Pt stated that she is eager to feel better and return to home. Contact number provided. Emotional support and empathy provided through out encounter.     Updated: MD    Plan: Will continue to remain available for the pt and family for any needs, questions and support.     Recommendations: I do not recommend an ethics or hospice consult at this time because the pt wishes to continue with treatment at this time. .    Thank you for allowing Palliative Care to participate in this patient's care. Please feel free to call [phone] with any questions or concerns.

## 2018-07-08 NOTE — CARE PLAN
Problem: Safety  Goal: Will remain free from falls    Intervention: Implement fall precautions  Fall precautions in place.       Problem: Pain Management  Goal: Pain level will decrease to patient's comfort goal    Intervention: Follow pain managment plan developed in collaboration with patient and Interdisciplinary Team  Patient medicated for pain per MAR. Non-pharmacological pain relief methods used to remedy pain.

## 2018-07-08 NOTE — PROGRESS NOTES
Assumed care of patient at 1900 after receiving report from day-shift RN.  Pt is A&Ox4, VSS, resting in bed. Pt c/o of right-side abdominal pain, patient given heat-pack. Pt on 1L via nasal canula. Port patent with good return and PIV patent, flushing well and saline locked. POC discussed, family at bedside, pt calls appropriately for assistance, all needs met at this time, will continue to round.

## 2018-07-08 NOTE — PROGRESS NOTES
Patient resting quietly in bed, no S/S of distress, A&Ox4. Denies SOB, chest pain, dizziness. Bed alarm on, call light within reach,  pt calls appropriately and does not get out of bed, up SB with FWW to restroom, IV fluids TKO to Port, 1L NC O2. Bed in lowest position, bed locked, RN and CNA numbers provided, no further needs at this time. No changes from EPIC. Hourly rounding in place.

## 2018-07-08 NOTE — PROGRESS NOTES
[facility] Hospitalist Progress Note    Date of Service: 7/8/2018    Chief Complaint  63 y.o. female admitted 6/23/2018 with altered mental status, dehydration, UTI and PNA with known cholangiocarcinoma.    Interval Problem Update  7/3 Patient states she feels much better than earlier in her admission.  Her mentation is back to baseline and she denies any shortness of breath.  360cc thoracentesis yesterday without significant change in her breathing per patient.  Continue IV abx.  7/4 Patient states she feels very full today, skin stretched on BLE and abdomen - has gone from dehydrated to volume overload and now needs diuresis - she had responded well to this in the past - will schedule bid until less edematous.  WBC dropped slightly overnight, afebrile.  7/5 Patient feeling tired this am from frequent urination from lasix.  Her abdomen is much less distended and pain has lessened.  Her legs are still edematous but have improved.  WBC climbed but patient afebrile and no sign of worsening infection.  After examination RN reported to me patient had fall and hit head and complained of chest pain thereafter, stat CT head and CXR unremarkable for acute injury and EKG showed sinus tachycardia.  BP is trending slightly low and she is diuresing well - will cut back dose tomorrow am - has already received the lasix doses today.  7/6 Patient states she feels okay but fatigued.  She has had lower blood pressures today with increased pain medication to home dose and diuresis.  I've discontinued lasix.  Will continue to monitor.  ID discontinuing antibiotics today and agrees increased WBC is likely due to cancer, not infection.  Once patient feels stronger, anticipate discharge home with outpatient oncology follow up.  7/7 Patient state the pain in her abdomen feels worse today despite her normal dose of medication.  Given her continued fatigue following completion of antibiotics, will re-assess with CT abdomen/pelvis and add chest  for complete evaluation.  Patient also asked if she could go home today.  BP is still lower than her baseline, diuresis stopped yesterday.   Patient very tearful today, states she feels like she's ready to give up because the pain is so bad.  We discussed no acute findings on her CT from yesterday and her blood pressure has improved to point I can increase the doses of her pain medications.  She has had one cycle of chemotherapy and would not have improvement in tumor burden yet.  Will increase dose of ms contin and oxycodone and PRN dilaudid.  Explained to patient not to give up yet and to make the decision to stop treatment of the cancer after her pain improved.  This being said will also request Palliative care revisit with her for assistance in patient's goals of care.    Consultants/Specialty  ID - Alexey - s/o    Disposition  Home with HH when appropriate.        Review of Systems   Constitutional: Positive for malaise/fatigue. Negative for chills and fever.   HENT: Negative for congestion.    Eyes: Negative for blurred vision and photophobia.   Respiratory: Negative for cough and shortness of breath.    Cardiovascular: Negative for chest pain, claudication and leg swelling.   Gastrointestinal: Positive for abdominal pain. Negative for constipation, diarrhea, heartburn, nausea and vomiting.   Genitourinary: Negative for dysuria and hematuria.   Musculoskeletal: Negative for joint pain and myalgias.   Skin: Negative for itching and rash.   Neurological: Positive for weakness. Negative for dizziness, sensory change, speech change and headaches.   Psychiatric/Behavioral: Negative for depression. The patient is not nervous/anxious and does not have insomnia.       Physical Exam  Laboratory/Imaging   Hemodynamics  Temp (24hrs), Av.1 °C (98.7 °F), Min:36.8 °C (98.2 °F), Max:37.2 °C (99 °F)   Temperature: 37.2 °C (99 °F)  Pulse  Av.2  Min: 67  Max: 117    Blood Pressure: 102/59      Respiratory       Respiration: 17, Pulse Oximetry: 90 %     Work Of Breathing / Effort: Shallow  RUL Breath Sounds: Clear, RML Breath Sounds: Diminished, RLL Breath Sounds: Diminished, JACQUELYN Breath Sounds: Clear, LLL Breath Sounds: Diminished    Fluids    Intake/Output Summary (Last 24 hours) at 07/08/18 1050  Last data filed at 07/07/18 1800   Gross per 24 hour   Intake              150 ml   Output                0 ml   Net              150 ml       Nutrition  Orders Placed This Encounter   Procedures   • Diet Order Regular     Standing Status:   Standing     Number of Occurrences:   1     Order Specific Question:   Diet:     Answer:   Regular [1]     Physical Exam   Constitutional: She is oriented to person, place, and time. She appears well-developed and well-nourished. No distress.   HENT:   Head: Normocephalic and atraumatic.   Eyes: Conjunctivae are normal. No scleral icterus.   Neck: Neck supple. No JVD present.   Cardiovascular: Normal rate, regular rhythm and normal heart sounds.  Exam reveals no gallop and no friction rub.    No murmur heard.  Pulmonary/Chest: Effort normal and breath sounds normal. No respiratory distress. She has no wheezes. She exhibits no tenderness.        Abdominal: Soft. Bowel sounds are normal. She exhibits no distension. There is no tenderness (abdomen soft). There is no guarding.   Musculoskeletal: She exhibits edema (BLE - improved). She exhibits no tenderness.   Lymphadenopathy:     She has no cervical adenopathy.   Neurological: She is alert and oriented to person, place, and time. No cranial nerve deficit.   Skin: Skin is warm and dry. She is not diaphoretic. No erythema. No pallor.   Psychiatric: She has a normal mood and affect. Her behavior is normal.   Nursing note and vitals reviewed.      Recent Labs      07/06/18   0055  07/07/18   0028  07/08/18   0002   WBC  25.3*  22.6*  20.8*   RBC  2.79*  2.72*  2.64*   HEMOGLOBIN  9.1*  8.8*  8.8*   HEMATOCRIT  28.0*  27.2*  26.4*   MCV  100.4*   100.0*  100.0*   MCH  32.6  32.4  33.3*   MCHC  32.5*  32.4*  33.3*   RDW  73.2*  73.4*  73.4*   PLATELETCT  403  418  397   MPV  9.7  10.2  9.9     Recent Labs      07/06/18   0055  07/07/18   0028  07/08/18   0002   SODIUM  138  135  131*   POTASSIUM  3.1*  3.4*  4.2   CHLORIDE  97  96  96   CO2  29  29  31   GLUCOSE  105*  100*  86   BUN  6*  9  9   CREATININE  0.57  0.54  0.36*   CALCIUM  8.4*  8.4*  8.1*                      Assessment/Plan     * Acute encephalopathy   Assessment & Plan    resolved, likely due to infection.          Acute cystitis without hematuria   Assessment & Plan    Likely the primary source of encephalopathy  Completed 6 days of levaquin, urine cx neg.        Cholangiocarcinoma metastatic to liver (HCC)- (present on admission)   Assessment & Plan    Started chemotherapy with Dr Aleman 3 weeks ago.  Will need to f/u with Dr Aleman when d/c.           Intractable abdominal pain- (present on admission)   Assessment & Plan    Chronic issue, currently poor control  BP normalized with stopping diuresis  CT chest/abdomen/pelvis with PO contrast only d/t iodine allergy - no new findings, liver still riddled with tumor but no sign of infection/abscess  MS contin increase from 30 to 60  Oxycodone PRN increased 10-15 PRN  Dilaudid iv increased 0.5-1mg PRN              Fall   Assessment & Plan    7/5/18  STAT CT head - normal  CXR - improved pneumonia, small pleural effusion.          Bilateral leg edema   Assessment & Plan    20# weight gain since admission including BLE edema along with abdomen  Improved, dc lasix due to dropping bp.            HCAP (healthcare-associated pneumonia)   Assessment & Plan    Zosyn completed 7/6  ID consultation  s/p thoracentesis 7/2/18 with 360cc removed  Culture from this pending  Increased WBC following thoracentesis - likely reactive rather than worsening infection.        Hypokalemia   Assessment & Plan    Replacing as needed          Anemia associated with  chemotherapy   Assessment & Plan    Chemotherapy/cancer induced.  Hb stable. No signs of active bleeding            Hyponatremia   Assessment & Plan    Resolved          Dehydration- (present on admission)   Assessment & Plan    Resolved.          Depression- (present on admission)   Assessment & Plan    on wellbutrin.          Neuropathy (HCC)- (present on admission)   Assessment & Plan    Continue neurontin.            Quality-Core Measures   Reviewed items::  Labs reviewed, Medications reviewed and Radiology images reviewed  Duarte catheter::  No Duarte  DVT prophylaxis pharmacological::  Enoxaparin (Lovenox)  Antibiotics:  Treating active infection/contamination beyond 24 hours perioperative coverage

## 2018-07-09 PROBLEM — Z66 DNR (DO NOT RESUSCITATE): Status: ACTIVE | Noted: 2018-01-01

## 2018-07-09 NOTE — PROGRESS NOTES
[facility] Hospitalist Progress Note    Date of Service: 7/9/2018    Chief Complaint  63 y.o. female admitted 6/23/2018 with altered mental status, dehydration, UTI and PNA with known cholangiocarcinoma.    Interval Problem Update  7/3 Patient states she feels much better than earlier in her admission.  Her mentation is back to baseline and she denies any shortness of breath.  360cc thoracentesis yesterday without significant change in her breathing per patient.  Continue IV abx.  7/4 Patient states she feels very full today, skin stretched on BLE and abdomen - has gone from dehydrated to volume overload and now needs diuresis - she had responded well to this in the past - will schedule bid until less edematous.  WBC dropped slightly overnight, afebrile.  7/5 Patient feeling tired this am from frequent urination from lasix.  Her abdomen is much less distended and pain has lessened.  Her legs are still edematous but have improved.  WBC climbed but patient afebrile and no sign of worsening infection.  After examination RN reported to me patient had fall and hit head and complained of chest pain thereafter, stat CT head and CXR unremarkable for acute injury and EKG showed sinus tachycardia.  BP is trending slightly low and she is diuresing well - will cut back dose tomorrow am - has already received the lasix doses today.  7/6 Patient states she feels okay but fatigued.  She has had lower blood pressures today with increased pain medication to home dose and diuresis.  I've discontinued lasix.  Will continue to monitor.  ID discontinuing antibiotics today and agrees increased WBC is likely due to cancer, not infection.  Once patient feels stronger, anticipate discharge home with outpatient oncology follow up.  7/7 Patient state the pain in her abdomen feels worse today despite her normal dose of medication.  Given her continued fatigue following completion of antibiotics, will re-assess with CT abdomen/pelvis and add chest  for complete evaluation.  Patient also asked if she could go home today.  BP is still lower than her baseline, diuresis stopped yesterday.  7/8 Patient very tearful today, states she feels like she's ready to give up because the pain is so bad.  We discussed no acute findings on her CT from yesterday and her blood pressure has improved to point I can increase the doses of her pain medications.  She has had one cycle of chemotherapy and would not have improvement in tumor burden yet.  Will increase dose of ms contin and oxycodone and PRN dilaudid.  Explained to patient not to give up yet and to make the decision to stop treatment of the cancer after her pain improved.  This being said will also request Palliative care revisit with her for assistance in patient's goals of care.  7/9: abdominal pain is better controlled; palliative met with patient, DNR, no hospice yet as she does have additional treatment/chemo as her goal at this time. Discussed with RN, she is still confused and intermittently not as alert. Vitals reviewed no fevers.    Consultants/Specialty  ID - Alexey - s/o    Disposition  Home with HH when appropriate.        Review of Systems   Constitutional: Positive for malaise/fatigue. Negative for chills and fever.   HENT: Negative for congestion.    Eyes: Negative for blurred vision and photophobia.   Respiratory: Negative for cough and shortness of breath.    Cardiovascular: Positive for leg swelling (about the same). Negative for chest pain.   Gastrointestinal: Positive for abdominal pain (better). Negative for constipation, diarrhea, heartburn, nausea and vomiting.   Genitourinary: Negative for dysuria and hematuria.   Musculoskeletal: Negative for joint pain and myalgias.   Skin: Negative for itching and rash.   Neurological: Positive for weakness. Negative for dizziness, sensory change, speech change and headaches.   Psychiatric/Behavioral: Negative for depression. The patient is not nervous/anxious  and does not have insomnia.       Physical Exam  Laboratory/Imaging   Hemodynamics  Temp (24hrs), Av.7 °C (98.1 °F), Min:36.6 °C (97.8 °F), Max:36.8 °C (98.3 °F)   Temperature: 36.6 °C (97.8 °F)  Pulse  Av  Min: 67  Max: 117    Blood Pressure: (!) 99/60      Respiratory      Respiration: 16, Pulse Oximetry: 90 %     Work Of Breathing / Effort: Shallow  RUL Breath Sounds: Clear, RML Breath Sounds: Diminished, RLL Breath Sounds: Diminished, JACQUELYN Breath Sounds: Clear, LLL Breath Sounds: Diminished    Fluids    Intake/Output Summary (Last 24 hours) at 18 1051  Last data filed at 18 0500   Gross per 24 hour   Intake              590 ml   Output                0 ml   Net              590 ml       Nutrition  Orders Placed This Encounter   Procedures   • Diet Order Regular     Standing Status:   Standing     Number of Occurrences:   1     Order Specific Question:   Diet:     Answer:   Regular [1]     Physical Exam   Constitutional: She appears well-developed and well-nourished. No distress.   Eyes: Conjunctivae are normal. No scleral icterus.   Neck: Neck supple. No JVD present.   Cardiovascular: Normal rate, regular rhythm and normal heart sounds.  Exam reveals no gallop and no friction rub.    No murmur heard.  Pulmonary/Chest: Effort normal and breath sounds normal. No respiratory distress. She has no wheezes. She exhibits no tenderness.        Abdominal: Soft. Bowel sounds are normal. She exhibits no distension. There is hepatomegaly. There is tenderness (abdomen soft, mild diffuse tenderness). There is no guarding.   Musculoskeletal: She exhibits edema (BLE - improved, pitting unchanged). She exhibits no tenderness.   Lymphadenopathy:     She has no cervical adenopathy.   Neurological: She is alert. No cranial nerve deficit.   Oriented x 2, confused at times, reorients easily and not agitated.   Skin: Skin is warm and dry. She is not diaphoretic. No erythema. There is pallor.   Psychiatric: She has  a normal mood and affect. Her behavior is normal.   Nursing note and vitals reviewed.      Recent Labs      07/07/18   0028  07/08/18   0002  07/09/18   0200   WBC  22.6*  20.8*  20.9*   RBC  2.72*  2.64*  2.69*   HEMOGLOBIN  8.8*  8.8*  8.9*   HEMATOCRIT  27.2*  26.4*  27.0*   MCV  100.0*  100.0*  100.4*   MCH  32.4  33.3*  33.1*   MCHC  32.4*  33.3*  33.0*   RDW  73.4*  73.4*  76.6*   PLATELETCT  418  397  437   MPV  10.2  9.9  9.8     Recent Labs      07/07/18   0028  07/08/18   0002  07/09/18   0200   SODIUM  135  131*  136   POTASSIUM  3.4*  4.2  5.0   CHLORIDE  96  96  102   CO2  29  31  30   GLUCOSE  100*  86  103*   BUN  9  9  10   CREATININE  0.54  0.36*  0.52   CALCIUM  8.4*  8.1*  8.4*                      Assessment/Plan     * Acute encephalopathy   Assessment & Plan    Worsening again, concern for ongoing infection due to elevated WBC count not appreciably better; will continue workup for infection or other cause of encephalopathy.          Acute cystitis without hematuria   Assessment & Plan    Possibly primary source of encephalopathy? With continued leukocytosis concern for ongoing infection from some other source.  Completed 6 days of levaquin, urine cx neg.        Cholangiocarcinoma metastatic to liver (HCC)- (present on admission)   Assessment & Plan    Started chemotherapy with Dr Aleman 3 weeks prior to admit.  Will need to f/u with Dr Aleman when d/c and acute issues controlled.          Intractable abdominal pain- (present on admission)   Assessment & Plan    Chronic issue, improving with adjustment of medications  BP normalized with stopping diuresis  CT chest/abdomen/pelvis with PO contrast only d/t iodine allergy - no new findings, liver still riddled with tumor but no sign of infection/abscess  MS contin increase from 30 to 60 she states she has much better pain control  Oxycodone PRN increased 10-15 PRN  Dilaudid iv increased 0.5-1mg PRN              DNR (do not resuscitate)   Assessment &  Plan    AD/POLST on file, confirmed during this admission.        Fall   Assessment & Plan    7/5/18  STAT CT head - normal  CXR - improved pneumonia, small pleural effusion.          Bilateral leg edema   Assessment & Plan    20# weight gain since admission including BLE edema along with abdomen  Improved, off lasix now due to dropping bp.            HCAP (healthcare-associated pneumonia)   Assessment & Plan    Zosyn completed 7/6  ID consultation  s/p thoracentesis 7/2/18 with 360cc removed  Culture from this pending  Increased WBC persistent though improving respiratory wise        Hypokalemia   Assessment & Plan    Replacing as needed          Anemia associated with chemotherapy   Assessment & Plan    Chemotherapy/cancer induced.  Hb stable. No signs of active bleeding            Hyponatremia   Assessment & Plan    Resolved          Dehydration- (present on admission)   Assessment & Plan    Resolved.          Leukocytosis- (present on admission)   Assessment & Plan    Significantly elevated 20-22 range. No fevers or chills.  Persistent encephalopathy.  With cancer and implanted port in place consider persistent bacteremia, blood cultures were negative on June 30 but at that point she did not have as significant of a leukocytosis.  Repeat peripheral blood cultures today and monitor results.        Depression- (present on admission)   Assessment & Plan    on wellbutrin.          Neuropathy (HCC)- (present on admission)   Assessment & Plan    Continue neurontin.            Quality-Core Measures   Reviewed items::  Labs reviewed, Medications reviewed and Radiology images reviewed  Duarte catheter::  No Duarte  DVT prophylaxis pharmacological::  Enoxaparin (Lovenox)  Antibiotics:  Treating active infection/contamination beyond 24 hours perioperative coverage

## 2018-07-09 NOTE — CARE PLAN
Problem: Knowledge Deficit  Goal: Knowledge of disease process/condition, treatment plan, diagnostic tests, and medications will improve    Intervention: Assess knowledge level of disease process/condition, treatment plan, diagnostic tests, and medications  Educate patient on POC and goals.

## 2018-07-09 NOTE — CARE PLAN
Problem: Safety  Goal: Will remain free from injury    Intervention: Provide assistance with mobility  Assist patient when ambulating to bathroom.

## 2018-07-09 NOTE — PROGRESS NOTES
Patient A&O x 4, up with standby assist. VS stable. No new complaints of pain overnight. Patient resting comfortably at this time, will continue to monitor.

## 2018-07-09 NOTE — PROGRESS NOTES
Pt alert and oriented x 4 but with periods of confusion and slowed thought processing. Denies nausea but complains of pain of an 8/10 in her abdomen which she states tends to improve after breakfast. Declined intervention other than MS Contin. Abdomen semi-firm, rounded and somewhat distended. Port to R chest flushing well with positive blood return. Fluids infusing at TKO. Up with one, FWW and unsteady gait. Resting comfortably. Call light within reach. Hourly rounding in place.

## 2018-07-09 NOTE — ASSESSMENT & PLAN NOTE
Significantly elevated 20-22 range. No fevers or chills.  Persistent encephalopathy.  With cancer and implanted port in place consider persistent bacteremia, blood cultures were negative on June 30 but at that point she did not have as significant of a leukocytosis.  Repeat peripheral blood cultures today and monitor results.

## 2018-07-10 PROBLEM — E87.1 HYPONATREMIA: Status: RESOLVED | Noted: 2018-01-01 | Resolved: 2018-01-01

## 2018-07-10 PROBLEM — E86.0 DEHYDRATION: Status: RESOLVED | Noted: 2018-01-01 | Resolved: 2018-01-01

## 2018-07-10 NOTE — CARE PLAN
Problem: Safety  Goal: Will remain free from falls  Outcome: PROGRESSING AS EXPECTED  Bed in low, bed alarm in place, call light in reach, and hourly rounding      Problem: Pain Management  Goal: Pain level will decrease to patient's comfort goal  Outcome: PROGRESSING AS EXPECTED

## 2018-07-10 NOTE — CARE PLAN
Problem: Venous Thromboembolism (VTW)/Deep Vein Thrombosis (DVT) Prevention:  Goal: Patient will participate in Venous Thrombosis (VTE)/Deep Vein Thrombosis (DVT)Prevention Measures  Outcome: PROGRESSING AS EXPECTED  Pt is on SQ lovenox  Intervention: Encourage ambulation/mobilization at level directed by Physical Therapy in collaboration with Interdisciplinary Team  Pt ambulates to the bathroom      Problem: Respiratory:  Goal: Respiratory status will improve  Outcome: PROGRESSING AS EXPECTED    Intervention: Administer and titrate oxygen therapy  Pt is still on 1LNC  Intervention: Educate and encourage incentive spirometry usage  Encouraging patient to use IS. Pt pulls about 1000mL

## 2018-07-10 NOTE — DIETARY
"Nutrition services: Day 17 of admit.  Tonya Collins is a 63 y.o. female with admitting DX of AMS, Dehydration, UTI, PNA, Cholangiocarcinoma  -Consult 2' Poor PO noted on screen    Met with patient at bedside to assess appetite, intake, and nutrition status. Pt appears thin and frail with decreased muscle definition in her arms. Patient reports she has not had an appetite for over a month. She states she feels as though she has lost weight but is unsure of her usual body weight. Pt reports she is experiencing taste changes as everything is bland, but states she is fond of the food otherwise. Pt denied any GI distress. Pt's daughter brings in rootbeer daily, for which pt requested to have ice cream with daily to make root beer floats, added accordingly. Discussed importance of PO intake/nutrition with increased protein/kcal needs in order to stabilize weight and preserve lean body mass 2' to hypermetabolic effects of cancer, pt receptive and agreeable to high-protein/ high-kcal milkshakes TID, not agreeable to Boost. Pt denied any further nutrition related questions or concerns    Assessment:  Height: 175.3 cm (5' 9\"), Weight: 67.9 kg (149 lb) via stand-up scale  Body mass index is 22.11 kg/m². (WNL)  Diet/Intake: Regular, PO <50% meals    Evaluation:   1. Pt experiencing taste changes and decreased appetite  2. Pertinent meds: Morphine, Senna. Last BM 7/8    Recommendations/Plan:  1. Added high kcal/high protein milkshakes BID between meals for additional nutrition   2. Added mane with meals to help alleviate taste changes per [facility] nutrition interventions  3. Encourage intake of meals and snacks  4. Document intake of all meals and snacks as % taken in ADL's to provide interdisciplinary communication across all shifts.   5. Monitor weight.  6. Nutrition rep will continue to see patient for ongoing meal and snack preferences.      RD following        "

## 2018-07-10 NOTE — CARE PLAN
Problem: Nutritional:  Goal: Achieve adequate nutritional intake  Patient will consume >50% of meals  Outcome: NOT MET  See RD note

## 2018-07-10 NOTE — THERAPY
"Physical Therapy Treatment completed.   Bed Mobility:  Supine to Sit: Supervised  Transfers: Sit to Stand: Supervised  Gait: Level Of Assist: Supervised with Front-Wheel Walker       Plan of Care: Will benefit from Physical Therapy 3 times per week  Discharge Recommendations: Equipment: No Equipment Needed. Post-acute therapy Discharge to home with outpatient or home health for additional skilled therapy services.     See \"Rehab Therapy-Acute\" Patient Summary Report for complete documentation.     Pt continues to have some decreased overall safety awareness but responds well to verbal cuing. Limited more by abdominal pain level than true weakness or fatigue at this time. Will continue PT for 1-2 more treatments to focus on stair training and additional safety awareness.   "

## 2018-07-10 NOTE — PROGRESS NOTES
"[facility] Hospitalist Progress Note    Date of Service: 7/10/2018    Chief Complaint  63 y.o. female admitted 6/23/2018 with \" a   history of suspected metastatic cholangiocarcinoma, being treated with   chemotherapy under the care of Dr. Aleman.  Last chemotherapy was about 10 days   ago according to her daughter.  Over the past 3 days, the patient has been   having poor intake with nausea, intermittent vomiting and generalized malaise   and fatigue.  She has had multiple falls over the past few days.  No diarrhea.    No melena or rectal bleeding.  No hemetemesis or coffee-ground emesis.  She   has chronic abdominal pain secondary to her cancer which has been at baseline.    She has been having intermittent confusion.  No dysuria.  She has had some   urgency.  No documented fever.  No chills.     According to the daughter, the patient had similar presentation with previous   episodes of dehydration.\"     Interval Problem Update    7/10: los 17. Elevated wbc, blood cx's ngtd. Ordered f/ucxr today. Completed 6 days of levaquin, urine cx are negative. Afebrile. Requiring 1 litre 02.  Patient was on chemo prior to admission, sees . Restart empiric levaquin x 7 more days.     Currently she is laying in bed, has minimal complaints, acknowledges she needs to get stronger. Currently DNR. Per pt patient has poor safety awareness. Recommend inpatient treatment while she is here. Of note lasix held due to low bp's.     Consultants/Specialty  ID- Alexey s/o    Disposition  f/u cxr, monitor for signs of sepsis. Will anticipate home with home health.         Review of Systems   Constitutional: Negative for chills and fever.   HENT: Negative for hearing loss.    Eyes: Negative for blurred vision and double vision.   Respiratory: Negative for cough, hemoptysis and sputum production.    Cardiovascular: Negative for chest pain.   Gastrointestinal: Positive for abdominal pain. Negative for heartburn and nausea.   Genitourinary: " Negative for dysuria and urgency.   Musculoskeletal: Negative for myalgias.   Skin: Negative for rash.   Neurological: Negative for dizziness and headaches.   Endo/Heme/Allergies: Negative for polydipsia. Does not bruise/bleed easily.      Physical Exam  Laboratory/Imaging   Hemodynamics  Temp (24hrs), Av.7 °C (98 °F), Min:36.5 °C (97.7 °F), Max:36.9 °C (98.4 °F)   Temperature: 36.6 °C (97.8 °F)  Pulse  Av  Min: 67  Max: 117    Blood Pressure: 124/68      Respiratory      Respiration: 20, Pulse Oximetry: 91 %        RUL Breath Sounds: Clear, RML Breath Sounds: Clear, RLL Breath Sounds: Clear, JACQUELYN Breath Sounds: Clear, LLL Breath Sounds: Clear    Fluids    Intake/Output Summary (Last 24 hours) at 07/10/18 1816  Last data filed at 07/10/18 0500   Gross per 24 hour   Intake              516 ml   Output                0 ml   Net              516 ml       Nutrition  Orders Placed This Encounter   Procedures   • Diet Order Regular     Standing Status:   Standing     Number of Occurrences:   1     Order Specific Question:   Diet:     Answer:   Regular [1]     Physical Exam   Constitutional: No distress.   White female, nad   Eyes: Left eye exhibits no discharge.   Neck: Neck supple.   Musculoskeletal:   2+ le edema--pitting b/l       Recent Labs      18   0002  18   0200  07/10/18   0100   WBC  20.8*  20.9*  20.4*   RBC  2.64*  2.69*  2.63*   HEMOGLOBIN  8.8*  8.9*  8.7*   HEMATOCRIT  26.4*  27.0*  27.2*   MCV  100.0*  100.4*  103.4*   MCH  33.3*  33.1*  33.1*   MCHC  33.3*  33.0*  32.0*   RDW  73.4*  76.6*  79.1*   PLATELETCT  397  437  412   MPV  9.9  9.8  10.1     Recent Labs      18   0002  18   0200  07/10/18   0100   SODIUM  131*  136  135   POTASSIUM  4.2  5.0  5.0   CHLORIDE  96  102  101   CO2  31  30  29   GLUCOSE  86  103*  98   BUN  9  10  9   CREATININE  0.36*  0.52  0.49*   CALCIUM  8.1*  8.4*  8.7                      Assessment/Plan     * Acute encephalopathy   Assessment &  Plan    Worsening again, concern for ongoing infection due to elevated WBC count not appreciably better; will continue workup for infection or other cause of encephalopathy.          Acute cystitis without hematuria   Assessment & Plan    Possibly primary source of encephalopathy? With continued leukocytosis concern for ongoing infection from some other source.  Completed 6 days of levaquin, urine cx neg.        Cholangiocarcinoma metastatic to liver (HCC)- (present on admission)   Assessment & Plan    Started chemotherapy with Dr Aleman 3 weeks prior to admit.  Will need to f/u with Dr Aleman when d/c and acute issues controlled.          Intractable abdominal pain- (present on admission)   Assessment & Plan    Chronic issue, improving with adjustment of medications  BP normalized with stopping diuresis  CT chest/abdomen/pelvis with PO contrast only d/t iodine allergy - no new findings, liver still riddled with tumor but no sign of infection/abscess  MS contin increase from 30 to 60 she states she has much better pain control  Oxycodone PRN increased 10-15 PRN  Dilaudid iv increased 0.5-1mg PRN              DNR (do not resuscitate)   Assessment & Plan    AD/POLST on file, confirmed during this admission.        Fall   Assessment & Plan    7/5/18  STAT CT head - normal  CXR - improved pneumonia, small pleural effusion.          Bilateral leg edema   Assessment & Plan    20# weight gain since admission including BLE edema along with abdomen  Improved, off lasix now due to dropping bp.    7/10: will administer lasix with albumin             HCAP (healthcare-associated pneumonia)   Assessment & Plan    Zosyn completed 7/6  ID consultation  s/p thoracentesis 7/2/18 with 360cc removed  Culture from this pending  Increased WBC persistent though improving respiratory wise  ID s/o     Will resume levaquin 750 daily x 7 more days        Hypokalemia   Assessment & Plan    Replacing as needed          Anemia associated with  chemotherapy   Assessment & Plan    Chemotherapy/cancer induced.  Hb stable. No signs of active bleeding            Leukocytosis- (present on admission)   Assessment & Plan    Significantly elevated 20-22 range. No fevers or chills.  Persistent encephalopathy.  With cancer and implanted port in place consider persistent bacteremia, blood cultures were negative on June 30 but at that point she did not have as significant of a leukocytosis.  Repeat peripheral blood cultures today and monitor results.        Depression- (present on admission)   Assessment & Plan    on wellbutrin.          Neuropathy (HCC)- (present on admission)   Assessment & Plan    Continue neurontin.            Quality-Core Measures   Reviewed items::  Labs reviewed, Radiology images reviewed and Medications reviewed  Duarte catheter::  No Duarte

## 2018-07-10 NOTE — PROGRESS NOTES
Received report from day RN. POC discussed with patient, pt verbalizes understanding and agreement.  A&Ox3- disoriented to time, slow to answer some questions. Per pt pain is well controlled with MS contin. Pt is calling appropriately to go to the bathroom, no impulsivity tonight. She did have a good visit with her family this evening. Pt reports her appetite is getting better.  Bed alarm on, call light in reach, bed in low position, Q2 hr rounding. Room near nurses station.

## 2018-07-11 NOTE — PROGRESS NOTES
Rcvd report from AM nurse.  Patient AAOx2-3, periods of confusion.  Bed alarm on and working.  Port flushes w/+ blood return.  IVF infusing TKO.  Urgency when urinating due to medications.  Up 1 person assist.  Lung sounds clear in upper lobes, diminished in lower lobes. Denies nausea and pain currently.  No further needs.

## 2018-07-11 NOTE — PROGRESS NOTES
Received report from day RN. POC discussed with patient, pt verbalizes understanding and agreement.  A&Ox3- disoriented to time, slow to answer some questions and periods of confusion noted. Per pt pain is well controlled with MS contin unless she ambulates or moves then it is worse. Pt reported the fear of being more loopy if she takes the breakthrough pain medication. Pt agreed to take prn oxycodone after education regarding pain crisis and the need for breakthrough pain medication. Pt has been nauseous and vomited twice tonight, prn medications given. Poor po intake today. Legs noticeably more swollen this evening, albumin given per orders. Bed alarm on, call light in reach, bed in low position, Q2 hr rounding. Room near nurses station.

## 2018-07-12 PROBLEM — J18.9 HCAP (HEALTHCARE-ASSOCIATED PNEUMONIA): Status: RESOLVED | Noted: 2018-01-01 | Resolved: 2018-01-01
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
## 2018-07-12 PROBLEM — G93.40 ACUTE ENCEPHALOPATHY: Status: RESOLVED | Noted: 2018-01-01 | Resolved: 2018-01-01

## 2018-07-12 PROBLEM — E87.6 HYPOKALEMIA: Status: RESOLVED | Noted: 2018-01-01 | Resolved: 2018-01-01

## 2018-07-12 NOTE — CARE PLAN
Problem: Safety  Goal: Will remain free from injury  Outcome: PROGRESSING AS EXPECTED  Pt is having frequent need to urinate. Calls appropriately for assistance. Bed alarm is set.     Problem: Infection  Goal: Will remain free from infection  Outcome: PROGRESSING AS EXPECTED  Need for straight cath in place. Sterile technique used.

## 2018-07-12 NOTE — DISCHARGE SUMMARY
"Discharge Summary    CHIEF COMPLAINT ON ADMISSION  Chief Complaint   Patient presents with   • ALOC     Per pt's family, pt's family is concerned pt. has become so dehydrated she has become confused she is hallucinating. Pt. is A&O x 3 confused only to time.   • GLF     Per pt's family pt. has had multipl GLFs. Per report from family pt. did hit back of head but did not have any LOC. Pt. has small superficial lacs on arms bilaterally.       Reason for Admission  Altered Mental status     Admission Date  6/23/2018    CODE STATUS  DNAR/DNI    HPI & HOSPITAL COURSE  This is a 63 y.o. female here with \" a history of suspected metastatic cholangiocarcinoma, being treated with   chemotherapy under the care of Dr. Aleman.  Last chemotherapy was about 10 days ago according to her daughter.  Over the past 3 days, the patient has been having poor intake with nausea, intermittent vomiting and generalized malaise   and fatigue.  She has had multiple falls over the past few days.  No diarrhea. No melena or rectal bleeding.  No hemetemesis or coffee-ground emesis.  She has chronic abdominal pain secondary to her cancer which has been at baseline.  She has been having intermittent confusion.  No dysuria.  She has had some urgency.  No documented fever.  No chills.\"    During hospitalization patient was evaluated for encephalopathy, suspected to be due to underlying cystitis. Associated leukocytosis. CT scan on 7/2/18 was suggestive of pneumonia as well. She also had parapneumonic effusions which were drained, cultures were negative. Cytology negative for malignancy. She was seen by infectious disease and recommended antibiotics temporarily and then discontinued on 7/6. Her ongoing leukocytosis is suspected to be due to underlying malignancy, I did reinitiate oral levaquin over the last few days for any residual infection. CT scan on 7/7 showed:     Small bilateral pleural effusions with overlying atelectasis. Subsegmental right " lower lobe atelectasis.  3.6 mm right middle lobe pulmonary nodule is unchanged.  Emphysematous changes.  Hepatomegaly with innumerable hepatic metastases again noted.  Bilateral renal cysts and small hyperdense renal lesions likely hemorrhagic/proteinaceous cyst. Nonobstructing bilateral renal calculi.  No evidence of bowel obstruction. Colonic diverticulosis.  Small amount of ascites is increased compared to 5/12/2018.  Probable gallbladder sludge. Pericholecystic fluid is seen. Further evaluation can be performed with right upper quadrant ultrasound.  Third spacing is noted.  Small fat-containing right femoral hernia.        Cxr on 7/10 showed:  1.  Persistent hypoinflation and mild bibasilar atelectasis.  2.  Probable minimal bilateral pleural effusions.    She was seen by pt/ot teams. They recommend continuing outpatient pt/ot. Currently patient has on and off abdominal pain due to underlying malignancy. She is currently oriented x3, and was weaned off oxygen. So patient can be safely discharged home with home health services, and needs to resume cancer care outpatient. Overall given history of cholangiocarcinoma with metastasis to liver her long term prognosis is poor.        Therefore, she is discharged in fair and stable condition to home with close outpatient follow-up.    The patient met 2-midnight criteria for an inpatient stay at the time of discharge.    Discharge Date  7/12/18    FOLLOW UP ITEMS POST DISCHARGE  See pcp in 3-5 days for routine post hospital discharge follow up  Complete levaquin 750mg daily x 5 more days  Continue low dose lasix 20 po daily  Recommend potassium 20meq daily  Continue bupropion for depression    DISCHARGE DIAGNOSES  Principal Problem:    Acute encephalopathy POA: Unknown  Active Problems:    Intractable abdominal pain POA: Yes    Cholangiocarcinoma metastatic to liver (HCC) POA: Yes    Acute cystitis without hematuria POA: Unknown    Neuropathy (HCC) POA: Yes     Depression POA: Yes    Leukocytosis POA: Yes    Anemia associated with chemotherapy POA: Unknown    Hypokalemia POA: Unknown    HCAP (healthcare-associated pneumonia) POA: Unknown    Bilateral leg edema POA: Unknown    Fall POA: Unknown    DNR (do not resuscitate) POA: Unknown  Resolved Problems:    Dehydration POA: Yes    Hyponatremia POA: Unknown      FOLLOW UP  No future appointments.  [place] [address] [address] [facility] HospitalBret [place] [address]  [phone]          MEDICATIONS ON DISCHARGE       Medication List      START taking these medications      Instructions   furosemide 40 MG Tabs  Commonly known as:  LASIX   Take 0.5 Tabs by mouth every day.  Dose:  20 mg     levoFLOXacin 750 MG tablet  Start taking on:  7/13/2018  Commonly known as:  LEVAQUIN   Take 1 Tab by mouth every day.  Dose:  750 mg     potassium chloride SA 20 MEQ Tbcr  Commonly known as:  Kdur   Take 1 Tab by mouth every day.  Dose:  20 mEq        CONTINUE taking these medications      Instructions   acyclovir 200 MG Caps  Commonly known as:  ZOVIRAX   Take 200 mg by mouth 2 times a day.  Dose:  200 mg     buPROPion 75 MG Tabs  Commonly known as:  WELLBUTRIN   Take 75 mg by mouth 2 times a day.  Dose:  75 mg     docusate sodium 100 MG Caps  Commonly known as:  COLACE   Take 100 mg by mouth 2 times a day as needed for Constipation.  Dose:  100 mg     gabapentin 300 MG Caps  Commonly known as:  NEURONTIN   Take 300 mg by mouth 3 times a day.  Dose:  300 mg     MS CONTIN 30 MG Tbcr tablet  Generic drug:  morphine ER   Take 30 mg by mouth every 12 hours.  Dose:  30 mg     ondansetron 4 MG Tbdp  Commonly known as:  ZOFRAN ODT   Take 1 Tab by mouth every 6 hours as needed for Nausea.  Dose:  4 mg     oxyCODONE immediate release 10 MG immediate release tablet  Commonly known as:  ROXICODONE   Take 10 mg by mouth 2 times a day as needed for Moderate Pain (breakthrough pain).  Dose:  10 mg     promethazine 25 MG Tabs  Commonly known as:  PHENERGAN    Take 25 mg by mouth every 6 hours as needed for Nausea/Vomiting.  Dose:  25 mg            Allergies  Allergies   Allergen Reactions   • Iodine Anaphylaxis   • Shellfish Allergy Anaphylaxis       DIET  Orders Placed This Encounter   Procedures   • Diet Order Regular     Standing Status:   Standing     Number of Occurrences:   1     Order Specific Question:   Diet:     Answer:   Regular [1]       ACTIVITY  As tolerated.  Weight bearing as tolerated    CONSULTATIONS  ID ()    PROCEDURES  none    LABORATORY  Lab Results   Component Value Date    SODIUM 134 (L) 07/12/2018    POTASSIUM 4.3 07/12/2018    CHLORIDE 99 07/12/2018    CO2 28 07/12/2018    GLUCOSE 104 (H) 07/12/2018    BUN 7 (L) 07/12/2018    CREATININE 0.59 07/12/2018        Lab Results   Component Value Date    WBC 19.9 (H) 07/12/2018    HEMOGLOBIN 8.8 (L) 07/12/2018    HEMATOCRIT 27.0 (L) 07/12/2018    PLATELETCT 406 07/12/2018        Total time of the discharge process exceeds 65 minutes.

## 2018-07-12 NOTE — PROGRESS NOTES
"BP (!) 98/65   Pulse (!) 102   Temp 36.9 °C (98.4 °F)   Resp 18   Ht 1.753 m (5' 9\")   Wt 68.5 kg (151 lb 0.2 oz)   SpO2 94%   Breastfeeding? No   BMI 22.30 kg/m²   Pt is AOx4. Denies SOB, chest pain. Nausea present. Zofran administered per MAR Pain 9/10. Pain medication administered per MAR. POC discussed. Bed is in lowest locked position. Bed alarm set.   "

## 2018-07-12 NOTE — PROGRESS NOTES
"[facility] Hospitalist Progress Note    Date of Service: 7/11/2018    Chief Complaint  63 y.o. female admitted 6/23/2018 with \" a   history of suspected metastatic cholangiocarcinoma, being treated with   chemotherapy under the care of Dr. Aleman.  Last chemotherapy was about 10 days   ago according to her daughter.  Over the past 3 days, the patient has been   having poor intake with nausea, intermittent vomiting and generalized malaise   and fatigue.  She has had multiple falls over the past few days.  No diarrhea.    No melena or rectal bleeding.  No hemetemesis or coffee-ground emesis.  She   has chronic abdominal pain secondary to her cancer which has been at baseline.    She has been having intermittent confusion.  No dysuria.  She has had some   urgency.  No documented fever.  No chills.     According to the daughter, the patient had similar presentation with previous   episodes of dehydration.\"     Interval Problem Update    7/10: los 17. Elevated wbc, blood cx's ngtd. Ordered f/ucxr today. Completed 6 days of levaquin, urine cx are negative. Afebrile. Requiring 1 litre 02.  Patient was on chemo prior to admission, sees . Restart empiric levaquin x 7 more days.      Currently she is laying in bed, has minimal complaints, acknowledges she needs to get stronger. Currently DNR. Per pt patient has poor safety awareness. Recommend inpatient treatment while she is here. Of note lasix held due to low bp's.     7/11: patient has no complaints, encephalopathy is resolved. Per rn decreased uop, bladder scan ordered. Showed retention 256cc. Will cath patient. White count is persistent.     Consultants/Specialty  ID- Alexey s/o    Disposition  Continue diuresis, pt/ot. Target snf        Review of Systems   Constitutional: Negative for chills and fever.   HENT: Negative for hearing loss.    Eyes: Negative for blurred vision.   Respiratory: Negative for cough and hemoptysis.    Cardiovascular: Negative for chest pain " and palpitations.   Gastrointestinal: Negative for abdominal pain, heartburn and nausea.   Genitourinary: Negative for dysuria and urgency.   Musculoskeletal: Negative for myalgias and neck pain.   Skin: Negative for rash.   Neurological: Positive for weakness. Negative for dizziness and headaches.   Endo/Heme/Allergies: Does not bruise/bleed easily.   Psychiatric/Behavioral: Negative for depression.      Physical Exam  Laboratory/Imaging   Hemodynamics  Temp (24hrs), Av.6 °C (97.9 °F), Min:36.4 °C (97.6 °F), Max:36.8 °C (98.3 °F)   Temperature: 36.6 °C (97.8 °F)  Pulse  Av.1  Min: 67  Max: 117    Blood Pressure: 100/50      Respiratory      Respiration: 17, Pulse Oximetry: 95 %     Work Of Breathing / Effort: Shallow  RUL Breath Sounds: Clear, RML Breath Sounds: Diminished, RLL Breath Sounds: Diminished, JACQUELYN Breath Sounds: Clear, LLL Breath Sounds: Diminished    Fluids    Intake/Output Summary (Last 24 hours) at 18 1820  Last data filed at 18 1615   Gross per 24 hour   Intake            330.5 ml   Output              450 ml   Net           -119.5 ml       Nutrition  Orders Placed This Encounter   Procedures   • Diet Order Regular     Standing Status:   Standing     Number of Occurrences:   1     Order Specific Question:   Diet:     Answer:   Regular [1]     Physical Exam   Constitutional: She is oriented to person, place, and time.   White female, stable   Eyes: Left eye exhibits no discharge.   Neck: Neck supple.   Cardiovascular: Normal rate, regular rhythm, normal heart sounds and intact distal pulses.    Pulmonary/Chest: Effort normal and breath sounds normal. No respiratory distress.   Abdominal: Soft. Bowel sounds are normal. She exhibits no distension.   Musculoskeletal: She exhibits edema. She exhibits no tenderness.   Neurological: She is alert and oriented to person, place, and time.   Encephalopathy is resolved   Skin: Skin is warm and dry.       Recent Labs      18   0200   07/10/18   0100  07/11/18   0155   WBC  20.9*  20.4*  19.7*   RBC  2.69*  2.63*  2.57*   HEMOGLOBIN  8.9*  8.7*  8.6*   HEMATOCRIT  27.0*  27.2*  26.8*   MCV  100.4*  103.4*  104.3*   MCH  33.1*  33.1*  33.5*   MCHC  33.0*  32.0*  32.1*   RDW  76.6*  79.1*  80.5*   PLATELETCT  437  412  385   MPV  9.8  10.1  9.9     Recent Labs      07/09/18   0200  07/10/18   0100  07/11/18   0155   SODIUM  136  135  133*   POTASSIUM  5.0  5.0  4.2   CHLORIDE  102  101  98   CO2  30  29  28   GLUCOSE  103*  98  100*   BUN  10  9  8   CREATININE  0.52  0.49*  0.59   CALCIUM  8.4*  8.7  9.2                      Assessment/Plan     * Acute encephalopathy   Assessment & Plan        Resolved 7/10          Acute cystitis without hematuria   Assessment & Plan    Possibly primary source of encephalopathy? With continued leukocytosis concern for ongoing infection from some other source.  Completed 6 days of levaquin, urine cx neg.        Cholangiocarcinoma metastatic to liver (HCC)- (present on admission)   Assessment & Plan    Started chemotherapy with Dr Aleman 3 weeks prior to admit.  Will need to f/u with Dr Aleman when d/c and acute issues controlled.          Intractable abdominal pain- (present on admission)   Assessment & Plan    Chronic issue, improving with adjustment of medications  BP normalized with stopping diuresis  CT chest/abdomen/pelvis with PO contrast only d/t iodine allergy - no new findings, liver still riddled with tumor but no sign of infection/abscess  MS contin increase from 30 to 60 she states she has much better pain control  Oxycodone PRN increased 10-15 PRN  Dilaudid iv increased 0.5-1mg PRN              DNR (do not resuscitate)   Assessment & Plan    AD/POLST on file, confirmed during this admission.        Fall   Assessment & Plan    7/5/18  STAT CT head - normal  CXR - improved pneumonia, small pleural effusion.          Bilateral leg edema   Assessment & Plan    20# weight gain since admission including BLE  edema along with abdomen  Improved, off lasix now due to dropping bp.    7/10: will administer lasix with albumin  7/11: continue lasix, albumin            HCAP (healthcare-associated pneumonia)   Assessment & Plan    Zosyn completed 7/6  ID consultation  s/p thoracentesis 7/2/18 with 360cc removed  Culture from this pending  Increased WBC persistent though improving respiratory wise  ID s/o     Will resume levaquin 750 daily x 7 more days    f/u cxr 7/10 showed: 1.  Persistent hypoinflation and mild bibasilar atelectasis.  2.  Probable minimal bilateral pleural effusions.    Will add is        Hypokalemia   Assessment & Plan    Replacing as needed          Anemia associated with chemotherapy   Assessment & Plan    Chemotherapy/cancer induced.  Hb stable. No signs of active bleeding            Leukocytosis- (present on admission)   Assessment & Plan    Significantly elevated 20-22 range. No fevers or chills.  Persistent encephalopathy.  With cancer and implanted port in place consider persistent bacteremia, blood cultures were negative on June 30 but at that point she did not have as significant of a leukocytosis.  Repeat peripheral blood cultures today and monitor results.        Depression- (present on admission)   Assessment & Plan    on wellbutrin.          Neuropathy (HCC)- (present on admission)   Assessment & Plan    Continue neurontin.            Quality-Core Measures   Reviewed items::  Medications reviewed, Labs reviewed and Radiology images reviewed  DVT prophylaxis pharmacological::  Enoxaparin (Lovenox)

## 2018-07-13 NOTE — DISCHARGE INSTRUCTIONS
Discharge Instructions    Discharged to home by car with relative. Discharged via wheelchair, hospital escort: Yes.  Special equipment needed: Not Applicable    Be sure to schedule a follow-up appointment with your primary care doctor or any specialists as instructed.     Discharge Plan:   Diet Plan: Discussed  Activity Level: Discussed  Confirmed Follow up Appointment: No (Comments)  Confirmed Symptoms Management: Discussed  Medication Reconciliation Updated: No (Comments)  Pneumococcal Vaccine Administered/Refused: Not given - Patient refused pneumococcal vaccine  Influenza Vaccine Indication: Patient Refuses    I understand that a diet low in cholesterol, fat, and sodium is recommended for good health. Unless I have been given specific instructions below for another diet, I accept this instruction as my diet prescription.   Other diet: Regular    Special Instructions: None    · Is patient discharged on Warfarin / Coumadin?   No     Depression / Suicide Risk    As you are discharged from this [facility] facility, it is important to learn how to keep safe from harming yourself.    Recognize the warning signs:  · Abrupt changes in personality, positive or negative- including increase in energy   · Giving away possessions  · Change in eating patterns- significant weight changes-  positive or negative  · Change in sleeping patterns- unable to sleep or sleeping all the time   · Unwillingness or inability to communicate  · Depression  · Unusual sadness, discouragement and loneliness  · Talk of wanting to die  · Neglect of personal appearance   · Rebelliousness- reckless behavior  · Withdrawal from people/activities they love  · Confusion- inability to concentrate     If you or a loved one observes any of these behaviors or has concerns about self-harm, here's what you can do:  · Talk about it- your feelings and reasons for harming yourself  · Remove any means that you might use to hurt yourself (examples: pills,  rope, extension cords, firearm)  · Get professional help from the community (Mental Health, Substance Abuse, psychological counseling)  · Do not be alone:Call your Safe Contact- someone whom you trust who will be there for you.  · Call your local CRISIS HOTLINE 814-9223 or 830-439-2782  · Call your local Children's Mobile Crisis Response Team [place] (000) 449-7313 or www.[employer]  · Call the toll free National Suicide Prevention Hotlines   · National Suicide Prevention Lifeline 463-013-RDRR (4797)  · National Hope Line Network 800-SUICIDE (798-4120)

## 2018-07-15 PROBLEM — A41.9 SEPSIS (HCC): Status: ACTIVE | Noted: 2018-01-01

## 2018-07-15 PROBLEM — G93.40 ACUTE ENCEPHALOPATHY: Status: ACTIVE | Noted: 2018-01-01

## 2018-07-15 PROBLEM — J96.01 ACUTE RESPIRATORY FAILURE WITH HYPOXIA (HCC): Status: ACTIVE | Noted: 2018-01-01

## 2018-07-15 NOTE — ED NOTES
Patient to RD 12 via wheelchair from triage, MAX assist to [place]ston, son at bedside, patient seen by ERP.

## 2018-07-15 NOTE — ED NOTES
Patient resting quietly in bed without distress, denies any complaints or needs at this time.  Call bell within reach.  Waiting for bed placement at this time

## 2018-07-15 NOTE — ED NOTES
First abx complete.  Second abx started.  Patient resting quietly in bed without distress, denies any complaints or needs at this time.  Call bell within reach.  Waiting for inpatient bed at this time.

## 2018-07-15 NOTE — ED PROVIDER NOTES
ED Provider Note    Scribed for Sandra Sorensen M.D. by Mini Mtz. 7/14/2018  6:32 PM    Primary care provider: Tg Goodwin M.D.  Means of arrival: Walk-in  History obtained from: Patient  History limited by: Patient ALOC    CHIEF COMPLAINT  Chief Complaint   Patient presents with   • ALOC     Disoriented per Son.   • Shortness of Breath     SpO2 76% on RA.  Pt does not wear home O2.  Pt placed on 4L NC.        HPI  Tonya Collins is a 63 y.o. female with a history of stage IV liver cancer who presents to the Emergency Department for evaluation of altered mental status and shortness of breath. Her O2 Sat was 76% on room air. She does not wear at-home O2. Patient was admitted to the hospital 6/23 for ALOC and was discharged Thursday, 7/12. Her mental status deteriorated Friday night and she could hardly ambulate to the bathroom. Caretaker is the youngest son. The eldest son who lives in [place] is at bedside.    History limited secondary to ALOC.    REVIEW OF SYSTEMS  PULMONARY: Shortness of breath, hypoxia  Neuro: ALOC    See history of present illness. Review of systems limited secondary to ALOC. C.    PAST MEDICAL HISTORY   has a past medical history of Arthritis; Back pain; Cancer (HCC); Gynecological disorder; Indigestion; Pneumonia; and Psychiatric problem.    SURGICAL HISTORY   has a past surgical history that includes gyn surgery (1980); other; eye surgery (Left, 1968); appendectomy (1984); gastroscopy (4/15/2018); and colonoscopy (4/15/2018).    SOCIAL HISTORY  Social History   Substance Use Topics   • Smoking status: Former Smoker     Packs/day: 0.50     Years: 48.00     Start date: 1/1/1970     Quit date: 4/11/2018   • Smokeless tobacco: Never Used   • Alcohol use No      History   Drug Use     Comment: past marijuana use       FAMILY HISTORY  None noted    CURRENT MEDICATIONS  Home Medications     Reviewed by Gloria Singer (Pharmacy Tech) on 07/14/18 at 2718  Med List Status:  Complete   Medication Last Dose Status   acyclovir (ZOVIRAX) 200 MG Cap 7/14/2018 Active   buPROPion (WELLBUTRIN) 75 MG Tab 7/14/2018 Active   docusate sodium (COLACE) 100 MG Cap 7/13/2018 Active   furosemide (LASIX) 40 MG Tab 7/14/2018 Active   gabapentin (NEURONTIN) 300 MG Cap 7/14/2018 Active   levoFLOXacin (LEVAQUIN) 750 MG tablet 7/14/2018 Active   morphine ER (MS CONTIN) 30 MG Tab CR tablet 7/14/2018 Active   ondansetron (ZOFRAN ODT) 4 MG TABLET DISPERSIBLE 7/14/2018 Active   oxyCODONE immediate release (ROXICODONE) 10 MG immediate release tablet 7/14/2018 Active   potassium chloride SA (KDUR) 20 MEQ Tab CR 7/14/2018 Active   promethazine (PHENERGAN) 25 MG Tab 7/13/2018 Active                ALLERGIES  Allergies   Allergen Reactions   • Iodine Anaphylaxis   • Shellfish Allergy Anaphylaxis       PHYSICAL EXAM  VITAL SIGNS: /80   Pulse (!) 119   Temp 36.8 °C (98.3 °F)   Resp 20   SpO2 92%     Constitutional: Chronically ill-appearing, cachectic   HEENT: Normocephalic, Healing contusion to forehead,  external ears normal, pharynx pink,  Mucous  Membranes dry, No rhinorrhea or mucosal edema  Eyes: PERRL, EOMI, Conjunctiva normal, No discharge.   Neck: Normal range of motion, No tenderness, Supple, No stridor.   Cardiovascular: Tachycardia, Regular Rhythm, No murmurs,  rubs, or gallops.   Thorax & Lungs: Lungs clear to auscultation bilaterally, No respiratory distress, No wheezes, rhales or rhonchi, No chest wall tenderness.   Abdomen: Very enlarged liver on palpation. Diffusely tender.   Skin: Pale  Extremities: 3+ pitting edema to bilateral lower extremities    DIAGNOSTIC STUDIES / PROCEDURES    LABS  Results for orders placed or performed during the hospital encounter of 07/14/18   Lactic acid (lactate)   Result Value Ref Range    Lactic Acid 3.2 (H) 0.5 - 2.0 mmol/L   Lactic acid (lactate)   Result Value Ref Range    Lactic Acid 2.2 (H) 0.5 - 2.0 mmol/L   CBC WITH DIFFERENTIAL   Result Value Ref Range     WBC 24.3 (H) 4.8 - 10.8 K/uL    RBC 3.27 (L) 4.20 - 5.40 M/uL    Hemoglobin 11.1 (L) 12.0 - 16.0 g/dL    Hematocrit 34.4 (L) 37.0 - 47.0 %    .2 (H) 81.4 - 97.8 fL    MCH 33.9 (H) 27.0 - 33.0 pg    MCHC 32.3 (L) 33.6 - 35.0 g/dL    RDW 89.0 (H) 35.9 - 50.0 fL    Platelet Count 446 164 - 446 K/uL    MPV 10.0 9.0 - 12.9 fL    Nucleated RBC 0.00 /100 WBC    NRBC (Absolute) 0.00 K/uL    Neutrophils-Polys 82.60 (H) 44.00 - 72.00 %    Lymphocytes 7.80 (L) 22.00 - 41.00 %    Monocytes 5.20 0.00 - 13.40 %    Eosinophils 0.00 0.00 - 6.90 %    Basophils 0.00 0.00 - 1.80 %    Neutrophils (Absolute) 20.92 (H) 2.00 - 7.15 K/uL    Lymphs (Absolute) 1.90 1.00 - 4.80 K/uL    Monos (Absolute) 1.26 (H) 0.00 - 0.85 K/uL    Eos (Absolute) 0.00 0.00 - 0.51 K/uL    Baso (Absolute) 0.00 0.00 - 0.12 K/uL    Anisocytosis 2+     Macrocytosis 2+    COMP METABOLIC PANEL   Result Value Ref Range    Sodium 135 135 - 145 mmol/L    Potassium 5.0 3.6 - 5.5 mmol/L    Chloride 96 96 - 112 mmol/L    Co2 28 20 - 33 mmol/L    Anion Gap 11.0 0.0 - 11.9    Glucose 89 65 - 99 mg/dL    Bun 12 8 - 22 mg/dL    Creatinine 1.07 0.50 - 1.40 mg/dL    Calcium 9.6 8.5 - 10.5 mg/dL    AST(SGOT) 193 (H) 12 - 45 U/L    ALT(SGPT) 30 2 - 50 U/L    Alkaline Phosphatase 410 (H) 30 - 99 U/L    Total Bilirubin 2.2 (H) 0.1 - 1.5 mg/dL    Albumin 3.7 3.2 - 4.9 g/dL    Total Protein 7.0 6.0 - 8.2 g/dL    Globulin 3.3 1.9 - 3.5 g/dL    A-G Ratio 1.1 g/dL   URINALYSIS   Result Value Ref Range    Color DK Yellow     Character Clear     Specific Gravity 1.013 <1.035    Ph 5.0 5.0 - 8.0    Glucose Negative Negative mg/dL    Ketones Negative Negative mg/dL    Protein Negative Negative mg/dL    Bilirubin Negative Negative    Urobilinogen, Urine 0.2 Negative    Nitrite Negative Negative    Leukocyte Esterase Negative Negative    Occult Blood Negative Negative    Micro Urine Req see below    PROTHROMBIN TIME   Result Value Ref Range    PT 16.5 (H) 12.0 - 14.6 sec    INR 1.36  (H) 0.87 - 1.13   APTT   Result Value Ref Range    APTT 31.2 24.7 - 36.0 sec   TROPONIN   Result Value Ref Range    Troponin I <0.01 0.00 - 0.04 ng/mL   BTYPE NATRIURETIC PEPTIDE   Result Value Ref Range    B Natriuretic Peptide 50 0 - 100 pg/mL   AMMONIA   Result Value Ref Range    Ammonia 40 11 - 45 umol/L   DIFFERENTIAL MANUAL   Result Value Ref Range    Bands-Stabs 3.50 0.00 - 10.00 %    Metamyelocytes 0.90 %    Manual Diff Status PERFORMED    PERIPHERAL SMEAR REVIEW   Result Value Ref Range    Peripheral Smear Review see below    PLATELET ESTIMATE   Result Value Ref Range    Plt Estimation Increased    MORPHOLOGY   Result Value Ref Range    RBC Morphology Present     Polychromia 1+     Poikilocytosis 1+     Target Cells 1+    ESTIMATED GFR   Result Value Ref Range    GFR If African American >60 >60 mL/min/1.73 m 2    GFR If Non African American 52 (A) >60 mL/min/1.73 m 2   BLOOD CULTURE,HOLD   Result Value Ref Range    Blood Culture Hold Collected    All labs reviewed by me.        RADIOLOGY  DX-CHEST-PORTABLE (1 VIEW)   Final Result         1.  Hazy bilateral lobe opacities suggests atelectasis or infiltrates, similar to prior study.   2.  Trace left pleural effusion.      LE VENOUS DUPLEX (Specify in Comments Left, Right Or Bilateral)   Final Result      CT-HEAD W/O   Final Result         1.  No acute intracranial abnormality is identified, there are changes of small vessel ischemic disease with mild atrophy noted.   2.  Atherosclerosis.      The radiologist's interpretation of all radiological studies have been reviewed by me.    COURSE & MEDICAL DECISION MAKING  Nursing notes, VS, PMSFHx reviewed in chart.    6:32 PM Patient seen and examined at bedside.  Intravenous fluids administered for clinical indications of dehydration and tachycardia. Ordered LE Venous Duplex, CT Head, DX Chest, Lactic Acid every 2 hours, Ammonia, Prothrombin Time, APTT, Troponin, BNP, Urinalysis, CBC, CMP, Urine Culture, Blood  "Culture x2, EKG to evaluate her symptoms. The differential diagnoses include but are not limited to: PE, CHF, worsening liver cancer, hepatic encephalopathy, UTI, pneumonia, aspiration.    6:37 PM Eldest son at bedside. He states the patient was discharged from the hospital Thursday evening (7/12). She appeared fine Friday morning, then began to deteriorate Friday night, \"she was totally incoherent\". The bruise on her forehead resulted from falling in the hospital during her stay. She is not on any home O2. She has not been drinking enough fluids at home.     6:47 PM On chart review, patient was admitted to the hospital 6/23 for AMS and treated for UTI. She was on Levaquin and has a pulmonary nodule in the right middle lobe. Patient is allergic to iodine. She is DNR.    7:48 PM Ultrasound shows no DVT.    8:07 PM There was a positive fluid response. I discussed the patient's case and the above findings with Dr. Tong (Hospitalist) who agreed to admit patient.    8:15 PM White count elevated, will order for with Zosyn 4.5 g.      DISPOSITION:  Patient will be admitted to Dr. Tong, Hospitalist, in guarded condition.      FINAL IMPRESSION  1. Altered mental status, unspecified altered mental status type    2. Cholangiocarcinoma (HCC)    3. Edema of both legs    4. Leukocytosis, unspecified type          I, Mini Mtz (Campos), am scribing for, and in the presence of, Sandra Sorensen M.D..    Electronically signed by: Mini Mtz (Campos), 7/14/2018    ISandra M.D. personally performed the services described in this documentation, as scribed by Mini Mtz in my presence, and it is both accurate and complete.    The note accurately reflects work and decisions made by me.  Sandra Sorensen  7/14/2018  11:09 PM    "

## 2018-07-15 NOTE — CARE PLAN
Problem: Knowledge Deficit  Goal: Knowledge of disease process/condition, treatment plan, diagnostic tests, and medications will improve  All nursing care explained. Plan of care, medications, tests and procedures discussed.

## 2018-07-15 NOTE — ASSESSMENT & PLAN NOTE
Continue current sepsis care as per family but family do not want to escalate care. I confirmed this with daughter in law who is DPOA   Urine culture neg   On CTX/azithro for PNA

## 2018-07-15 NOTE — H&P
DATE OF ADMISSION:  07/14/2018    DATE OF SERVICE:  07/14/2018    PRIMARY CARE PHYSICIAN:  Tg Goodwin MD    CHIEF COMPLAINT:  Weakness and falls and confusion.    HISTORY OF PRESENT ILLNESS:  Patient is a chronically ill 63-year-old female   that has a history of stage IV liver cancer.  According to the previous   discharge and admission notes, it appears that she has metastatic   cholangiocarcinoma and she has been undergoing chemotherapy.  Today, she   presented to the hospital because of confusion and shortness of breath and   weakness.  Currently, I am unable to get much of history from the patient as   she remains confused and continues to fall asleep throughout the physical   exam.  She has apparently had deterioration in her mental status since Friday   and has been unable to ambulate.  Her son is her caretaker and he is not   available at this time.    REVIEW OF SYSTEMS:  Unable to obtain, so history has been obtained from the   chart.    CODE STATUS:  She is DNR/DNI.    ALLERGIES:  IODINE, SHELLFISH ALLERGY.    PAST MEDICAL HISTORY:   1.  Suspected metastatic stage IV cholangiocarcinoma, was treated with   chemotherapy.  2.  Peripheral neuropathy.  3.  Chronic back pain.    PAST SURGICAL HISTORY:  According to chart, eye surgery, appendectomy,   gastroscopy, colonoscopy, and GYN surgery.    SOCIAL HISTORY:  Negative for tobacco, drugs, or alcohol use.    FAMILY HISTORY:  Unable to obtain at this time.    HOME MEDICATIONS:  1.  Levaquin.  2.  Potassium.  3.  Lasix.  4.  Zofran.  5.  MS Contin.  6.  Wellbutrin.  7.  Acyclovir.  8.  Neurontin.  9.  Oxycodone.  10.  Phenergan.  11.  Colace.    PHYSICAL EXAMINATION:  VITAL SIGNS:  Blood pressure is 91/53, pulse 100, respirations 16, temperature   36.5, oxygen saturation 92% on 6 L nasal cannula.  Weight 68.5 kilograms.  GENERAL:  Patient appears chronically ill and is currently not in any acute   distress.  HEENT:  Dry mucous membranes.  No oropharyngeal  exudates.  She has a contusion   on her forehead.  Eyes, EOMI, PERRLA.  NECK:  No lymphadenopathy.  No JVD.  CARDIOVASCULAR:  Tachycardic, regular rhythm, no murmurs.  LUNGS:  She has decreased air entry at the bases, but no rales or rhonchi.  ABDOMEN:  She has hepatomegaly.  She is tender to palpation all over.  EXTREMITIES:  3+ bilateral lower extremity pitting edema.  NEUROLOGICAL:  She is lethargic.  She is disoriented to place, time, and   situation, oriented only to person.    LABORATORY DATA:  WBC 24.3, hemoglobin 11.1, hematocrit 34.4, .2,   platelets 446.  Sodium 135, potassium 5, BUN 12, creatinine 1.07.  Lactic acid   is 3.2.  Urinalysis is unremarkable.  Procalcitonin is 1.95.    IMAGING:  Chest x-ray shows hazy bilateral opacities suggesting atelectasis or   infiltrate.  CT head shows no acute intracranial abnormality.  Lower   extremity Doppler ultrasound is unremarkable.    ASSESSMENT AND PLAN:  Patient is a 63-year-old female that presents to the   hospital with confusion.  1.  Sepsis.  She fits sepsis criteria with a leukocytosis and tachycardia.    Her procalcitonin level is elevated as well and she has lactic acidosis.  At   this point, we will treat her accordingly with sepsis protocol with IV   antibiotics and IV fluids.  She is on IV Rocephin 2 g daily as well as   azithromycin.  We will follow up culture results and continue with IV fluids   for now.  2.  Acute encephalopathy.  I am not sure what her baseline mental status is,   but apparently she has had decline in her mental status over the past couple   of days, this may be related to her sepsis.  3.  Metastatic cholangiocarcinoma.  It appears that this is stage IV.  We will   need to discuss further with family, but she may be a candidate for hospice   care.  If family is agreeable to this, she is DNR/DNI.  4.  Chronic pain and narcotic dependence.  Continue MS Contin 30 mg b.i.d. and   p.r.n. pain medications.  5.  Acute  respiratory failure with hypoxia secondary to pneumonia.  We will   treat her with respiratory protocol and wean oxygen as tolerated.  6.  Macrocytic anemia, stable.  7.  Lactic acidosis secondary to sepsis.  We will treat her with fluids and   trend her lactic acid level.  8.  She is a DNR/DNI code status.  9.  I anticipated that the patient will need to be hospitalized for at least 2   midnights in order to treat the above-mentioned medical conditions.       ____________________________________     MD YVON Dixon / EMPERATRIZ    DD:  07/15/2018 01:02:48  DT:  07/15/2018 02:17:24    D#:  [id number]  Job#:  [id number]

## 2018-07-15 NOTE — ASSESSMENT & PLAN NOTE
Pending [facility] hosp[facility] assessment, family is in agreement with plan focusing in comfort care/hospice.   Pain management per pall care

## 2018-07-15 NOTE — ED NOTES
KINDER FALL RISK       RISK  Present to ED b/c of fall (syncope, seizure, or ALOC) yes  Age  >70 no  Altered Mental Status (Intoxicated with alcohol or substance confusion, inability to follow instructions, disorientation) yes  Impaired Mobility (Ambulates or transfers with assistive devices or assist. Ambulates with unsteady gait and no assistance.  Unable to ambulate to transfer.) yes  Nursing Judgement (Bowel or bladder incontinence, diarrhea, urinary frequency or urgency, leg weakness, orthostatic hypotension, dizziness or vertigo, narcotic use.) yes    YES TO ANY RISK = HIGH FALL RISK     Interventions in place marked in RED   1. Move patient closer to nurses stations  2. Familiarize the patient with environment  3. Place call light within reach and demonstrate call light use  4. Keep patients personal possessions within patient safe reach (if appropriate)   5. Place stretcher in low position and brakes locked  6.Place yellow socks and armband on patient   7. Place green triangle on patients door  8. Give patient urinal if applicable  9. Keep floor surfaces clean and dry  10.Keep patient care areas uncluttered  11. Use a lap belt or posey vest   12. Assess patient hourly for :Pain, persona needs, position change, and call light access.

## 2018-07-15 NOTE — ED NOTES
Bedside report received, assumed care of pt from Shea LAST. Pt on gurney resting with son; AOX*1, responds appropriately. Plan of care discussed - communication board updated.  Explained importance of calling before getting out of bed, pt verbalizes understanding.   Call light and belongings within reach; gurney in lowest position; will monitor frequently.

## 2018-07-15 NOTE — ED NOTES
First abx started.  Bolus complete.  Patient updated on plan of care.  Patient resting quietly in bed without distress, denies any complaints or needs at this time.  Call bell within reach.

## 2018-07-15 NOTE — ED NOTES
Med rec updated and complete.  Allergies reviewed.  Pt is unable to answer questions at this time.  Placed call to family to verify. Pt is currently taking  An antibiotic start date 07/13/18 for a 5 day course.

## 2018-07-15 NOTE — PROGRESS NOTES
2 RN skin check performed. Patient with some redness to bilateral heels, both blanching. No redness on sacrum, mepilex applied for prophylaxis.

## 2018-07-15 NOTE — PROGRESS NOTES
Unable to complete patient admission as patient not oriented at this time. Pt able to answer some questions but not a reliable historian. Will continue to monitor.

## 2018-07-15 NOTE — PROGRESS NOTES
Pt arrived to floor by stretcher. Pt A&O x2, vss, iv abx running. Pt on 6L O2 nasal cannula. No complaints at this time. Fall precautions, hourly rounding in place. Bed alarm activated.

## 2018-07-15 NOTE — CARE PLAN
Problem: Safety  Goal: Will remain free from injury  Fall precautions in place. Bed in low locked position, belongings and call light within reach. Patient encouraged to use call light for assistance. Hourly rounding in place. Bed alarm activated.

## 2018-07-15 NOTE — ED TRIAGE NOTES
Chief Complaint   Patient presents with   • ALOC     Disoriented per Son.   • Shortness of Breath     SpO2 76% on RA.  Pt does not wear home O2.  Pt placed on 4L NC.    Pt to triage in NAD.  Pt has stage IV liver cancer.  Sepsis score 3.  Pt to RED 12.

## 2018-07-16 NOTE — PROGRESS NOTES
Pt oriented to self only. Unable to complete admission profile d/t confusion. Pt very pleasant and calm, tolerated meds well. This RN spoke to her caregiver/daughter-in-law and pt baseline is A&Ox4, but gets confused with illnesses. Fall precautions in place, desk bed, hourly rounding in place.

## 2018-07-16 NOTE — DISCHARGE PLANNING
Received Choice form at 122  Agency/Facility Name: [facility]  Referral sent per Choice form @ 1223  Copy of choice form has been faxed to [facility].

## 2018-07-16 NOTE — PROGRESS NOTES
[facility]ist Progress Note    Date of Service: 2018    Chief Complaint  63 y.o. female admitted 2018 with altered mental status and pneumonia.    Interval Problem Update  Still altered, needing oxygen, wants to eat solid foods.     Consultants/Specialty  none    Disposition  Home with possible Home hospice after Hospice team see patient .         Review of Systems   Unable to perform ROS: Acuity of condition      Physical Exam  Laboratory/Imaging   Hemodynamics  Temp (24hrs), Av.6 °C (97.9 °F), Min:36.3 °C (97.4 °F), Max:37.1 °C (98.8 °F)   Temperature: 36.6 °C (97.8 °F)  Pulse  Av.2  Min: 94  Max: 121   Blood Pressure: 105/65      Respiratory      Respiration: 16, Pulse Oximetry: 91 %        RUL Breath Sounds: Clear, RML Breath Sounds: Clear, RLL Breath Sounds: Diminished, JACQUELYN Breath Sounds: Clear, LLL Breath Sounds: Diminished    Fluids    Intake/Output Summary (Last 24 hours) at 18 1408  Last data filed at 18 1100   Gross per 24 hour   Intake              318 ml   Output              500 ml   Net             -182 ml       Nutrition  Orders Placed This Encounter   Procedures   • Diet Order Clear Liquid     Standing Status:   Standing     Number of Occurrences:   1     Order Specific Question:   Diet:     Answer:   Clear Liquid [10]     Physical Exam   Constitutional:   Delirium ,disoriented x 3  Cachetic from cancer.    Eyes: Pupils are equal, round, and reactive to light.   Neck: Normal range of motion. Neck supple.   Cardiovascular: Normal heart sounds.    No murmur heard.  Tachycardia present   Pulmonary/Chest: Effort normal. She has no rales. She exhibits no tenderness.   Decreased air entry bilaterally.   Abdominal: Soft. Bowel sounds are normal. She exhibits no distension. There is no tenderness.   Palpable liver with hepatomegaly   Musculoskeletal: Normal range of motion.   Neurological:   Disoriented ×3       Recent Labs      18   1855   WBC  24.3*   RBC  3.27*    HEMOGLOBIN  11.1*   HEMATOCRIT  34.4*   MCV  105.2*   MCH  33.9*   MCHC  32.3*   RDW  89.0*   PLATELETCT  446   MPV  10.0     Recent Labs      07/14/18   1855   SODIUM  135   POTASSIUM  5.0   CHLORIDE  96   CO2  28   GLUCOSE  89   BUN  12   CREATININE  1.07   CALCIUM  9.6     Recent Labs      07/14/18 1855   APTT  31.2   INR  1.36*     Recent Labs      07/14/18   1855   BNPBTYPENAT  50              Assessment/Plan     * Sepsis (HCC)   Assessment & Plan    Continue current sepsis care as per family but family do not want to escalate care. I confirmed this with daughter in law who is DPOA         Cholangiocarcinoma metastatic to liver (HCC)- (present on admission)   Assessment & Plan    Pending [facility] assessment, family is in agreement with plan focusing in comfort care/hospice.         Acute respiratory failure with hypoxia (HCC)   Assessment & Plan    From pneumonia.  Continue current treatment.         Acute encephalopathy   Assessment & Plan    From Sepsis. At end of life from metastatic cancer.         DNR (do not resuscitate)- (present on admission)   Assessment & Plan    Confirmed with the family.  Continue current course of treatment, once hospice accepts her , discharge on home hospice.           Quality-Core Measures   Reviewed items::  Labs reviewed and Medications reviewed  Duarte catheter::  Critically Ill - Requiring Accurate Measurement of Urinary Output  DVT prophylaxis - mechanical:  SCDs  Antibiotics:  Treating active infection/contamination beyond 24 hours perioperative coverage

## 2018-07-16 NOTE — ASSESSMENT & PLAN NOTE
Confirmed with the family.  Continue current course of treatment, once hospice accepts her , discharge on home hospice.

## 2018-07-16 NOTE — DISCHARGE PLANNING
Anticipated Discharge Disposition: home with hospice    Action: Reviewed choice for hospice with SANDY Collins over the phone. She requests a referral be sent to [facility]. Faxed to Marly JAMES to [facility] to [facility].      Barriers to Discharge: wait for hospice to be set up    Plan: await acceptance by [facility].

## 2018-07-16 NOTE — CARE PLAN
Problem: Safety  Goal: Will remain free from injury  Outcome: PROGRESSING AS EXPECTED  All fall precautions in place, no evidence of falls or harm.

## 2018-07-16 NOTE — CONSULTS
"Reason for PC Consult: Advance Care Planning    Consulted by: Dr Quinn    Assessment:  General: 63 yr old female admitted on 7/14/18 for Sepsis.  Hx of Stage IV liver cancer-metastatic cholangiocarcinoma, recent DC on 7/12/18 with HH to attempt to continue with treatment. Pt had increasing SOB and ALOC and was admitted through the ED.     Dyspnea: No, currenlty denies, 96% on 3 liters NC.  Last BM: 07/15/18   Pain: Yes, pt states it is under control.   Depression: Unable to determine  Dementia: No (confused.      Spiritual:  Is Yazdanism or spirituality important for coping with this illness? No  Has a  or spiritual provider visit been requested? No    Palliative Performance Scale: 40%    Advance Directive: Advance Directive on File, choice of #2,3 and 5.   DPOA: Yes, DPOA Hanna Collins [phone], daughter-in-law.   POLST: Yes      Code Status: DNR/DNI    Outcome:  Met with the pt at the bedside. Introduced self and the role of Palliative Care. Asked if the remembered speaking with me on her last admit with a DC of 7/12/18. Pt is confused and did not remember.   Per notes the family had discussed hospice with the MD and Unit SW/Unit CM RN.     Spoke with the pt on her admit and how she was feeling. Pt states she has pain but declines further medication. Pt states \"this is what my pain looks. Its hurts, I just don't want to take any of those extra pills\". Pt tearful and stating \"I know its time...\". Spoke about Hospice and pain. Spoke about her children and home support. Provided support and empathy, let pt know that I would speak with her children.   Spoke with BS RN who stated that the pt will take her long acting scheduled pain medications but refuses to take any for breakthrough pain. BS RN has been attempting ot provide supplemental pain relief with hot packs and comforting touch.   Spoke with Dr Quinn who stated he would appreciate assistance from Palliative Team for pain control while awaiting " hospice eval and final POC.   Spoke with pts THAIS, Hanna, who stated that she and her  had requested to speak with [facility] but she would also appreciate to meet. Hanna will be at the bedside later today, agreed to meet at the bedside.     Updated: MD, Unit CM RN    Plan: Pt will be evaluated by [facility].     Recommendations: I recommend a hospice consult.    Thank you for allowing Palliative Care to participate in this patient's care. Please feel free to call [phone] with any questions or concerns.

## 2018-07-16 NOTE — CARE PLAN
Problem: Safety  Goal: Will remain free from injury  Fall precautions in place. Bed in low locked position, belongings and call light within reach. Patient using call light for assistance. Hourly rounding in place. Bed alarm activated.

## 2018-07-16 NOTE — PROGRESS NOTES
Assumed care of pt at 1900. Pt A&O x2, pleasant. vss. Pt weaned to 3L O2. Tolerating diet and medications. Pt turning self in bed with assistance at times. mepilex to coccyx for prophylaxis, feet elevated on pillows. Pt ambulated to bathroom with walker and handheld assist. Fall precautions and hourly rounding in place. Pt in desk bed and bed alarm activated.

## 2018-07-16 NOTE — PROGRESS NOTES
[facility]ist Progress Note    Date of Service: 7/15/2018    Chief Complaint  63 y.o. female admitted 2018 with altered mental status and pneumonia.    Interval Problem Update  Patient continued to have worsening encephalopathy.  Patient appears very weak.    Consultants/Specialty  none    Disposition  Home with possible Home hospice after Hospice team see patient .         Review of Systems   Unable to perform ROS: Acuity of condition      Physical Exam  Laboratory/Imaging   Hemodynamics  Temp (24hrs), Av.8 °C (98.2 °F), Min:36.5 °C (97.7 °F), Max:37.1 °C (98.8 °F)   Temperature: 37.1 °C (98.8 °F)  Pulse  Av.8  Min: 96  Max: 121 Heart Rate (Monitored): (!) 113  Blood Pressure: 120/67, NIBP: (!) 91/60      Respiratory      Respiration: 16, Pulse Oximetry: 91 %        RUL Breath Sounds: Clear, RML Breath Sounds: Clear, RLL Breath Sounds: Diminished, JACQUELYN Breath Sounds: Clear, LLL Breath Sounds: Diminished    Fluids    Intake/Output Summary (Last 24 hours) at 07/15/18 1724  Last data filed at 07/15/18 1646   Gross per 24 hour   Intake              320 ml   Output              650 ml   Net             -330 ml       Nutrition  Orders Placed This Encounter   Procedures   • Diet Order Clear Liquid     Standing Status:   Standing     Number of Occurrences:   1     Order Specific Question:   Diet:     Answer:   Clear Liquid [10]     Physical Exam   Constitutional:   Cachectic and malnourished secondary to metastatic cancer.  Appears acutely ill.  Patient has delirium from the acute illness   Eyes: Pupils are equal, round, and reactive to light.   Neck: Normal range of motion. Neck supple.   Cardiovascular: Normal heart sounds.    No murmur heard.  Tachycardia present   Pulmonary/Chest: Effort normal. She has no rales. She exhibits no tenderness.   Decreased air entry bilaterally.   Abdominal: Soft. Bowel sounds are normal. She exhibits no distension. There is no tenderness.   Palpable liver with  hepatomegaly   Musculoskeletal: Normal range of motion.   Neurological:   Disoriented ×3       Recent Labs      07/14/18   1855   WBC  24.3*   RBC  3.27*   HEMOGLOBIN  11.1*   HEMATOCRIT  34.4*   MCV  105.2*   MCH  33.9*   MCHC  32.3*   RDW  89.0*   PLATELETCT  446   MPV  10.0     Recent Labs      07/14/18   1855   SODIUM  135   POTASSIUM  5.0   CHLORIDE  96   CO2  28   GLUCOSE  89   BUN  12   CREATININE  1.07   CALCIUM  9.6     Recent Labs      07/14/18 1855   APTT  31.2   INR  1.36*     Recent Labs      07/14/18 1855   BNPBTYPENAT  50              Assessment/Plan     * Sepsis (HCC)   Assessment & Plan    Source of infection is pulmonary from pneumonia.  Patient has a healthcare associated pneumonia.  At this time family wants to focus on comfort related goals.  Will not change antibiotic for now and will discuss about hospice arrangement for home tomorrow.        Cholangiocarcinoma metastatic to liver (HCC)- (present on admission)   Assessment & Plan    Spoke with Daughter in law.  I discussed about hospice.  She has in and out of hospitalization and patient continued to feel more sicker and sicker.  Considering advanced age for liver disease patient's daughter-in-law agrees with the hospice evaluation.  Hospice consult is placed in the chart.  Palliative care team to follow.  CODE STATUS is confirmed as DNI-DNR.        Acute respiratory failure with hypoxia (HCC)   Assessment & Plan    From pneumonia.  Continue current treatment.        Acute encephalopathy   Assessment & Plan    Patient continued to be encephalopathy.  This was updated to family.        DNR (do not resuscitate)- (present on admission)   Assessment & Plan    Confirmed with the family.  Continue current course of treatment          Quality-Core Measures   Reviewed items::  Labs reviewed and Medications reviewed  Duarte catheter::  Critically Ill - Requiring Accurate Measurement of Urinary Output  DVT prophylaxis - mechanical:   SCDs  Antibiotics:  Treating active infection/contamination beyond 24 hours perioperative coverage

## 2018-07-16 NOTE — PROGRESS NOTES
Patient alert and confused. Patient oriented to self and place. Patient having episodes of hallucinations. Patient being turned q2 hours in bed. Mepilex to sacrum for preventive purposes. No evidence of skin breakdown. Patient complaining of abdominal pain-taking long acting pain medication, but refusing short acting. Patient educated on using other medication for pain management-patient still refuses. Other pain management methods implemented-patient repositioned, heating packs applied. Palliative care in to see patient. All belongings and call light within reach. Monitoring to continue.

## 2018-07-16 NOTE — CONSULTS
"Palliative Care Note  Received request by palliative care nurse Mily to see the patient for pain management per Dr. Frazier.  She is a 63-year-old female admitted 7/14/2018 with altered mental status and pneumonia.  Reviewed chart.  She is being treated for sepsis but family does not want to escalate care. He has a past medical history of cholangiocarcinoma metastatic to liver.  The patient is somewhat confused per Mily. She is \"clearly uncomfortable\" and having signs of pain such as restlessness and states that her pain is uncontrolled. Per Mily and bedside nurse, the patient will not take short acting pian medication for break through pain. The patient can swallow pills. She has a DNR code status and has both a POLST and Advance Directive on file. A referral was made to [facility] this afternoon this afternoon.     Vital signs: T 97.9, NM 72, RR 20, Pulse oxymetry 92% on 3 LPM via nasal cannula, /50 (stable for this admission).    Pertinent labs:  Bun 12  Crt 1.07    Current pain regimen:  MS Contin 30 mg PO BID  Gabapentin 300 mg PO TID  Oxycodone 10 mg BID daily PRN (patient has not taken)    Updated pain regimen:  Increase MS Contin to 45 mg PO BID  Continue with gabapentin 300 mg PO TID  Discontinue oxycodone 10 mg BID daily PRN  Start MS IR 7.5 mg PO Q 3 hours PRN moderate to severe pain (approximately 10% of daily opioid dose)    I will see the patient tomorrow for a full consult.    Thank you for allowing Palliative Care to participate in this patient's care. Please call our team with questions and/or additional needs.    Total visit time was 20 minutes discussing pain management with palliative care RN and reviewing patient's chart.      "

## 2018-07-16 NOTE — RESPIRATORY CARE
COPD EDUCATION by COPD CLINICAL EDUCATOR  7/16/2018 at 6:49 AM by Angelique Pulido     Patient interviewed by COPD education team. Patient refused COPD program at this time.

## 2018-07-17 NOTE — DISCHARGE PLANNING
Anticipated Discharge Disposition: home with hospice     Action: Received a call from Kristina at hospice, plan is for pt to dc home with hospice, anticipated dc Thursday 7/19/18.       Barriers to Discharge: wait for hospice to be set up     Plan: Home with [facility].

## 2018-07-17 NOTE — CARE PLAN
Problem: Safety  Goal: Will remain free from injury  Outcome: PROGRESSING AS EXPECTED  All safety precautions in place, bed locked in lowest position. Bed alarm on. No evidence of falls or harm.

## 2018-07-17 NOTE — CARE PLAN
Problem: Skin Integrity  Goal: Risk for impaired skin integrity will decrease  Outcome: PROGRESSING AS EXPECTED  Patient turns self in bed with encouragement from staff. Waffle mattress in place. No evidence of skin breakdown.

## 2018-07-17 NOTE — PALLIATIVE CARE
"Palliative Care follow-up  Met with pt and family for support and follow up. Pt and family had just met with [facility] and feeling very appreciative and eager for DC to home later this week. PT feeling better, mentation improved and pain under better control. Spoke with pt and family on plan, provided support. Pt stated that she felt very relieved \"to have things taken care of\". Let pt and family know to call for any needs.    Updated:  Unit RNCM and Unit SW    Plan: Pt to DC home with [facility] later this week, possibly Thursday.     Thank you for allowing Palliative Care to participate in this patient's care. Please feel free to call [phone] with any questions or concerns.  "

## 2018-07-17 NOTE — CONSULTS
DATE OF SERVICE:  07/17/2018    PALLIATIVE CARE CONSULTATION REPORT    REQUESTING PHYSICIAN:  Ian Quinn MD    CONSULTING PHYSICIAN:  Bryanna Deleon MD    REASON FOR CONSULTATION:  Pain, delirium.    HISTORY OF PRESENT ILLNESS:  The patient is a 63-year-old female who was   admitted to [facility] on 07/14 with increased confusion,   shortness of breath and increased abdominal pain.  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    The patient has extensive metastasis to the liver.  The patient has received 2   cycles of chemotherapy and she has not done well with either cycle.  This is   her second admission since starting chemotherapy.  The patient currently   remains somewhat confused.  She is able to answer some questions reasonably   well, but is even unclear with regard to the number of children that she has,   and although she will answer symptom management questions fairly well, she   will often ramble off onto different topics.  She states that she has pain in   her abdomen that feels like a tight belt sensation around her upper abdomen.    She says at its worse it is 9/10, currently she reports this 7.5-8/10.  She   wishes that she could state her goal was 0, but she realizes that is   unrealistic.  She does have shortness of breath.  She reports to me that she   does recognize that she is somewhat confused.  Her appetite is poor.    REVIEW OF SYSTEMS:  She has no chest pain.  She does have shortness of breath.    She has abdominal pain as described above.  Last bowel movement was   yesterday, but she has had problems with constipation.  No significant cough.    No dysuria.  Significant weakness.  Rest of the review of systems is   unremarkable.    PAST MEDICAL HISTORY:  Remarkable for cholangiocarcinoma metastatic to the   liver.  She has received 2 cycles of chemotherapy.  Apparently, she had   uterine cancer at the  age of 23, peripheral neuropathy and chronic back pain.    PAST SURGICAL HISTORY:  She had a TAHBSO, appendectomy, colonoscopy and EGD,   both were negative.    SOCIAL HISTORY:  I believe that she is .  She says that she has 4   children, but the records state that she has 2 children.  She has a history of   prior tobacco use, moderate alcohol use.    FAMILY HISTORY:  Unremarkable to current situation.    ALLERGIES:  No known medical allergies.    MEDICATIONS:  Acyclovir 200 mg p.o. b.i.d., Zithromax 250 mg IV daily,   Rocephin 2 g IV q. 24 hours, Wellbutrin 75 mg b.i.d., gabapentin 300 mg   t.i.d., MS Contin 45 mg p.o. b.i.d. just increased from 30 mg twice a day   yesterday, senna 2 p.o. b.i.d., morphine immediate release 7.5 mg p.o. q. 3   hours p.r.n., and she is usually not asking for that.  Of note, is that she   was on MS Contin 60 mg b.i.d. at home.    SPIRITUAL HISTORY:  Not obtained.    ADVANCE CARE PLANNING AND GOALS OF CARE:  During her May admission, she did   complete a POLST and she checked a do not resuscitate and selected medical   interventions.  In addition, she completed an advanced directive and her   daughter-in-law, Hanna Collins, is her power of  for healthcare.  I   did ask the patient if she intended to continue with chemotherapy.  At that   point, she became very tearful and she said that she has been told that it is   not helping her, but she really wants to keep going with it.  I did discuss   with her that if the disease was progressing despite the chemotherapy, what   would be the things that would be most important for her to do with the time   she has left and she was able to say spend time with her family.  When I asked   her what she was most concerned about, she related that she was most   concerned about her grandchildren, but at that point, she started to have   difficulty telling me actually how old they were and how many she had.    LABORATORY DATA:  On  admission, white blood cell count 24.3, H and H is 11.1   and 34.4.  Bilirubin 2.2.  Alkaline phosphatase 410.  Albumin 3.7.  GFR 52 mL   per minute.    PHYSICAL EXAMINATION:  VITAL SIGNS:  Blood pressure currently is 95/60 with a heart rate of 104,   temperature 99.4, pulse ox on 3 liters is 90%, BMI is 22.3, respiratory rate   is 20.  GENERAL:  The patient is awake and alert, but she is oriented to person and   place, but not to time and variably oriented to situation.  She frequently   will go off on tangents while she is talking that are unrelated to our current   dialogue.  HEENT:  Pupils vary during the conversation from 2.5 mm to 3.5 mm.    Extraocular muscles are intact.  Conjunctivae are neither pale nor icteric.    Mouth, mucous membranes are quite dry.  She has bitemporal wasting.  CARDIOVASCULAR:  Slightly tachycardic, S1, S2, no murmur.  LUNGS:  Diminished breath sounds at both lung fields bilaterally with crackles   and expiratory wheezes noted.  ABDOMEN:  Soft, significant tenderness the entire right side, increased   interstitial edema noted throughout the abdomen.  EXTREMITIES:  Significant bilateral lower extremity edema to the thigh, 2-3+.    DIAGNOSTIC DATA:  Ultrasound done of the abdomen on 07/02 reveals a   heterogeneously enlarged liver, suggestive of diffuse metastatic involvement.    There is a 2.3 cm mass in the right lobe of the liver.  No ascites noted.    Total size of her liver is 26.24 cm.    PROBLEM LIST:  1.  Significant cancer-related abdominal pain.  2.  Cholangiocarcinoma with diffuse metastasis to the liver, poorly   differentiated adenocarcinoma.  3.  Significant delirium, waxing and waning.  4.  Diffuse bilateral pneumonia with hypoxic respiratory failure.  5.  Mild renal insufficiency.  6.  Leukocytosis.  7.  Mild hyperbilirubinemia.  8.  Code status is do not resuscitate/do not intubate.  POLST and advanced   directive complete.    DISCUSSION:  For the patient's pain at this  point, I will go ahead and   increase her MS Contin to 60 mg p.o. q. 12 hours.  At this point, the   conversation will being had with the patient's daughter-in-law, Hanna, and a   hospice RN to determine if hospice would be the best course of action for the   patient.  In the meantime, she should certainly continue to have her pneumonia   aggressively treated as well as her pain.  Depending on how she responds to   the increased dose of morphine, we may increase her gabapentin.  In the   meantime, I will also check a CBC and a comprehensive metabolic panel.    Thank you for this palliative care referral.  We will continue to follow both   with the primary treatment team and with the hospice team as appropriate.  The   patient is not appropriate for GIP at this moment as she is aggressively   treating her pneumonia.       ____________________________________     MD ZENAIDA Yañez / EMPERATRIZ    DD:  07/17/2018 13:12:31  DT:  07/17/2018 14:22:43    D#:  [id number]  Job#:  [id number]

## 2018-07-17 NOTE — HOSPICE
Spoke with patient's QUIANA Hurst who requested hospice discussion this afternoon at 2pm so her  Vincenzo and brother Eamon could be present for discussion. Appointment scheduled.

## 2018-07-17 NOTE — PALLIATIVE CARE
Palliative Care follow-up  Met with pt and daughter-in-law Hanna. Provided update the MD had requested assistance to manage the pts pain and that changes had been made. Hanna and pt grateful and looking forward to speaking with Hospice. Answered questions on hospice as able for Hanna and let her know that many of her questions will be appropriate to ask during their consult, such as amount of time for assistance ext. Did discuss that she might hire caregiving assistance as the family would prefer to have her come home on hospice.     Updated: Unit CM RN    Plan: Pt to be evaluated by [facility]. PC RN will continue to work with pt and family for POC.     Thank you for allowing Palliative Care to participate in this patient's care. Please feel free to call [phone] with any questions or concerns.

## 2018-07-17 NOTE — PROGRESS NOTES
Received report from day shift RN, assumed care. Patient is awake, on bed/A&OX2. Up X 1 assist, denies pain, due medications given. PIV flushing well. Pt on 3L O2 via NC, tolerating well. Assessment completed c/o significant findings. Patient repositioned, extra pillows placed for support, made comfortable. Discussed plan of care. Bed alarm in use, call light and personal belongings within reach, bed kept low, treaded socks on. Assisted as necessary. Kept rested and comfortable at all times. Hourly rounds in place.

## 2018-07-17 NOTE — PROGRESS NOTES
Patient alert and confused. Patient having visual hallucinations at times. Patient assessed and all needs met. Denies pain at present time. Complains of nausea, but states that it is improving with medications given. Mepilex on sacrum for prophylaxis. Waffle cushion on bed. Patient turns self in bed with encouragement from staff. No evidence of skin breakdown. Edema to bilateral extremities-placed on pillows. All safety precautions in place. Bed alarm on, bed locked in lowest position. All belongings and call light within reach. Monitoring to continue.

## 2018-07-17 NOTE — CARE PLAN
Problem: Safety  Goal: Will remain free from injury  Safety precautions and fall prevention in place. Bed at lowest position with build in and strip alarm on. Room close to nursing station. Hourly rounds in place. Patient encouraged to use call light for assistance.     Problem: Infection  Goal: Will remain free from infection  Standard precautions in place. Abx tx per MAR. Pt afebrile.

## 2018-07-18 NOTE — HOSPICE
Spoke with Hanna, pt's daughter in law, Hanna preparing house to receive DME, hospice scheduled to deliver DME tomorrow between 1 and 3 pm, [facility] able to admit the patient tomorrow 7/19/18 if patient is medically stable for discharge.

## 2018-07-18 NOTE — CARE PLAN
Problem: Skin Integrity  Goal: Risk for impaired skin integrity will decrease  Outcome: PROGRESSING AS EXPECTED  Patient being turned q2 hours, mepilex on, waffle mattress in place. Heels elevated on pillows. No evidence of skin breakdown.

## 2018-07-18 NOTE — PROGRESS NOTES
[facility] Hospitalist Progress Note    Date of Service: 2018    Chief Complaint  63 y.o. female admitted 2018 with stage IV cholangiocarcinoma on chemotherapy who presented with AMS and sepsis.    Interval Problem Update  : Continued to complains of pain. Not eating well, falls asleep at meal tray    : Has abd pain. No recent BM, will schedule miralax.    Consultants/Specialty  Pall care  Hospice    Disposition  Hospice accepted, home Thursday        Review of Systems   Unable to perform ROS: Mental acuity   Constitutional: Positive for malaise/fatigue.   Gastrointestinal: Positive for abdominal pain.   Musculoskeletal: Positive for back pain.   Neurological: Positive for weakness.      Physical Exam  Laboratory/Imaging   Hemodynamics  Temp (24hrs), Av.8 °C (98.2 °F), Min:36.4 °C (97.6 °F), Max:37.2 °C (98.9 °F)   Temperature: 37.2 °C (98.9 °F)  Pulse  Av.9  Min: 72  Max: 121   Blood Pressure: 102/64      Respiratory      Respiration: 16, Pulse Oximetry: 90 %     Work Of Breathing / Effort: Mild  RUL Breath Sounds: Clear, RML Breath Sounds: Diminished, RLL Breath Sounds: Diminished, JACQUELYN Breath Sounds: Clear, LLL Breath Sounds: Diminished    Fluids    Intake/Output Summary (Last 24 hours) at 18 1140  Last data filed at 18 1000   Gross per 24 hour   Intake              440 ml   Output              400 ml   Net               40 ml       Nutrition  Orders Placed This Encounter   Procedures   • Diet Order Low Fiber(GI Soft)     Standing Status:   Standing     Number of Occurrences:   1     Order Specific Question:   Diet:     Answer:   Low Fiber(GI Soft) [2]     Physical Exam   Constitutional:   Thin, frail   HENT:   Head: Normocephalic and atraumatic.   Eyes: Conjunctivae are normal. No scleral icterus.   Cardiovascular: Normal rate and regular rhythm.    Pulmonary/Chest: Effort normal. No respiratory distress. She has no wheezes.   Abdominal: Soft. There is tenderness. There is no  rebound and no guarding.   Musculoskeletal:   Diffuse muscle atrophy   Neurological: She is alert.   Oriented to self, disoriented to place and date   Skin: Skin is warm and dry.   Nursing note and vitals reviewed.                               Assessment/Plan     * Sepsis (HCC)   Assessment & Plan    Continue current sepsis care as per family but family do not want to escalate care. I confirmed this with daughter in law who is DPOA   Urine culture neg   On CTX/azithro for PNA        Cholangiocarcinoma metastatic to liver (HCC)- (present on admission)   Assessment & Plan    Pending [facility] assessment, family is in agreement with plan focusing in comfort care/hospice.   Pain management per pall care        Acute respiratory failure with hypoxia (HCC)   Assessment & Plan    From pneumonia.  Continue CTX/azithro        Acute encephalopathy   Assessment & Plan    From Sepsis. At end of life from metastatic cancer.         DNR (do not resuscitate)- (present on admission)   Assessment & Plan    Confirmed with the family.  Continue current course of treatment, once hospice accepts her , discharge on home hospice.           Quality-Core Measures   Reviewed items::  EKG reviewed, Labs reviewed, Medications reviewed and Radiology images reviewed  Duarte catheter::  Hospice / Comfort Care  DVT prophylaxis - mechanical:  SCDs

## 2018-07-18 NOTE — CARE PLAN
Problem: Safety  Goal: Will remain free from injury  Outcome: PROGRESSING AS EXPECTED  All safety precautions in place. Bed locked in lowest position, strip alarm on. Call light within reach. No evidence of fall or harm.

## 2018-07-18 NOTE — PROGRESS NOTES
Patient alert and disoriented. Patient assessed and all needs met. Patient on 3L O2-none baseline. On CPOE for monitoring. Patient resting comfortably in bed. Patient being turned q2 hours. Mepilex on, waffle mattress on. Heels elevated on pillows. Duarte intact-tea colored urine. Patient denies pain at present time. All safety precautions in place. Strip alarm on, bed locked in lowest position. All belongings and call light within reach. Monitoring to continue.

## 2018-07-19 PROBLEM — A41.9 SEPSIS (HCC): Status: RESOLVED | Noted: 2018-01-01 | Resolved: 2018-01-01

## 2018-07-19 PROBLEM — G93.40 ACUTE ENCEPHALOPATHY: Status: RESOLVED | Noted: 2018-01-01 | Resolved: 2018-01-01

## 2018-07-19 NOTE — PROGRESS NOTES
Dr. Broderick notified of kebede not draining, bladder scan ordered-shown 526ml.  Notified-new orders received. Kebede removed at 1825-voiding trial to be started according to orders. Monitoring to continue.

## 2018-07-19 NOTE — DISCHARGE SUMMARY
Discharge Summary    CHIEF COMPLAINT ON ADMISSION  Chief Complaint   Patient presents with   • ALOC     Disoriented per Son.   • Shortness of Breath     SpO2 76% on RA.  Pt does not wear home O2.  Pt placed on 4L NC.        Reason for Admission  Wheezing     Admission Date  7/14/2018    CODE STATUS  DNAR/DNI    HPI & HOSPITAL COURSE  This is a 63 y.o. female here with stage IV cholangiocarcinoma who presented with AMS and sepsis. She was treated with 5 days of CTX and azithromycin for pneumonia. Her AMS improved. She has severe pain related to her cancer and palliative care was consulted. Her MS contin was increased with improved control, along with as needed morphine IR. Patient and her DPOA Hanna requested hospice referral given her severe cancer pain. She was accepted to [facility] and discharged home under their further management.    In prescribing controlled substances to this patient, I certify that I have obtained and reviewed the medical history of Tonya Collins. I have also made a good nayely effort to obtain applicable records from other providers who have treated the patient and records did not demonstrate any increased risk of substance abuse that would prevent me from prescribing controlled substances.     I have conducted a physical exam and documented it. I have reviewed Ms. Collins’s prescription history as maintained by the [place] Prescription Monitoring Program.     I have assessed the patient’s risk for abuse, dependency, and addiction using the validated Opioid Risk Tool available at https://www.mdcalc.com/bnoyaw-mzqq-fqkv-ort-narcotic-abuse.     Given the above, I believe the benefits of controlled substance therapy outweigh the risks. The reasons for prescribing controlled substances include non-narcotic, oral analgesic alternatives have been inadequate for pain control. Accordingly, I have discussed the risk and benefits, treatment plan, and alternative therapies with the  patient.          Therefore, she is discharged in guarded and stable condition to hospice.    The patient met 2-midnight criteria for an inpatient stay at the time of discharge.    Discharge Date  7/19/18    FOLLOW UP ITEMS POST DISCHARGE  Hospice for pain control    DISCHARGE DIAGNOSES  Principal Problem (Resolved):    Sepsis (HCC) POA: Unknown  Active Problems:    Cholangiocarcinoma metastatic to liver (HCC) POA: Yes    DNR (do not resuscitate) POA: Yes    Acute respiratory failure with hypoxia (HCC) POA: Yes  Resolved Problems:    Acute encephalopathy POA: Unknown      FOLLOW UP  No future appointments.  [address]  [place] [address]  [phone]          MEDICATIONS ON DISCHARGE     Medication List      CHANGE how you take these medications      Instructions   * morphine ER 60 MG Tbcr tablet  What changed:  · medication strength  · how much to take  Commonly known as:  MS CONTIN   Take 1 Tab by mouth every 12 hours for 7 days.  Dose:  60 mg     * morphine 15 MG tablet  What changed:  You were already taking a medication with the same name, and this prescription was added. Make sure you understand how and when to take each.  Commonly known as:  MS IR   Take 0.5 Tabs by mouth every 6 hours as needed for Severe Pain for up to 7 days.  Dose:  7.5 mg        * This list has 2 medication(s) that are the same as other medications prescribed for you. Read the directions carefully, and ask your doctor or other care provider to review them with you.            CONTINUE taking these medications      Instructions   gabapentin 300 MG Caps  Commonly known as:  NEURONTIN   Take 300 mg by mouth 3 times a day.  Dose:  300 mg     ondansetron 4 MG Tbdp  Commonly known as:  ZOFRAN ODT   Take 1 Tab by mouth every 6 hours as needed for Nausea.  Dose:  4 mg     promethazine 25 MG Tabs  Commonly known as:  PHENERGAN   Take 25 mg by mouth every 6 hours as needed for Nausea/Vomiting.  Dose:  25 mg        STOP  taking these medications    furosemide 40 MG Tabs  Commonly known as:  LASIX     levoFLOXacin 750 MG tablet  Commonly known as:  LEVAQUIN     oxyCODONE immediate release 10 MG immediate release tablet  Commonly known as:  ROXICODONE     potassium chloride SA 20 MEQ Tbcr  Commonly known as:  Kdur            Allergies  Allergies   Allergen Reactions   • Iodine Anaphylaxis   • Shellfish Allergy Anaphylaxis       DIET  Orders Placed This Encounter   Procedures   • Diet Order Low Fiber(GI Soft)     Standing Status:   Standing     Number of Occurrences:   1     Order Specific Question:   Diet:     Answer:   Low Fiber(GI Soft) [2]       ACTIVITY  As tolerated.  Weight bearing as tolerated    CONSULTATIONS  [facility] care  Hospice    PROCEDURES  None    LABORATORY  Lab Results   Component Value Date    SODIUM 135 07/14/2018    POTASSIUM 5.0 07/14/2018    CHLORIDE 96 07/14/2018    CO2 28 07/14/2018    GLUCOSE 89 07/14/2018    BUN 12 07/14/2018    CREATININE 1.07 07/14/2018        Lab Results   Component Value Date    WBC 24.3 (H) 07/14/2018    HEMOGLOBIN 11.1 (L) 07/14/2018    HEMATOCRIT 34.4 (L) 07/14/2018    PLATELETCT 446 07/14/2018        Total time of the discharge process exceeds 40 minutes.

## 2018-07-19 NOTE — PROGRESS NOTES
Pt is A&Ox2. Currently resting in bed and no complaints of pain during this time. On 4 L nasal cannula and vs within normal limits. Pt is up with 1-2 to bedside commode. Bed alarm is activated and call light within reach. Will continue to monitor pt.

## 2018-07-20 NOTE — PROGRESS NOTES
Discharge paper work completed for patient. Daughter in law will  pt soon. PIV was removed and pt changed into different clothing. Awaiting discharge.

## 2018-07-20 NOTE — DISCHARGE INSTRUCTIONS
Discharge Instructions    Discharged to home by car with relative. Discharged via wheelchair, hospital escort: Yes.  Special equipment needed: Oxygen    Be sure to schedule a follow-up appointment with your primary care doctor or any specialists as instructed.     Discharge Plan:   Diet Plan: Discussed  Activity Level: Discussed  Confirmed Follow up Appointment: Appointment Scheduled  Confirmed Symptoms Management: Discussed  Medication Reconciliation Updated: Yes  Influenza Vaccine Indication: Patient Refuses    I understand that a diet low in cholesterol, fat, and sodium is recommended for good health. Unless I have been given specific instructions below for another diet, I accept this instruction as my diet prescription.   Other diet: GI soft diet    Special Instructions: None    · Is patient discharged on Warfarin / Coumadin?   No     Depression / Suicide Risk    As you are discharged from this [facility] facility, it is important to learn how to keep safe from harming yourself.    Recognize the warning signs:  · Abrupt changes in personality, positive or negative- including increase in energy   · Giving away possessions  · Change in eating patterns- significant weight changes-  positive or negative  · Change in sleeping patterns- unable to sleep or sleeping all the time   · Unwillingness or inability to communicate  · Depression  · Unusual sadness, discouragement and loneliness  · Talk of wanting to die  · Neglect of personal appearance   · Rebelliousness- reckless behavior  · Withdrawal from people/activities they love  · Confusion- inability to concentrate     If you or a loved one observes any of these behaviors or has concerns about self-harm, here's what you can do:  · Talk about it- your feelings and reasons for harming yourself  · Remove any means that you might use to hurt yourself (examples: pills, rope, extension cords, firearm)  · Get professional help from the community (Mental Health, Substance  Abuse, psychological counseling)  · Do not be alone:Call your Safe Contact- someone whom you trust who will be there for you.  · Call your local CRISIS HOTLINE 403-1137 or 372-913-1823  · Call your local Children's Mobile Crisis Response Team [place] (538) 933-7292 or www.Bunkr  · Call the toll free National Suicide Prevention Hotlines   · National Suicide Prevention Lifeline 705-307-EDGH (1414)  · National Hope Line Network 800-SUICIDE (955-5609)

## 2018-07-24 NOTE — DOCUMENTATION QUERY
"DOCUMENTATION QUERY    PROVIDERS: Please select “Cosign w/ note”to reply to query.    To better represent the severity of illness of your patient, please review the following information and exercise your independent professional judgment in responding to this query.   Patient presented with altered mental status and sepsis secondary to hospital acquired pneumonia and was found to have acute encephalopathy and acute hypoxic respiratory failure.    Per ICD-10 coding guidelines, severe sepsis can be coded when there is documented evidence of a major organ dysfunction/failure/or shock documented as due to sepsis.     Based on clinical findings, risk factors and treatment, can \"sepsis\" be further clarified as     1. Associated with acute encephalopathy and/or acute hypoxic respiratory failure (severe sepsis)  2. Not associated with acute encephalopathy and/or acute hypoxic respiratory failure (sepsis)  3. Other explanation of clinical presentation (please document)  4. Unable to determine    The medical record reflects the following:   Clinical Findings  sepsis   HCAP   acute hypoxic respiratory failure   acute encephalopathy   Treatment  IV antibiotics   Risk Factors     Location within medical record  History and Physical, Progress Notes, Discharge Summary and ED Report     Thank you,   Elise Boss          "

## 2018-08-01 LAB
FUNGUS SPEC CULT: NORMAL
SIGNIFICANT IND 70042: NORMAL
SITE SITE: NORMAL
SOURCE SOURCE: NORMAL

## 2020-01-01 NOTE — PROGRESS NOTES
-4% down from 6% down.  Gained almost 2oz in 4 days.  Will check on NBS which was drawn on Friday since first was abnormal   [facility] Hospitalist Progress Note    Date of Service: 5/10/2018    Chief Complaint  63 y.o. female admitted 5/4/2018 with uncontrolled RUQ pain, adenocarcinoma on biopsy of liver but there is confusion to primary source.    Interval Problem Update  5/8 Patient's pain is under good control with current regimen but she is having hypoxia while sleeping and poor mentation on waking - likely over sedated with current narcotic regimen.  I've reduced the dose of dilaudid and keeping long acting MS contin dose the same.  5/9 Patient stopped taking dilaudid yesterday with some improvement in mentation but today started having fever, headache and malaise.  UA shows trace LE and CXR concerning for infiltrate left lower lobe and increasing WBC.  Empiric rocephin/azitromycin started to cover both lung and urine possibilities.  Dr Aleman evaluated patient this am and has a pathology report at the office stating possible cholangiocarcinoma - the first of the three results on tissue taken 4/19 - clinically would make more sense for this presentation.  Once patient is stable for discharge, a PET scan and PORT placement will need coordination.  Patient worked with PT today and Four Wheeled Walker recommended for discharge - order placed.  5/10 Patient feeling better today, states she is not confused nor disoriented like before with the higher dose pain medication.  She is having slightly more pain in her left hip over the trochanteric bursa and no erythema seen nor warmth palpated.  Recommended ice to the area for pain control.  She remains afebrile since yesterday am.  She is still requiring 2 liters of oxygen and hoping she does not need this for discharge home - will continue with IS and antibiotics to see if she can wean from this.    Consultants/Specialty  D/w Dr Fernandes  D/w Dr Aleman    Disposition  Home soon  Four wheeled walker          Review of Systems   Constitutional: Positive for fever and malaise/fatigue. Negative for chills.    HENT: Negative for congestion and sore throat.    Eyes: Negative for blurred vision and photophobia.   Respiratory: Negative for cough, sputum production and shortness of breath.    Cardiovascular: Negative for chest pain, claudication and leg swelling.   Gastrointestinal: Positive for abdominal pain (improved). Negative for constipation, diarrhea, heartburn, nausea and vomiting.   Genitourinary: Negative for dysuria and hematuria.   Musculoskeletal: Positive for myalgias (hip pain). Negative for joint pain.   Skin: Negative for itching and rash.   Neurological: Negative for dizziness, sensory change, speech change, weakness and headaches.   Psychiatric/Behavioral: Negative for depression. The patient is not nervous/anxious and does not have insomnia.       Physical Exam  Laboratory/Imaging   Hemodynamics  Temp (24hrs), Av.8 °C (98.2 °F), Min:36.5 °C (97.7 °F), Max:37.2 °C (99 °F)   Temperature: 37.2 °C (99 °F)  Pulse  Av.1  Min: 75  Max: 108    Blood Pressure: 134/76      Respiratory      Respiration: 18, Pulse Oximetry: 94 %        RUL Breath Sounds: Clear, RML Breath Sounds: Diminished, RLL Breath Sounds: Diminished, JACQUELYN Breath Sounds: Clear, LLL Breath Sounds: Diminished    Fluids    Intake/Output Summary (Last 24 hours) at 05/10/18 1545  Last data filed at 18 1900   Gross per 24 hour   Intake              800 ml   Output                0 ml   Net              800 ml       Nutrition  Orders Placed This Encounter   Procedures   • Diet Order     Standing Status:   Standing     Number of Occurrences:   1     Order Specific Question:   Diet:     Answer:   Hepatic [9]     Physical Exam   Constitutional: She is oriented to person, place, and time. She appears well-developed and well-nourished. No distress.   HENT:   Head: Normocephalic and atraumatic.   Eyes: Conjunctivae are normal. No scleral icterus.   Neck: Neck supple. No JVD present.   Cardiovascular: Normal rate, regular rhythm and normal heart  sounds.  Exam reveals no gallop and no friction rub.    No murmur heard.  Pulmonary/Chest: Effort normal and breath sounds normal. No respiratory distress. She has no rales. She exhibits no tenderness.   Abdominal: Soft. Bowel sounds are normal. She exhibits no distension. There is tenderness (mild RUQ pain). There is no guarding.   Musculoskeletal: She exhibits tenderness (right forearm induration consistent with phlebitis). She exhibits no edema.   Neurological: She is alert and oriented to person, place, and time. No cranial nerve deficit.   Mentation improved today     Skin: Skin is warm and dry. She is not diaphoretic. No erythema. No pallor.   Psychiatric: She has a normal mood and affect. Her behavior is normal.   Nursing note and vitals reviewed.      Recent Labs      05/07/18 2344 05/09/18 1123 05/09/18   2355   WBC  13.3*  17.8*  15.2*   RBC  3.56*  3.76*  3.38*   HEMOGLOBIN  11.9*  12.4  11.4*   HEMATOCRIT  36.8*  38.7  34.3*   MCV  103.4*  102.9*  101.5*   MCH  33.4*  33.0  33.7*   MCHC  32.3*  32.0*  33.2*   RDW  49.3  50.1*  48.7   PLATELETCT  204  211  194   MPV  10.0  9.9  10.2     Recent Labs      05/07/18 2344 05/09/18   1123  05/09/18   2355   SODIUM  138  136  132*   POTASSIUM  4.4  3.8  4.1   CHLORIDE  100  99  100   CO2  30  27  28   GLUCOSE  120*  133*  129*   BUN  9  12  10   CREATININE  0.61  0.64  0.50   CALCIUM  9.3  9.5  9.5                      Assessment/Plan     * Intractable abdominal pain- (present on admission)   Assessment & Plan    Due to liver mets; pain better controlled although not optimal yet;  Was on MS Contin 45mg bid  along with Dilaudid po/IV PRN.   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   Less likely ovarian CA per Dr. Fernandes given the abscence of usual symptoms (carcinomatosis/ileus that would usually be present if ovarian had gone to liver)  Current tissue undergoing further stains - if still not diagnostic then patient will have EX-Lap biospy with Dr Fernandes at a later date.  D/w Dr Aleman - suspicion for cholangiocarcinoma, will need PORT prior to chemotherapy initiation.  Will need PET scan scheduled prior to discharge. - discussed with scheduling - will be coordinated after 5/16 to allow for resolution of infection.        Left hip pain   Assessment & Plan    Examination consistent with greater trochanteric bursitis  Ice for pain and inflammation control          Fever   Assessment & Plan    CXR showing left lower lung atx vs pneumonia - empiric omnicef/azithromycin  Elevated WBC decreasing 15 <-- 17  UA - Trace LE - will be covered by omnicef  Right arm with thrombophlebitis from previous IV site - improving.  Urine culture pending            HTN (hypertension)- (present on admission)   Assessment & Plan    Stable; cont current rx          Quality-Core Measures   Reviewed items::  Labs reviewed, Medications reviewed and Radiology images reviewed  Duarte catheter::  No Duarte  DVT prophylaxis pharmacological::  Heparin  Assessed for rehabilitation services:  Patient was assess for and/or received rehabilitation services during this hospitalization

## 2020-09-18 NOTE — PROGRESS NOTES
Assumed pt care at 1900.  Received report from day RN.  Assessment completed.  Pt AOx4.  Pt comlaints of pain at this time in abd 8/10 , continues with pain management as ordered.  No other s/s of discomfort or distress. Pt ambulating self in room. Skin intact. Bed in lowest position, locked. Pt educated on safety and POC, states understanding.  Pt calls appropriately.  Treaded socks in place, call light within reach and staff numbers provided.  Pt needs met at this time.   46 year old male with left flank pain. vitals WNL. PE as above. 46 year old male with left flank pain. vitals WNL. PE as above.  labs are unremarkable. ua with +blood. ct with left mid uretal stone. pain improved. will dc. f/u with urology. return precautions discussed.

## 2021-01-15 NOTE — PROGRESS NOTES
Discharge Instructions    Discharged to home by car with relative. Discharged via wheelchair, hospital escort: Yes.  Special equipment needed: Not Applicable    Be sure to schedule a follow-up appointment with your primary care doctor or any specialists as instructed.     Discharge Plan:   Diet Plan: Discussed  Activity Level: Discussed  Smoking Cessation Offered: Patient Counseled  Confirmed Follow up Appointment: Patient to Call and Schedule Appointment  Confirmed Symptoms Management: Discussed  Medication Reconciliation Updated: Yes  Influenza Vaccine Indication: Patient Refuses    I understand that a diet low in cholesterol, fat, and sodium is recommended for good health. Unless I have been given specific instructions below for another diet, I accept this instruction as my diet prescription.   Other diet: Regular diet as tolerated    Special Instructions: None    · Is patient discharged on Warfarin / Coumadin?   No     Depression / Suicide Risk    As [facility] are [facility], it is important to learn how to keep safe from harming yourself.    Recognize the warning signs:  · Abrupt changes in personality, positive or negative- including increase in energy   · Giving away possessions  · Change in eating patterns- significant weight changes-  positive or negative  · Change in sleeping patterns- unable to sleep or sleeping all the time   · Unwillingness or inability to communicate  · Depression  · Unusual sadness, discouragement and loneliness  · Talk of wanting to die  · Neglect of personal appearance   · Rebelliousness- reckless behavior  · Withdrawal from people/activities they love  · Confusion- inability to concentrate     If you or a loved one observes any of these behaviors or has concerns about self-harm, here's what you can do:  · Talk about it- your feelings and reasons for harming yourself  · Remove any means that you might use to hurt yourself (examples: pills, rope, extension  What Type Of Note Output Would You Prefer (Optional)?: Standard Output How Severe Is Your Acne?: mild cords, firearm)  · Get professional help from the community (Mental Health, Substance Abuse, psychological counseling)  · Do not be alone:Call your Safe Contact- someone whom you trust who will be there for you.  · Call your local CRISIS HOTLINE 243-0234 or 967-116-8756  · Call your local Children's Mobile Crisis Response Team [place] (749) 152-9265 or www.Acompli  · Call the toll free National Suicide Prevention Hotlines   · National Suicide Prevention Lifeline 655-572-ONNN (9320)  · National Hope Line Network 800-SUICIDE (161-1985)   Is This A New Presentation, Or A Follow-Up?: Acne

## 2022-10-03 NOTE — PROGRESS NOTES
Assumed care of patient. Alert and oriented to person and situation. Disoriented to time and place. Pt VSS. Pain at level of comfort at this time. Port accessed running fluids TKO. Ambulated in hoyos SBA with FWW, pt lightheaded after dangling prior to standing. Free of falls and trauma. Bed in low, bed alarm activated, and walker out of sight of patient. WCTM.    Patient Name: Philomena Davis  : 1960    MRN: [id number]                              Today's Date: 10/3/2022       Admit Date: 2022    Visit Dx:     ICD-10-CM ICD-9-CM   1. Alcohol withdrawal syndrome without complication (HCC)  F10.230 291.81   2. Acute alcoholic gastritis without hemorrhage  K29.20 535.30   3. Lactic acidosis  E87.2 276.2   4. Volume depletion  E86.9 276.50   5. Alcohol withdrawal syndrome with perceptual disturbance (HCC)  F10.232 291.81   6. 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   • Digital mucous cyst of toe of left foot   • Alcohol withdrawal (HCC)   • Alcoholism /alcohol abuse   • Hematemesis   • Diabetes mellitus, type 2 (HCC)   • Essential hypertension   • Generalized anxiety disorder   • Hyperlipidemia   • Glaucoma   • Alcohol withdrawal syndrome with perceptual disturbance (HCC)   • Elevated LFTs     Past Medical History:   Diagnosis Date   • Anxiety and depression    • Closed displaced fracture of lateral malleolus of left fibula    • Diabetes mellitus (HCC)    • Ganglion cyst of left foot    • Glaucoma    • Hypertension    • Wears glasses      Past Surgical History:   Procedure Laterality Date   • ANKLE OPEN REDUCTION INTERNAL FIXATION Left 3/24/2017    Procedure: LEFT ANKLE OPEN REDUCTION INTERNAL FIXATION WITH ILIAC CREST BONE GRAFT , EXCISION CYST / INNERSPACE LEFT FOOT;  Surgeon: Frank Larson MD;  Location:  [facility];  Service:    • AUGMENTATION MAMMAPLASTY Bilateral    • BREAST AUGMENTATION     • BREAST EXCISIONAL BIOPSY Right    • DILATION AND CURETTAGE, DIAGNOSTIC / THERAPEUTIC     • MN FIX NON/MALUNION FEMUR,W GRAFT Left 3/24/2017    Procedure: ILIAC CREST BONE GRAFT;  Surgeon: Frank Larson MD;  Location:  [facility] OR;  Service: Orthopedics   • WRIST SURGERY        General Information     Row Name 10/03/22 0936          OT  Time and Intention    Document Type therapy note (daily note)  -     Mode of Treatment occupational therapy  -     Row Name 10/03/22 0936          General Information    Patient Profile Reviewed yes  -     Existing Precautions/Restrictions fall;seizures  -     Row Name 10/03/22 0936          Cognition    Orientation Status (Cognition) oriented x 3  -     Row Name 10/03/22 0936          Safety Issues, Functional Mobility    Safety Issues Affecting Function (Mobility) insight into deficits/self-awareness;safety precaution awareness  -     Impairments Affecting Function (Mobility) balance;endurance/activity tolerance;pain;strength  -           User Key  (r) = Recorded By, (t) = Taken By, (c) = Cosigned By    Initials Name Provider Type     Arelis Amezcua OT Occupational Therapist                 Mobility/ADL's     Row Name 10/03/22 1020          Bed Mobility    Bed Mobility supine-sit;sit-supine  -     Supine-Sit [place] (Bed Mobility) modified independence  -     Sit-Supine [place] (Bed Mobility) minimum assist (75% patient effort)  assist with legs  -     Assistive Device (Bed Mobility) head of bed elevated;bed rails  -     Row Name 10/03/22 1020          Transfers    Transfers sit-stand transfer  -     Stand-Sit [place] (Transfers) standby assist  -     Row Name 10/03/22 1020          Stand-Sit Transfer    Assistive Device (Stand-Sit Transfers) walker, front-wheeled  -     Row Name 10/03/22 1020          Functional Mobility    Functional Mobility- Ind. Level contact guard assist  -     Functional Mobility- Device walker, front-wheeled  -     Functional Mobility-Distance (Feet) 30  -     Functional Mobility- Safety Issues step length decreased  -     Row Name 10/03/22 1020          Activities of Daily Living    BADL Assessment/Intervention lower body dressing;grooming  -     Row Name 10/03/22 1020          Lower Body Dressing Assessment/Training    [place]  Level (Lower Body Dressing) don;socks;minimum assist (75% patient effort)  -     Position (Lower Body Dressing) edge of bed sitting  -     Row Name 10/03/22 1020          Grooming Assessment/Training    [place] Level (Grooming) set up;oral care regimen;wash face, hands  -     Position (Grooming) supported standing  -           User Key  (r) = Recorded By, (t) = Taken By, (c) = Cosigned By    Initials Name Provider Type    Arelis Wen, OT Occupational Therapist               Obj/Interventions     Row Name 10/03/22 1023          Shoulder (Therapeutic Exercise)    Shoulder (Therapeutic Exercise) AROM (active range of motion)  -     Shoulder AROM (Therapeutic Exercise) bilateral;flexion;extension;aBduction;aDduction;10 repetitions  -     Row Name 10/03/22 1023          Elbow/Forearm (Therapeutic Exercise)    Elbow/Forearm (Therapeutic Exercise) AROM (active range of motion)  -     Elbow/Forearm AROM (Therapeutic Exercise) bilateral;flexion;extension;10 repetitions  -     Row Name 10/03/22 1023          Motor Skills    Therapeutic Exercise shoulder;elbow/forearm  -           User Key  (r) = Recorded By, (t) = Taken By, (c) = Cosigned By    Initials Name Provider Type    Arelis Wen, OT Occupational Therapist               Goals/Plan    No documentation.                Clinical Impression     Row Name 10/03/22 1023          Pain Assessment    Pretreatment Pain Rating 4/10  -     Posttreatment Pain Rating 4/10  -     Pain Location - Side/Orientation Bilateral  -     Pain Location - hand  -     Pain Intervention(s) Repositioned;Ambulation/increased activity  -     Row Name 10/03/22 1023          Plan of Care Review    Plan of Care Reviewed With patient  -     Outcome Evaluation Pt mod ind supine to sit,  SBA STS,  CGA to ambulate in room with RW, setup to brush teeth standing sinkside,  min A LBD,  min A sit to supine.  Pt completed 10 reps BUE TE reporting fatigue and declined   strengthening d/t hand pain.  Pt progressing but limtied by weakness and decreased activity tolerance.  -AC     Row Name 10/03/22 1023          Vital Signs    Pre Systolic BP Rehab 132  -AC     Pre Treatment Diastolic BP 77  -AC     Pretreatment Heart Rate (beats/min) 103  -AC     Posttreatment Heart Rate (beats/min) 102  -AC     Pre SpO2 (%) 92  -AC     O2 Delivery Pre Treatment supplemental O2  -AC     O2 Delivery Intra Treatment supplemental O2  -AC     Post SpO2 (%) 92  -AC     O2 Delivery Post Treatment supplemental O2  -AC     Pre Patient Position Supine  -AC     Post Patient Position Supine  -AC     Row Name 10/03/22 1023          Positioning and Restraints    Pre-Treatment Position in bed  -AC     Post Treatment Position bed  -AC     In Bed notified nsg;supine;call light within reach;encouraged to call for assist;exit alarm on  -AC           User Key  (r) = Recorded By, (t) = Taken By, (c) = Cosigned By    Initials Name Provider Type    Arelis Wen, OT Occupational Therapist               Outcome Measures     Row Name 10/03/22 1028          How much help from another is currently needed...    Putting on and taking off regular lower body clothing? 3  -AC     Bathing (including washing, rinsing, and drying) 3  -AC     Toileting (which includes using toilet bed pan or urinal) 3  -AC     Putting on and taking off regular upper body clothing 3  -AC     Taking care of personal grooming (such as brushing teeth) 3  -AC     Eating meals 3  -AC     AM-PAC 6 Clicks Score (OT) 18  -AC     Row Name 10/03/22 1028          Functional Assessment    Outcome Measure Options AM-PAC 6 Clicks Daily Activity (OT)  -AC           User Key  (r) = Recorded By, (t) = Taken By, (c) = Cosigned By    Initials Name Provider Type    Arelis Wen, SAGE Occupational Therapist                Occupational Therapy Education                 Title: PT OT SLP Therapies (In Progress)     Topic: Occupational Therapy (In Progress)      Point: ADL training (In Progress)     Description:   Instruct learner(s) on proper safety adaptation and remediation techniques during self care or transfers.   Instruct in proper use of assistive devices.              Learning Progress Summary           Patient Acceptance, E, NR by AC at 10/3/2022 1029    Acceptance, TB, NR by PS at 9/30/2022 1505    Acceptance, E, VU by DEANGELO at 9/29/2022 1201    Comment: Energy conservation/pacing; PLB; incentive spirometer use    Acceptance, E, VU by AC at 9/28/2022 1537    Acceptance, E, VU by AC at 9/27/2022 0845                   Point: Home exercise program (In Progress)     Description:   Instruct learner(s) on appropriate technique for monitoring, assisting and/or progressing therapeutic exercises/activities.              Learning Progress Summary           Patient Acceptance, TB, NR by PS at 9/30/2022 1505                   Point: Precautions (In Progress)     Description:   Instruct learner(s) on prescribed precautions during self-care and functional transfers.              Learning Progress Summary           Patient Acceptance, TB, NR by PS at 9/30/2022 1505    Acceptance, E, VU by DEANGELO at 9/29/2022 1201    Comment: Energy conservation/pacing; PLB; incentive spirometer use                   Point: Body mechanics (In Progress)     Description:   Instruct learner(s) on proper positioning and spine alignment during self-care, functional mobility activities and/or exercises.              Learning Progress Summary           Patient Acceptance, TB, NR by PS at 9/30/2022 1505    Acceptance, E, VU by DEANGELO at 9/29/2022 1201    Comment: Energy conservation/pacing; PLB; incentive spirometer use                               User Key     Initials Effective Dates Name Provider Type Discipline     06/16/21 -  Arelis Amezcua OT Occupational Therapist OT    PS 06/16/21 -  Elizabeth Maurice, RN Registered Nurse Nurse    DEANGELO 06/16/21 -  Vanessa Panchal OT Occupational Therapist OT               OT Recommendation and Plan  Planned Therapy Interventions (OT): activity tolerance training, BADL retraining, functional balance retraining, occupation/activity based interventions, patient/caregiver education/training, strengthening exercise, transfer/mobility retraining  Therapy Frequency (OT): daily  Plan of Care Review  Plan of Care Reviewed With: patient  Outcome Evaluation: Pt mod ind supine to sit,  SBA STS,  CGA to ambulate in room with RW, setup to brush teeth standing sinkside,  min A LBD,  min A sit to supine.  Pt completed 10 reps BUE TE reporting fatigue and declined  strengthening d/t hand pain.  Pt progressing but limtied by weakness and decreased activity tolerance.     Time Calculation:    Time Calculation- OT     Row Name 10/03/22 0936             Time Calculation- OT    OT Start Time 0936  -AC      OT Received On 10/03/22  -      OT Goal Re-Cert Due Date 10/07/22  -              Timed Charges    18546 - OT Therapeutic Exercise Minutes 10  -AC      64367 - OT Therapeutic Activity Minutes 15  -AC      46490 - OT Self Care/Mgmt Minutes 14  -AC              Total Minutes    Timed Charges Total Minutes 39  -AC       Total Minutes 39  -AC            User Key  (r) = Recorded By, (t) = Taken By, (c) = Cosigned By    Initials Name Provider Type    AC Arelis Amezcua OT Occupational Therapist              Therapy Charges for Today     Code Description Service Date Service Provider Modifiers Qty    61612694300  OT THER PROC EA 15 MIN 10/3/2022 Arelis Amezcua OT GO 1    [id number]  OT THERAPEUTIC ACT EA 15 MIN 10/3/2022 Arelis Amezcua OT GO 1    [id number]  OT SELF CARE/MGMT/TRAIN EA 15 MIN 10/3/2022 Arelis Amezcua OT GO 1               Arelis Amezcua OT  10/3/2022

## 2023-07-13 NOTE — PROGRESS NOTES
Gastroenterology Progress Note     Author: Fredi Guillen   Date & Time Created: 4/15/2018 11:30 AM    Chief Complaint:  RUQ pain, poorly differentiated adenocarcinoma of liver    Interval History:  4/15/2018: No issues overnight.  Still with pain and nausea but tolerated prep.    Review of Systems:  Review of Systems   Constitutional: Negative for chills and fever.   Respiratory: Negative for shortness of breath.    Cardiovascular: Negative for chest pain.   Gastrointestinal: Positive for abdominal pain, diarrhea and nausea. Negative for blood in stool, melena and vomiting.       Physical Exam:  Physical Exam   Constitutional: She is oriented to person, place, and time.   Cardiovascular: Normal rate and regular rhythm.    Pulmonary/Chest: Effort normal and breath sounds normal.   Abdominal: Soft. Bowel sounds are normal. She exhibits mass. She exhibits no distension. There is tenderness.   Musculoskeletal: She exhibits no edema.   Neurological: She is alert and oriented to person, place, and time.   Nursing note and vitals reviewed.      Labs:        Invalid input(s): [id number]      Recent Labs      18   005   SODIUM  136   POTASSIUM  3.7   CHLORIDE  104   CO2  25   BUN  15   CREATININE  0.70   CALCIUM  8.9     Recent Labs      18   0056   ALTSGPT  24   ASTSGOT  55*   ALKPHOSPHAT  153*   TBILIRUBIN  0.4   GLUCOSE  103*     Recent Labs      18   RBC  3.77*   HEMOGLOBIN  12.8   HEMATOCRIT  38.4   PLATELETCT  224     Recent Labs      18   WBC  9.4   ASTSGOT  55*   ALTSGPT  24   ALKPHOSPHAT  153*   TBILIRUBIN  0.4     Hemodynamics:  Temp (24hrs), Av.7 °C (98 °F), Min:36.1 °C (97 °F), Max:37 °C (98.6 °F)  Temperature: 37 °C (98.6 °F)  Pulse  Av.7  Min: 70  Max: 94   Blood Pressure: 117/66     Respiratory:    Respiration: 18, Pulse Oximetry: 95 %        RUL Breath Sounds: Diminished, RML Breath Sounds: Diminished, RLL Breath Sounds: Diminished, JACQUELYN Breath  Spoke with patient advised that  doesn't order labs prior, but he will be having Non-fasting labs according to his last office visit note. Sounds: Diminished, LLL Breath Sounds: Diminished  Fluids:    Intake/Output Summary (Last 24 hours) at 04/15/18 1130  Last data filed at 04/15/18 0818   Gross per 24 hour   Intake             1730 ml   Output                0 ml   Net             1730 ml     Weight: 63.7 kg (140 lb 6.9 oz)  GI/Nutrition:  Orders Placed This Encounter   Procedures   • DIET NPO     Standing Status:   Standing     Number of Occurrences:   8     Order Specific Question:   Type:     Answer:   At Midnight [5]     Order Specific Question:   Restrict to:     Answer:   Sips with Medications [3]     Medical Decision Making, by Problem:  Active Hospital Problems    Diagnosis   • *Intractable nausea and vomiting [R11.2]   • Tobacco abuse [Z72.0]   • HTN (hypertension) [I10]   • Adenocarcinoma (CMS-HCC) [C80.1]   • Abdominal pain [R10.9]     PROBLEMS:  1.  Poorly differentiated adenocarcinoma on liver mass biopsy, suspect GI origin based on staining from liver mass biopsies.  No obvious primary lesions on upper GI tract or colon.  2.  Right upper quadrant pain, likely secondary to liver mass.  3.  Nausea and vomiting.  4.  Weight loss.  5.  Mildly elevated liver tests likely secondary to liver mass.  6.  Hypertension.  7.  Hyperlipidemia.  8.  History of uterine cancer.  9.  Anxiety and depression.  10.  Chronic pain.    Plan:  1.  MRI with and without contrast abdomen  2. Oncology consultation  3. Advance diet.     Quality-Core Measures   Reviewed items::  Labs reviewed, Medications reviewed and Radiology images reviewed

## 2023-12-26 NOTE — CARE PLAN
Medication:   desvenlafaxine (Pristiq) 50 MG 24 hr tablet 90 tablet 0 11/1/2022 --    Sig - Route: Take 1 tablet by mouth daily. - Oral      Last office visit date: 11/3/2022, next 2/7/2024  Next appointment scheduled?: Yes 2/7/2024  Number of refills given: 0       Problem: Pain Management  Goal: Pain level will decrease to patient's comfort goal  Outcome: PROGRESSING AS EXPECTED    Intervention: Follow pain managment plan developed in collaboration with patient and Interdisciplinary Team  Prn oxycodone in use after education provided to patient.  Intervention: Educate and implement non-pharmacologic comfort measures. Examples: relaxation, distration, play therapy, activity therapy, massage, etc.  Heat packs in use for comfort.      Problem: Urinary Elimination:  Goal: Ability to reestablish a normal urinary elimination pattern will improve  Outcome: PROGRESSING AS EXPECTED    Intervention: Encourage scheduled voiding  Ambulating patient to bathroom Q1 hr due to frequency and urgency s/p IV lasix.

## (undated) DEVICE — MANIFOLD NEPTUNE 1 PORT (20/PK)

## (undated) DEVICE — KIT PROCEDURE DOUBLE ENDO ONLY (5/CA)

## (undated) DEVICE — CATHETER IV 20 GA X 1-1/4 ---SURG.& SDS ONLY--- (50EA/BX)

## (undated) DEVICE — CANISTER SUCTION RIGID RED 1500CC (40EA/CA)

## (undated) DEVICE — CONTAINER, SPECIMEN, STERILE

## (undated) DEVICE — BITE BLOCK ADULT 60FR (100EA/CA)

## (undated) DEVICE — BITEBLOCK ENDOSCOPIC PEDI. - (25/BX)

## (undated) DEVICE — SPONGE GAUZE NON-STERILE 4X4 - (2000/CA 10PK/CA)

## (undated) DEVICE — NEPTUNE 4 PORT MANIFOLD - (20/PK)

## (undated) DEVICE — TUBE NG SALEM SUMP 14FR (50EA/BX)

## (undated) DEVICE — FILM CASSETTE ENDO

## (undated) DEVICE — GOWN SURGEONS X-LARGE - DISP. (30/CA)

## (undated) DEVICE — KIT CUSTOM PROCEDURE SINGLE FOR ENDO  (15/CA)

## (undated) DEVICE — TUBE CONNECTING SUCTION - CLEAR PLASTIC STERILE 72 IN (50EA/CA)

## (undated) DEVICE — BASIN EMESIS DISP. - (250/CA)